# Patient Record
Sex: FEMALE | Race: WHITE | NOT HISPANIC OR LATINO | Employment: OTHER | URBAN - METROPOLITAN AREA
[De-identification: names, ages, dates, MRNs, and addresses within clinical notes are randomized per-mention and may not be internally consistent; named-entity substitution may affect disease eponyms.]

---

## 2019-10-18 ENCOUNTER — OFFICE VISIT - HEALTHEAST (OUTPATIENT)
Dept: CARDIOLOGY | Facility: CLINIC | Age: 79
End: 2019-10-18

## 2019-10-18 DIAGNOSIS — I10 BENIGN ESSENTIAL HYPERTENSION: ICD-10-CM

## 2019-10-18 RX ORDER — FERROUS SULFATE 325(65) MG
325 TABLET ORAL WEEKLY
Status: SHIPPED | COMMUNITY
Start: 2019-10-18 | End: 2021-08-17

## 2019-10-18 RX ORDER — ARIPIPRAZOLE 2 MG/1
2 TABLET ORAL DAILY
Refills: 1 | Status: SHIPPED | COMMUNITY
Start: 2019-09-15 | End: 2021-08-17

## 2019-10-18 RX ORDER — ROSUVASTATIN CALCIUM 5 MG/1
5 TABLET, COATED ORAL AT BEDTIME
Status: SHIPPED | COMMUNITY
Start: 2019-01-11

## 2019-10-18 RX ORDER — UBIDECARENONE 100 MG
100 CAPSULE ORAL DAILY
Status: SHIPPED | COMMUNITY
Start: 2019-10-18 | End: 2022-12-19

## 2019-10-18 RX ORDER — BUSPIRONE HYDROCHLORIDE 15 MG/1
15 TABLET ORAL 2 TIMES DAILY
Status: SHIPPED | COMMUNITY
Start: 2019-07-12 | End: 2022-12-19

## 2019-10-18 RX ORDER — LEVOTHYROXINE SODIUM 88 UG/1
88 TABLET ORAL DAILY
Status: SHIPPED | COMMUNITY
Start: 2019-07-14 | End: 2021-08-17

## 2019-10-18 RX ORDER — ACYCLOVIR 400 MG/1
400 TABLET ORAL DAILY PRN
Status: SHIPPED | COMMUNITY
Start: 2018-06-14 | End: 2021-09-30

## 2019-10-18 RX ORDER — HYDRALAZINE HYDROCHLORIDE 100 MG/1
50 TABLET, FILM COATED ORAL 3 TIMES DAILY
Status: SHIPPED | COMMUNITY
Start: 2019-10-18 | End: 2021-08-06 | Stop reason: DRUGHIGH

## 2019-10-18 RX ORDER — LOSARTAN POTASSIUM 100 MG/1
100 TABLET ORAL DAILY
Status: SHIPPED | COMMUNITY
Start: 2019-10-18 | End: 2021-08-17 | Stop reason: DRUGHIGH

## 2019-10-18 RX ORDER — BUPROPION HYDROCHLORIDE 100 MG/1
100 TABLET, EXTENDED RELEASE ORAL 2 TIMES DAILY
Status: SHIPPED | COMMUNITY
Start: 2019-08-24 | End: 2021-09-30

## 2019-10-18 ASSESSMENT — MIFFLIN-ST. JEOR: SCORE: 1194.36

## 2019-10-19 LAB
ATRIAL RATE - MUSE: 59 BPM
DIASTOLIC BLOOD PRESSURE - MUSE: NORMAL
INTERPRETATION ECG - MUSE: NORMAL
P AXIS - MUSE: 76 DEGREES
PR INTERVAL - MUSE: 184 MS
QRS DURATION - MUSE: 80 MS
QT - MUSE: 430 MS
QTC - MUSE: 425 MS
R AXIS - MUSE: -2 DEGREES
SYSTOLIC BLOOD PRESSURE - MUSE: NORMAL
T AXIS - MUSE: 38 DEGREES
VENTRICULAR RATE- MUSE: 59 BPM

## 2019-10-30 ENCOUNTER — COMMUNICATION - HEALTHEAST (OUTPATIENT)
Dept: CARDIOLOGY | Facility: CLINIC | Age: 79
End: 2019-10-30

## 2020-04-07 ENCOUNTER — AMBULATORY - HEALTHEAST (OUTPATIENT)
Dept: CARDIOLOGY | Facility: CLINIC | Age: 80
End: 2020-04-07

## 2020-04-08 ENCOUNTER — OFFICE VISIT - HEALTHEAST (OUTPATIENT)
Dept: CARDIOLOGY | Facility: CLINIC | Age: 80
End: 2020-04-08

## 2020-04-08 DIAGNOSIS — E78.5 DYSLIPIDEMIA, GOAL LDL BELOW 100: ICD-10-CM

## 2020-04-08 DIAGNOSIS — I10 BENIGN ESSENTIAL HYPERTENSION: ICD-10-CM

## 2020-04-08 DIAGNOSIS — I35.0 NONRHEUMATIC AORTIC VALVE STENOSIS: ICD-10-CM

## 2021-03-22 ENCOUNTER — COMMUNICATION - HEALTHEAST (OUTPATIENT)
Dept: CARDIOLOGY | Facility: CLINIC | Age: 81
End: 2021-03-22

## 2021-03-22 DIAGNOSIS — I10 BENIGN ESSENTIAL HYPERTENSION: ICD-10-CM

## 2021-03-26 ENCOUNTER — OFFICE VISIT - HEALTHEAST (OUTPATIENT)
Dept: CARDIOLOGY | Facility: CLINIC | Age: 81
End: 2021-03-26

## 2021-03-26 DIAGNOSIS — I35.0 NONRHEUMATIC AORTIC VALVE STENOSIS: ICD-10-CM

## 2021-03-26 DIAGNOSIS — I35.0 AORTIC STENOSIS: ICD-10-CM

## 2021-03-26 DIAGNOSIS — I10 BENIGN ESSENTIAL HYPERTENSION: ICD-10-CM

## 2021-03-26 RX ORDER — CLONIDINE HYDROCHLORIDE 0.1 MG/1
1 TABLET ORAL 2 TIMES DAILY
Status: SHIPPED | COMMUNITY
Start: 2021-02-15

## 2021-03-26 RX ORDER — DULOXETIN HYDROCHLORIDE 60 MG/1
120 CAPSULE, DELAYED RELEASE ORAL DAILY
Status: SHIPPED | COMMUNITY
Start: 2021-02-25 | End: 2022-12-19 | Stop reason: DRUGHIGH

## 2021-03-26 ASSESSMENT — MIFFLIN-ST. JEOR: SCORE: 1246.53

## 2021-03-29 LAB
ATRIAL RATE - MUSE: 87 BPM
DIASTOLIC BLOOD PRESSURE - MUSE: NORMAL
INTERPRETATION ECG - MUSE: NORMAL
P AXIS - MUSE: 75 DEGREES
PR INTERVAL - MUSE: 190 MS
QRS DURATION - MUSE: 76 MS
QT - MUSE: 382 MS
QTC - MUSE: 462 MS
R AXIS - MUSE: -7 DEGREES
SYSTOLIC BLOOD PRESSURE - MUSE: NORMAL
T AXIS - MUSE: 40 DEGREES
VENTRICULAR RATE- MUSE: 88 BPM

## 2021-04-01 ENCOUNTER — COMMUNICATION - HEALTHEAST (OUTPATIENT)
Dept: CARDIOLOGY | Facility: CLINIC | Age: 81
End: 2021-04-01

## 2021-04-01 DIAGNOSIS — I10 BENIGN ESSENTIAL HYPERTENSION: ICD-10-CM

## 2021-04-01 RX ORDER — HYDRALAZINE HYDROCHLORIDE 25 MG/1
50 TABLET, FILM COATED ORAL 3 TIMES DAILY
Qty: 540 TABLET | Refills: 2 | Status: SHIPPED | OUTPATIENT
Start: 2021-04-01 | End: 2021-08-06 | Stop reason: DRUGHIGH

## 2021-05-24 ENCOUNTER — RECORDS - HEALTHEAST (OUTPATIENT)
Dept: ADMINISTRATIVE | Facility: CLINIC | Age: 81
End: 2021-05-24

## 2021-05-25 ENCOUNTER — RECORDS - HEALTHEAST (OUTPATIENT)
Dept: ADMINISTRATIVE | Facility: CLINIC | Age: 81
End: 2021-05-25

## 2021-05-26 ENCOUNTER — RECORDS - HEALTHEAST (OUTPATIENT)
Dept: ADMINISTRATIVE | Facility: CLINIC | Age: 81
End: 2021-05-26

## 2021-05-28 ENCOUNTER — RECORDS - HEALTHEAST (OUTPATIENT)
Dept: ADMINISTRATIVE | Facility: CLINIC | Age: 81
End: 2021-05-28

## 2021-05-29 ENCOUNTER — RECORDS - HEALTHEAST (OUTPATIENT)
Dept: ADMINISTRATIVE | Facility: CLINIC | Age: 81
End: 2021-05-29

## 2021-05-30 ENCOUNTER — RECORDS - HEALTHEAST (OUTPATIENT)
Dept: ADMINISTRATIVE | Facility: CLINIC | Age: 81
End: 2021-05-30

## 2021-05-31 ENCOUNTER — RECORDS - HEALTHEAST (OUTPATIENT)
Dept: ADMINISTRATIVE | Facility: CLINIC | Age: 81
End: 2021-05-31

## 2021-06-02 NOTE — PATIENT INSTRUCTIONS - HE
Please increase the hydralazine to 75mg in the morning, 50mg in the afternoon and 50mg at bedtime.Please monitor your blood pressure and any symptoms of dizziness and call my nurse Kristy with blood pressure readings over the next few weeks,Her number is [phone]

## 2021-06-02 NOTE — TELEPHONE ENCOUNTER
Pt fell and broke her wrist, is scheduled for surgery 11/1/19.      Friend wants to know if pt should keep 11/8/19 appt with Dr Pena as pt missed her 10/28/19 stress echo appt.    Dr Pena - do you want stress echo prior to your visit with her?  We would have to reschedule your appt then until she is able to do the stress echo.  -sammi

## 2021-06-02 NOTE — TELEPHONE ENCOUNTER
----- Message from Alison Vaughan sent at 10/30/2019  2:20 PM CDT -----  Regarding: MDNYA  Caller: Beryl Castillo Rajesh, friend    Primary cardiologist: Dr. Pena    Detailed reason for call: Beryl Castillo states Jodee fractured her wrist 10/28/19 and she is having surgery at [facility] on 11/1/19. Please call back.     Best phone number: [phone]      Best time to contact: Any    Ok to leave a detailed message? Yes    Device? No

## 2021-06-03 VITALS
WEIGHT: 170 LBS | DIASTOLIC BLOOD PRESSURE: 66 MMHG | HEIGHT: 62 IN | SYSTOLIC BLOOD PRESSURE: 120 MMHG | RESPIRATION RATE: 16 BRPM | BODY MASS INDEX: 31.28 KG/M2 | HEART RATE: 64 BPM

## 2021-06-04 VITALS — WEIGHT: 168 LBS | BODY MASS INDEX: 30.73 KG/M2

## 2021-06-05 VITALS
HEART RATE: 60 BPM | HEIGHT: 63 IN | WEIGHT: 178 LBS | RESPIRATION RATE: 16 BRPM | BODY MASS INDEX: 31.54 KG/M2 | DIASTOLIC BLOOD PRESSURE: 76 MMHG | SYSTOLIC BLOOD PRESSURE: 126 MMHG

## 2021-06-16 PROBLEM — I35.0 AORTIC STENOSIS: Status: ACTIVE | Noted: 2021-03-26

## 2021-06-16 NOTE — PATIENT INSTRUCTIONS - HE
Please call my nurse Kristy with any heart related questions.Her number is [phone], We are going to plan a heart ultrasound to follow up on the aortic valve.We decided to visit in a year and repeat the heart ultrasound at that time.Please call with increased shortness of breath.

## 2021-06-28 NOTE — PROGRESS NOTES
"Progress Notes by Audie Pena MD at 4/8/2020  9:30 AM     Author: Audie Pena MD Service: -- Author Type: Physician    Filed: 4/8/2020  9:58 AM Encounter Date: 4/8/2020 Status: Signed    : Audie Pena MD (Physician)           The patient has been notified of following:     \"This telephone visit will be conducted via a call between you and your physician/provider. We have found that certain health care needs can be provided without the need for a physical exam.  This service lets us provide the care you need with a phone conversation.  If a prescription is necessary we can send it directly to your pharmacy.  If lab work is needed we can place an order for that and you can then stop by our lab to have the test done at a later time. If during the course of the call the physician/provider feels a telephone visit is not appropriate, you will not be charged for this service.\" Verbal consent has been obtained for this service by care team member:         HEART CARE PHONE ENCOUNTER        The patient has chosen to have the visit conducted as a telephone visit, to reduce risk of exposure given the current status of Coronavirus in our community. This telephone visit is being conducted via a call between the patient and physician/provider. Health care needs are being provided without a physical exam.     Assessment/Recommendations   Assessment:    1.  Hypertension.  She does tell me that at times her blood pressures are running mildly elevated in the 1 40-1 50 systolic range.  I did ask her to monitor her blood pressure over the next few weeks and report her blood pressure back to my nurse.  We talked about checking her blood pressure different times of the day and checking her blood pressure after she has been seated for a good 10 minutes.    2.  Very mild calcific aortic stenosis based on echocardiogram from [facility].  Periodic follow-up is planned.  Mean gradient was reported to be 10 " mmHg.    3.  Dyslipidemia.  Lipids are reviewed from November 2019 and outlined below.  She is on 5 mg of rosuvastatin.  Plan:  1.  She will update us with some blood pressure readings.  Follow-up in 6 months or sooner if specific issues were to arise.      Follow Up Plan: Follow up in 6 months  I have reviewed the note as documented.  This accurately captures the substance of my conversation with the patient.    Total time of call between patient and provider was 15 minutes minutes   Start Time: 9:34 AM  Stop Time: 9:49 AM       History of Present Illness/Subjective    Jodee Johnson is a 79 y.o. female who is being evaluated via a billable telephone visit.  I have reviewed notes from her primary care provider at [facility] including blood pressure results.  She provides me with some blood pressure results as well.  She tells me that overall she is been feeling well.  She has had no chest pain, dizziness, shortness of breath, palpitations.  We last visited October 2019 with reported significant elevation in blood pressure and she wanted my input and recommendations.  At that time we increased her hydralazine to 75 mg 3 times daily.  She reports that blood pressures have typically been at home in the 1 30-1 50 systolic range.  I have reviewed 2 blood pressures from her primary care provider that demonstrate ultimately 130/70 range.  The most recent blood pressure recording is from March 2021 her blood pressure was 136/66 by report.  She tells me she is following a low-salt diet.  We talked about resting for 10 to 15 minutes before checking her blood pressure and then providing me with some blood pressure readings over the next few weeks.  I reviewed her blood pressure doses of medications including all her medications.  Review of systems is reviewed and negative.  He tells me that she has now recovered fairly well from a wrist fracture.  We had planned an exercise stress echocardiogram when she saw me in October  but then she postponed this study because of the wrist fracture.  She also has been dealing with issues related to depression and also tells me that this is improved.    Lab results from 2019 at her primary care provider's office are reviewed.  Sodium 143, potassium 4.3, chloride 107, CO2 25, BUN 15, creatinine 0.78 ferritin 94.7, hemoglobin A1c 5.4 CBC within normal limits, cholesterol 151, triglycerides 103, HDL 60, LDL 70.    ECHO COMPLETE W CONTRAST2019  [facility]  Component Name Value Ref Range   EJECTION FRACTION 65-70%     Other Result Information   This result has an attachment that is not available.   Result Narrative        [facility]   [address], [address], [place] [address]   Main: [phone]  [email or web address]                                                 Transthoracic Echo Report   TOBY SY   Fabricewill ID: [id number] Age: 79 : 1940 Ordering Provider: DENYS RODRIGUEZ   Exam Date: 2019 10:21 Gender: F [place] [facility] [place]   Accession #: [id number] Height: 63 in BSA: 1.79 m  BP: 118 / 64   Weight: 166 lbs BMI: 29.4 kg/m  HR: 51         Site: [facility]   Location: Inpatient (Portable) Rhythm: Sinus Bradycardia   Procedure Components: 2D imaging with contrast, Color Doppler, Spectral Doppler   Indications: Chest pain chest pressure chest tightening   Technical Quality: Good Contrast: Definity   Previous Study: 2019   Final Conclusion   Normal LV size and systolic function with estimated ejection fraction of 65-70%.   No obvious regional LV wall motion abnormalities.   The right ventricle is normal in size and function.   Very mild calcific aortic stenosis (Vmax 2.2 m/s, mean gradient 10 mmHg, MARILU .1.5 cm2).   Estimated pulmonary artery pressure of 28 mmHg + RA pressure.   When compared to the previous echocardiographic report of 2019, there has been no   significant  change.     Result Narrative     Clinical History: Depression    Procedure: XR CHEST 2 VIEWS PA AND LATERAL    Comparison: Chest 5/8/2015    Findings: Cardiac and mediastinal silhouettes unremarkable. No pleural effusion, pneumothorax or focal consolidation. There is multilevel thoracic spondylosis.    Impression: Unremarkable chest examination.           I have reviewed and updated the patient's Past Medical History, Social History, Family History and Medication List.     Physical Examination not performed given phone encounter Review of Systems                                                Medical History  Surgical History Family History Social History   No past medical history on file. No past surgical history on file. No family history on file. Social History     Socioeconomic History   ? Marital status: Single     Spouse name: Not on file   ? Number of children: Not on file   ? Years of education: Not on file   ? Highest education level: Not on file   Occupational History   ? Not on file   Social Needs   ? Financial resource strain: Not on file   ? Food insecurity     Worry: Not on file     Inability: Not on file   ? Transportation needs     Medical: Not on file     Non-medical: Not on file   Tobacco Use   ? Smoking status: Former Smoker     Packs/day: 0.00   ? Smokeless tobacco: Never Used   Substance and Sexual Activity   ? Alcohol use: Not on file   ? Drug use: Not on file   ? Sexual activity: Not on file   Lifestyle   ? Physical activity     Days per week: Not on file     Minutes per session: Not on file   ? Stress: Not on file   Relationships   ? Social connections     Talks on phone: Not on file     Gets together: Not on file     Attends Bahai service: Not on file     Active member of club or organization: Not on file     Attends meetings of clubs or organizations: Not on file     Relationship status: Not on file   ? Intimate partner violence     Fear of current or ex partner: Not on file      Emotionally abused: Not on file     Physically abused: Not on file     Forced sexual activity: Not on file   Other Topics Concern   ? Not on file   Social History Narrative   ? Not on file          Medications  Allergies   Current Outpatient Medications   Medication Sig Dispense Refill   ? acyclovir (ZOVIRAX) 400 MG tablet Take 400 mg by mouth daily as needed.     ? ARIPiprazole (ABILIFY) 2 MG tablet Take 2 mg by mouth daily.  1   ? buPROPion (WELLBUTRIN SR) 100 MG 12 hr tablet Take 100 mg by mouth 2 (two) times a day.     ? busPIRone (BUSPAR) 15 MG tablet Take 7.5 mg by mouth 2 (two) times a day.     ? coenzyme Q10 100 mg capsule Take 100 mg by mouth daily.     ? conjugated estrogens (PREMARIN) vaginal cream Insert 1 Applicatorful into the vagina daily as needed.     ? escitalopram oxalate (LEXAPRO) 10 MG tablet Take 10 mg by mouth daily.     ? ferrous sulfate 325 (65 FE) MG tablet Take 325 mg by mouth once a week.     ? hydrALAZINE (APRESOLINE) 100 MG tablet Take 50 mg by mouth 3 (three) times a day.     ? hydrALAZINE (APRESOLINE) 25 MG tablet Take 1 tablet each morning together with the 50mg tablet for 75mg total or as directed 90 tablet 5   ? levothyroxine (SYNTHROID, LEVOTHROID) 88 MCG tablet Take 88 mcg by mouth daily.     ? losartan (COZAAR) 100 MG tablet Take 100 mg by mouth daily.     ? magnesium 250 mg Tab Take 500 mg by mouth daily.     ? melatonin 5 mg cap Take 10 mg by mouth at bedtime.     ? rOPINIRole (REQUIP) 0.5 MG tablet Take 1.5 mg by mouth at bedtime as needed.     ? rosuvastatin (CRESTOR) 5 MG tablet Take 5 mg by mouth every other day.     ? traMADol (ULTRAM) 50 mg tablet Take 50 mg by mouth daily as needed.       No current facility-administered medications for this visit.     Allergies   Allergen Reactions   ? Atenolol      Other reaction(s): Bradycardia  Other reaction(s): Bradycardia     ? Hydrochlorothiazide      Other reaction(s): Hypercalcemia  Other reaction(s): Hypercalcemia     ?  Gabapentin Other (See Comments)     Other reaction(s): Confusion  Other reaction(s): Insomnia, Unknown  insomnia  insomnia     ? Lisinopril Nausea Only     Other reaction(s): GI Upset  Other reaction(s): Abdominal Pain     ? Nortriptyline Unknown   ? Oxycodone Itching   ? Paroxetine      Other reaction(s): Drowsiness, Sedation  Other reaction(s): Sedation     ? Tetracycline Photosensitivity and Rash   ? Amlodipine      Other reaction(s): GI Upset, Vomiting  Other reaction(s): Abdominal Pain     ? Codeine Itching     Tolerates tylenol 3 at home per pt     ? Oxycodone-Acetaminophen Itching   ? Sertraline      Other reaction(s): Sedation  Other reaction(s): Sedation     ? Diphenhydramine Anxiety     Other reaction(s): Agitation  Other reaction(s): Unknown           Lab Results    Chemistry/lipid CBC Cardiac Enzymes/BNP/TSH/INR   No results found for: CHOL, HDL, LDLCALC, TRIG, CREATININE, BUN, K, NA, CL, CO2 No results found for: WBC, HGB, HCT, MCV, PLT No results found for: CKTOTAL, CKMB, CKMBINDEX, TROPONINI, BNP, TSH, INR   18-OCT-2019 15:06:39 HE JOES/JOHNS/TERRI/RAQUEL  Sinus bradycardia with Premature atrial complexes  Nonspecific ST and T wave abnormality  Abnormal ECG  When compared with ECG of 20-MAY-1999 15:40,  Premature atrial complexes are now Present  ST now depressed in Anterior leads  Nonspecific T wave abnormality now evident in Anterolateral leads  Confirmed by BINA PATE MD LOC: ([id number]) on 10/19/2019 5:12:28 PM  25mm/s 10mm/mV 150Hz 8.0 SP2 12SL 239 CATRACHITO: 2  Referred by: Jluis Reese / MD: BINA LOC: HERLINDA Pena

## 2021-06-28 NOTE — PROGRESS NOTES
Progress Notes by Audie Pena MD at 10/18/2019  2:10 PM     Author: Audie Pena MD Service: -- Author Type: Physician    Filed: 10/18/2019  3:25 PM Encounter Date: 10/18/2019 Status: Signed    : Audie Pena MD (Physician)             [facility] Progress Note    Assessment:  1.  Hypertension.  Long-standing hypertension.  She had admission to the hospital August 2019 for elevated blood pressure.  Her blood pressure some better but still suboptimal.  She reports that typically her blood pressures are in the 140 systolic range.  She is going to increase the hydralazine to 75 mg in the morning and continue with 50 mg in the afternoon and bedtime and monitor and report her blood pressures.  I am going to ask that she undergo exercise stress echocardiogram secondary to the elevated troponin that was identified a few months ago and likely related to the hypertension.  She had a negative nuclear stress test in 2014.    2.  Very mild calcific aortic stenosis based on echocardiogram from a few months ago.  Mean gradient of 10 mmHg.    3.  Risk factor modification.  Less ~179 LDL 89 HDL 67 a few months ago at her primary care physician's office.  TSH was noted to be mildly low July 2019 and she is encouraged to follow-up with her primary care physician in this regard.      Plan:  Increase hydralazine to 75/50/50.            Stress echocardiogram and monitor blood pressure.            Follow-up 1 month.    1. Benign essential hypertension  ECG Clinic - Today    Echo Stress Exercise    hydrALAZINE (APRESOLINE) 25 MG tablet         An After Visit Summary was printed and given to the patient.    Subjective:    Jodee Johnson is a 79 y.o. female who returned for a planned  follow up visit.  I have not seen her since 2014.  She has a history of hypertension and restless leg syndrome and had been having difficulties with dyspnea when we visited May 2014.  She had an equivocal stress  echocardiogram that was described as technically difficult at that time.  We subsequently to the stress nuclear study which was negative for ischemia.  She has not returned in the interim.  I reviewed recent chart notes from hospitalization August 2019 where she was admitted for marked elevation in blood pressure.  Her blood pressure was 235/77.  She reports that the number of years ago she had a history of hemorrhagic stroke related to suboptimal blood pressure control.  She has been taking her medications regularly.  Her hydralazine was subsequently adjusted in the hospital to 50 mg 3 times daily from 50 mg twice daily.  She reports that her blood pressures have been better and typically running in the 140/70 range but does not run much lower.    She has been walking regularly.  She does not describe significant chest discomfort or significant shortness of breath.  She does describe with insomnia.  She had a mild elevation in troponin during that hospitalization in August which apparently was felt to be related to the elevated blood pressure.  Had an echocardiogram during that hospitalization that demonstrated normal systolic function.  I have reviewed the laboratory studies, chart notes and echocardiogram results.    She does have a history of depression and has been in the hospital in September 2019.      Past Medical History:   . Date   ? Acne teen   ? Balance disorder 2006   ? Cataract 2010   ? Depression 1973   ? Dysthymia   ? Fe deficiency anemia   ? Fecal incontinence 5/18/2011   wears pad all time, soft formed stools ;citrucel not help.   ? Hair loss 2015   ? Hiatal hernia   ? HTN (hypertension)   ? Hyperparathyroidism 12-06   sestamibi neg/nl Ca but PTH 94 5/10   ? Hypothyroid   resolved never really high TSH   ? Obesity   ? Oral herpes simplex infection   ? Osteopenia of the elderly   fracturs   ? Post-surgical hypothyroidism   ? Psoriasis 2007   ? Psoriasis   ? Recurrent UTI   ? RLS (restless legs  syndrome)   ? Sciatica of right side       Past Surgical History:   . Laterality Date   ? COLONOSCOPY SCREENING 9-   ? COMPLETION LEFT THYROID LOBECTOMY 10-5-12   [facility]   ? ESOPHAGOGASTRODUODENOSCOPY 9-   ? pain agree   ? PARTIAL THYROIDECTOMY years ago [facility]   ? thyroid nodule 40s   biopsy - benign   ? tib fib fract repair   Tib, fib, and ankle fx with nola and pin placement   ? TUBAL LIGATION     Allergies:   Allergies   Allergen Reactions   ? Tetracycline Rash   ? Amlodipine GI Upset   ? Atenolol Bradycardia   ? Codeine Itching   ? Diphenhydramine Agitation   ? Gabapentin *Unknown - Pt Doesn't Remember   insomnia   ? Hydrochlorothiazide Hypercalcemia   ? Lisinopril GI Upset       ? Paroxetine Sedation       ? Percocet [Oxycodone-Acetaminophen] Itching   ? Sertraline Sedation     Family History   Problem Relation Age of Onset   ? Heart Disease Mother   MI (Tob) at 70s/CAB in 80s   ? Hypertension Mother   ? Other Mother   DJD   ? Osteoporosis Mother   hip fracture   ? Arthritis Mother   rheumatoid   ? Hypertension Father   ? Heart Disease Father   VALVE DISEASE, CHF, RHEMATIC HEART   ? Other Father   Snoring   ? Stroke Paternal Grandmother   ? Stroke Paternal Aunt   ? Cancer-breast Maternal Aunt   dx 80's   ? Cancer-breast Sister   61yo   ? Diabetes Sister   ? Thyroid Disease Sister   hypo   ? Thyroid Disease Sister   hypo   ? Diabetes Paternal Uncle   ? Diabetes Maternal Uncle   ? Other Sister   colon polyps at 58   ? Other Daughter   RLS   ? Cancer-ovarian No Family History   ? Other Unknown   dementia        Review of Systems:   General: WNL  Eyes: WNL  Ears/Nose/Throat: Hearing Loss  Lungs: WNL  Heart: Irregular Heartbeat, Leg Swelling  Stomach: WNL  Bladder: WNL  Muscle/Joints: Joint Pain  Skin: WNL  Nervous System: WNL  Mental Health: Depression     Blood: WNL      Problem List:    There is no problem list on file for this patient.      Social History     Socioeconomic History   ?  Marital status: Single     Spouse name: Not on file   ? Number of children: Not on file   ? Years of education: Not on file   ? Highest education level: Not on file   Occupational History   ? Not on file   Social Needs   ? Financial resource strain: Not on file   ? Food insecurity:     Worry: Not on file     Inability: Not on file   ? Transportation needs:     Medical: Not on file     Non-medical: Not on file   Tobacco Use   ? Smoking status: Former Smoker     Packs/day: 0.00   ? Smokeless tobacco: Never Used   Substance and Sexual Activity   ? Alcohol use: Not on file   ? Drug use: Not on file   ? Sexual activity: Not on file   Lifestyle   ? Physical activity:     Days per week: Not on file     Minutes per session: Not on file   ? Stress: Not on file   Relationships   ? Social connections:     Talks on phone: Not on file     Gets together: Not on file     Attends [employer] service: Not on file     Active member of club or organization: Not on file     Attends meetings of clubs or organizations: Not on file     Relationship status: Not on file   ? Intimate partner violence:     Fear of current or ex partner: Not on file     Emotionally abused: Not on file     Physically abused: Not on file     Forced sexual activity: Not on file   Other Topics Concern   ? Not on file   Social History Narrative   ? Not on file       No family history on file.    Current Outpatient Medications   Medication Sig Dispense Refill   ? acyclovir (ZOVIRAX) 400 MG tablet Take 400 mg by mouth daily as needed.     ? ARIPiprazole (ABILIFY) 2 MG tablet Take 2 mg by mouth daily.  1   ? buPROPion (WELLBUTRIN SR) 100 MG 12 hr tablet Take 100 mg by mouth 2 (two) times a day.     ? busPIRone (BUSPAR) 15 MG tablet Take 7.5 mg by mouth 2 (two) times a day.     ? coenzyme Q10 100 mg capsule Take 100 mg by mouth daily.     ? conjugated estrogens (PREMARIN) vaginal cream Insert 1 Applicatorful into the vagina daily as needed.     ? escitalopram oxalate  "(LEXAPRO) 10 MG tablet Take 10 mg by mouth daily.     ? ferrous sulfate 325 (65 FE) MG tablet Take 325 mg by mouth once a week.     ? furosemide (LASIX) 20 MG tablet Take 20 mg by mouth daily.     ? hydrALAZINE (APRESOLINE) 100 MG tablet Take 50 mg by mouth 3 (three) times a day.     ? levothyroxine (SYNTHROID, LEVOTHROID) 88 MCG tablet Take 88 mcg by mouth daily.     ? losartan (COZAAR) 100 MG tablet Take 100 mg by mouth daily.     ? magnesium 250 mg Tab Take 500 mg by mouth daily.     ? melatonin 5 mg cap Take 10 mg by mouth at bedtime.     ? potassium chloride (K-DUR,KLOR-CON) 10 MEQ tablet Take 10 mEq by mouth daily.     ? rOPINIRole (REQUIP) 0.5 MG tablet Take 1.5 mg by mouth at bedtime as needed.     ? rosuvastatin (CRESTOR) 5 MG tablet Take 5 mg by mouth every other day.     ? traMADol (ULTRAM) 50 mg tablet Take 50 mg by mouth daily as needed.     ? hydrALAZINE (APRESOLINE) 25 MG tablet Take 1 tablet each morning together with the 50mg tablet for 75mg total or as directed 90 tablet 5     No current facility-administered medications for this visit.        Objective:     /66 (Patient Site: Right Arm, Patient Position: Sitting, Cuff Size: Adult Regular)   Pulse 64   Resp 16   Ht 5' 2\" (1.575 m)   Wt 170 lb (77.1 kg)   BMI 31.09 kg/m    170 lb (77.1 kg)   [unfilled]  Wt Readings from Last 3 Encounters:   10/18/19 170 lb (77.1 kg)   130/68 right arm sitting.    Physical Exam:    GENERAL APPEARANCE: alert, no apparent distress  HEENT: no scleral icterus or xanthelasma  NECK: jugular venous pressure normal limits  CHEST: symmetric, the lungs are clear to auscultation  CARDIOVASCULAR: regular rhythm with systolic murmur, S4; no carotid bruits  Abdomen: No Organomegaly, masses, bruits, or tenderness. Bowels sounds are present      EXTREMITIES: no cyanosis, clubbing or edema    Cardiac Testing:  Site: [facility]   Location: Inpatient (Portable) Rhythm: Sinus Bradycardia   Procedure Components: 2D " imaging with contrast, Color Doppler, Spectral Doppler   Indications: Chest pain chest pressure chest tightening   Technical Quality: Good Contrast: Definity   Previous Study: 1/11/2019   Final Conclusion   Normal LV size and systolic function with estimated ejection fraction of 65-70%.   No obvious regional LV wall motion abnormalities.   The right ventricle is normal in size and function.   Very mild calcific aortic stenosis (Vmax 2.2 m/s, mean gradient 10 mmHg, MARILU .1.5 cm2).   Estimated pulmonary artery pressure of 28 mmHg + RA pressure.   When compared to the previous echocardiographic report of 1/11/2019, there has been no   significant change.     Estimated EF: 65-70%   FINDINGS   Left Ventricle Normal left ventricular size. Hyperdynamic left ventricular systolic function   with an estimated ejection fraction of   65-70%.  No obvious regional wall motion abnormalities.  Mild left ventricular hypertrophy.   Diastolic Function Indeterminate diastolic filling pattern.   Right Ventricle The right ventricle is normal in size and function.   Left Atrium Left atrial size at the upper limits of normal.   Right Atrium Right atrial size at the upper limits of normal.   Aortic Valve Very mild calcific aortic stenosis (Vmax 2.2 m/s, mean gradient 10 mmHg, MARILU .1.5   cm2).  Trace aortic   regurgitation.   Mitral Valve Mild mitral annular calcification.  No mitral stenosis. Trace mitral   regurgitation.   Tricuspid Valve Structurally normal tricuspid valve without significant stenosis. Mild   tricuspid regurgitation. Estimated   pulmonary artery pressure of 28 mmHg + RA pressure.   Pulmonic Valve Limited view of the pulmonic valve without significant stenosis. Trace pulmonic   regurgitation.   Pericardium Normal pericardium without effusion.   Aorta The sinuses of Valsalva, sinotubular junction, and ascending aorta appear normal.   Inferior Vena Cava IVC not well visualized.   Other None.  ECG demonstrates sinus rhythm  with occasional premature atrial complex nonspecific ST-T changes  Lab Results:    No results found for: NA, K, CL, CO2, BUN, CREATININE, GLUCOSE, CALCIUM  No results found for: CHOL, TRIG, HDL, LDLDIRECT  No results found for: BNP  No results found for: CREATININE  No results found for: LDLCALC  No results found for: WBC, HGB, HCT, MCV, PLT      This note has been dictated using voice recognition software. Any grammatical or context distortions are unintentional and inherent to the software.

## 2021-06-30 NOTE — PROGRESS NOTES
Progress Notes by Audie Pena MD at 3/26/2021  2:10 PM     Author: Audie Pena MD Service: -- Author Type: Physician    Filed: 3/26/2021  3:10 PM Encounter Date: 3/26/2021 Status: Signed    : Audie Pena MD (Physician)             [facility] Progress Note    Assessment:  1.  Hypertension.  Blood pressure appears to be under good control.  Today's blood pressure 126/76.  I reviewed her current combination of medications which she reports that she is tolerating.  I asked her to continue to monitor her blood pressure and certainly call with any questions or concerns.    2.  Mild aortic stenosis.  This was reported very mild on echocardiography August 2019.  The report indicates normal systolic function, mean gradient across aortic valve of 10 mmHg.  She is not experiencing any symptoms related to the aortic valve and the exam is unchanged.  We discussed follow-up echocardiography and sided that we would rediscuss in 1 year unless she develops any particular symptoms.    3.  Dyslipidemia on low-dose rosuvastatin.  In February from her outside clinic cholesterol was 136, LDL 56.  White count 6.8 hemoglobin 13.6 hematocrit 41.4 platelets 374, mild elevation in calcium which is being monitored by her primary care provider, sodium 142, potassium 3.7, chloride 107.    4.  Irregular rhythm.  EKG today demonstrates sinus rhythm with premature atrial complexes and nonspecific ST-T changes.  Nonspecific ST-T changes are similar to October 2019.  We discussed potentially being at higher risk for element of atrial fibrillation and I asked her to monitor for irregular heart rhythm or palpitations and to update us.      Plan: As outlined above with plan follow-up in 1 year.    1. Aortic stenosis  ECG Clinic - Today    CANCELED: Echo Complete   2. Benign essential hypertension     3. Nonrheumatic aortic valve stenosis           An After Visit Summary was printed and given to the  patient.    Subjective:    Jodee Johnson is a 80 y.o. female who returned for a planned  follow up visit.  I have reviewed notes from her primary care physician including laboratory studies.  We visited by telephone April 2020.  She originally came to see me in 2019 because of some blood pressure issues and her blood pressure per her report is been under good control.  In addition she did have an echocardiogram through the outside clinic in 2019 that did reveal mild aortic stenosis.  He tells me that blood pressures typically have been between 120 and 140/60-80 diastolic.  She denies chest pain, shortness of breath, dizziness or lightheadedness.  She does deal with issues related to depression and is followed closely per review of chart record.    Review of Systems:   General: WNL  Eyes: WNL  Ears/Nose/Throat: Hearing Loss  Lungs: WNL  Heart: WNL  Stomach: WNL  Bladder: WNL  Muscle/Joints: WNL  Skin: WNL  Nervous System: WNL  Mental Health: Depression     Blood: WNL      Problem List:    Patient Active Problem List   Diagnosis   ? Benign essential hypertension   ? Aortic stenosis       Social History     Socioeconomic History   ? Marital status: Single     Spouse name: Not on file   ? Number of children: Not on file   ? Years of education: Not on file   ? Highest education level: Not on file   Occupational History   ? Not on file   Social Needs   ? Financial resource strain: Not on file   ? Food insecurity     Worry: Not on file     Inability: Not on file   ? Transportation needs     Medical: Not on file     Non-medical: Not on file   Tobacco Use   ? Smoking status: Former Smoker     Packs/day: 0.00   ? Smokeless tobacco: Never Used   Substance and Sexual Activity   ? Alcohol use: Not on file   ? Drug use: Not on file   ? Sexual activity: Not on file   Lifestyle   ? Physical activity     Days per week: Not on file     Minutes per session: Not on file   ? Stress: Not on file   Relationships   ? Social connections      Talks on phone: Not on file     Gets together: Not on file     Attends [employer] service: Not on file     Active member of club or organization: Not on file     Attends meetings of clubs or organizations: Not on file     Relationship status: Not on file   ? Intimate partner violence     Fear of current or ex partner: Not on file     Emotionally abused: Not on file     Physically abused: Not on file     Forced sexual activity: Not on file   Other Topics Concern   ? Not on file   Social History Narrative   ? Not on file       No family history on file.    Current Outpatient Medications   Medication Sig Dispense Refill   ? acyclovir (ZOVIRAX) 400 MG tablet Take 400 mg by mouth daily as needed.     ? ARIPiprazole (ABILIFY) 2 MG tablet Take 2 mg by mouth daily.  1   ? buPROPion (WELLBUTRIN SR) 100 MG 12 hr tablet Take 100 mg by mouth 2 (two) times a day.     ? busPIRone (BUSPAR) 15 MG tablet Take 7.5 mg by mouth 2 (two) times a day.     ? cholecalciferol, vitamin D3, 50 mcg (2,000 unit) capsule Take 1 capsule by mouth daily.     ? cloNIDine HCL (CATAPRES) 0.1 MG tablet TAKE 1 TABLET BY MOUTH TWICE DAILY. GENERIC EQUIVALENT FOR CATAPRES     ? coenzyme Q10 100 mg capsule Take 100 mg by mouth daily.     ? cyanocobalamin 1000 MCG tablet Take 1,000 mcg by mouth daily.     ? DULoxetine (CYMBALTA) 60 MG capsule Take 120 mg by mouth daily.     ? ferrous sulfate 325 (65 FE) MG tablet Take 325 mg by mouth once a week.     ? hydrALAZINE (APRESOLINE) 100 MG tablet Take 50 mg by mouth 3 (three) times a day.     ? hydrALAZINE (APRESOLINE) 25 MG tablet Take 2 tablets (50 mg total) by mouth 3 (three) times a day. 270 tablet 0   ? levothyroxine (SYNTHROID, LEVOTHROID) 88 MCG tablet Take 88 mcg by mouth daily.     ? losartan (COZAAR) 100 MG tablet Take 100 mg by mouth daily.     ? rosuvastatin (CRESTOR) 5 MG tablet Take 5 mg by mouth every other day.       No current facility-administered medications for this visit.        Objective:  "    /76 (Patient Site: Left Arm, Patient Position: Sitting, Cuff Size: Adult Regular)   Pulse 60   Resp 16   Ht 5' 3\" (1.6 m)   Wt 178 lb (80.7 kg)   BMI 31.53 kg/m    178 lb (80.7 kg)   [unfilled]  Wt Readings from Last 3 Encounters:   21 178 lb (80.7 kg)   20 168 lb (76.2 kg)   10/18/19 170 lb (77.1 kg)       Physical Exam:    GENERAL APPEARANCE: alert, no apparent distress  HEENT: no scleral icterus or xanthelasma  NECK: jugular venous pressure within normal limits  CHEST: symmetric, the lungs are clear to auscultation  CARDIOVASCULAR: regular rhythm with soft systolic murmur early peaking, click, or gallop; no carotid bruits  Abdomen: No Organomegaly, masses, bruits, or tenderness. Bowels sounds are present      EXTREMITIES: no cyanosis, clubbing or edema    Cardiac Testing:  Result Narrative   EXAM: XR CHEST 2 VIEWS PA AND LATERAL  LOCATION: [facility]  DATE/TIME: 2019 3:40 PM    INDICATION: Cough.  COMPARISON: 2019.    IMPRESSION: Heart size and pulmonary vascularity within normal limits. Left  basilar atelectasis. Hyperinflation both lungs with probable emphysematous  exchange. Hypertrophic changes thoracic spine. Anterior wedging of a lower  thoracic vertebral body stable. Aortic calcification.   Other Result Information   Interface, [employer] - 2019  5:33 PM CST  EXAM: XR CHEST 2 VIEWS PA AND LATERAL  LOCATION: [facility]  DATE/TIME: 2019 3:40 PM    INDICATION: Cough.  COMPARISON: 2019.    IMPRESSION: Heart size and pulmonary vascularity within normal limits. Left  basilar atelectasis. Hyperinflation both lungs with probable emphysematous  exchange. Hypertrophic changes thoracic spine. Anterior wedging of a lower  thoracic vertebral body stable. Aortic calcification     Excellian ID: [id number] Age: 79 : 1940 Ordering Provider: DENYS RODRIGUEZ   Exam Date: 2019 10:21 Gender: F Sonographer: [facility]   Accession #: [id number] Height: " 63 in BSA: 1.79 m  BP: 118 / 64   Weight: 166 lbs BMI: 29.4 kg/m  HR: 51         Site: [facility]   Location: Inpatient (Portable) Rhythm: Sinus Bradycardia   Procedure Components: 2D imaging with contrast, Color Doppler, Spectral Doppler   Indications: Chest pain chest pressure chest tightening   Technical Quality: Good Contrast: Definity   Previous Study: 1/11/2019   Final Conclusion   Normal LV size and systolic function with estimated ejection fraction of 65-70%.   No obvious regional LV wall motion abnormalities.   The right ventricle is normal in size and function.   Very mild calcific aortic stenosis (Vmax 2.2 m/s, mean gradient 10 mmHg, MARILU .1.5 cm2).   Estimated pulmonary artery pressure of 28 mmHg + RA pressure.   When compared to the previous echocardiographic report of 1/11/2019, there has been no   significant change.     Estimated EF: 65-70    Lab Results:  ECG today demonstrates sinus rhythm with premature atrial complexes, nonspecific ST-T changes.  Compared to October 2019 findings are similar although more frequent premature atrial complexes are noted.    This note has been dictated using voice recognition software. Any grammatical or context distortions are unintentional and inherent to the software.

## 2021-07-13 ENCOUNTER — RECORDS - HEALTHEAST (OUTPATIENT)
Dept: ADMINISTRATIVE | Facility: CLINIC | Age: 81
End: 2021-07-13

## 2021-07-21 ENCOUNTER — RECORDS - HEALTHEAST (OUTPATIENT)
Dept: ADMINISTRATIVE | Facility: CLINIC | Age: 81
End: 2021-07-21

## 2021-08-04 ENCOUNTER — TELEPHONE (OUTPATIENT)
Dept: CARDIOLOGY | Facility: CLINIC | Age: 81
End: 2021-08-04

## 2021-08-04 DIAGNOSIS — I10 BENIGN ESSENTIAL HYPERTENSION: Primary | ICD-10-CM

## 2021-08-04 DIAGNOSIS — I35.0 AORTIC STENOSIS: ICD-10-CM

## 2021-08-05 NOTE — TELEPHONE ENCOUNTER
How long has she been taking the 100mg tid. Is she tolerating and how has bp been ? She is overdue for follow up. If tolerating and bp ok would continue as is. Thanks.

## 2021-08-05 NOTE — TELEPHONE ENCOUNTER
Need clarification on hydralazine dose.    Spouse states pt is taking a 50 mg  PLUS two 25 mg tablets for 100 mg three times daily.    Prescriptions have been sent for both 50 mg tablets and 25 mg tablets - both instructions stated to take 50 mg three times daily.    4/8/20 OV:  We last visited October 2019 with reported significant elevation in blood pressure and she wanted my input and recommendations. At that time we increased her hydralazine to 75 mg 3 times daily. She reports that blood pressures have typically been at home in the 1 30-1 50 systolic range. I have reviewed 2 blood pressures from her primary care provider that demonstrate ultimately 130/70 range. The most recent blood pressure recording is from March 2021 her blood pressure was 136/66 by report.     10/18/19 OV:  increase the hydralazine to 75mg in the morning, 50mg in the afternoon and 50mg at bedtime.    Recent blood pressures are 147/80 and 160/80.    Dr Pena - what dose should she be on?  Spouse is trying to get meds set up in pill packs so this issue is eliminated.  -sammi

## 2021-08-05 NOTE — TELEPHONE ENCOUNTER
----- Message from Malaika Figueroa sent at 8/4/2021  2:44 PM CDT -----  Regarding: [facility]  General phone call:    Caller: [facility]   Primary cardiologist: [facility]   Detailed reason for call: Hydralazine  - she needs to verify the dosage.    Best phone number: [phone]  Best time to contact: any  Ok to leave a detailed message? yes    Additional Info:

## 2021-08-06 RX ORDER — HYDRALAZINE HYDROCHLORIDE 100 MG/1
100 TABLET, FILM COATED ORAL 3 TIMES DAILY
Qty: 270 TABLET | Refills: 0 | Status: SHIPPED | OUTPATIENT
Start: 2021-08-06 | End: 2021-08-17

## 2021-08-06 NOTE — TELEPHONE ENCOUNTER
Recommendations discussed with Vernon.  Vernon verbalized understanding and pt seems to be tolerating hydralazine 100 mg tid fine.  Vernon open to making an appt, and hung up while on hold.  Message sent to scheduling.      Discussed with Mary - they last filled 50 mg tablets tid on 6/29/21, and 25 mg tabs (take 2 tabs 50 mg tid) on 4/20/21.  -sammi

## 2021-08-17 ENCOUNTER — OFFICE VISIT (OUTPATIENT)
Dept: CARDIOLOGY | Facility: CLINIC | Age: 81
End: 2021-08-17
Payer: COMMERCIAL

## 2021-08-17 VITALS
DIASTOLIC BLOOD PRESSURE: 50 MMHG | SYSTOLIC BLOOD PRESSURE: 92 MMHG | HEART RATE: 96 BPM | BODY MASS INDEX: 27.29 KG/M2 | WEIGHT: 154 LBS | RESPIRATION RATE: 18 BRPM | HEIGHT: 63 IN

## 2021-08-17 DIAGNOSIS — I35.0 AORTIC VALVE STENOSIS, ETIOLOGY OF CARDIAC VALVE DISEASE UNSPECIFIED: ICD-10-CM

## 2021-08-17 DIAGNOSIS — I95.9 HYPOTENSION, UNSPECIFIED HYPOTENSION TYPE: Primary | ICD-10-CM

## 2021-08-17 DIAGNOSIS — E03.9 HYPOTHYROIDISM, UNSPECIFIED TYPE: ICD-10-CM

## 2021-08-17 DIAGNOSIS — R63.4 WEIGHT LOSS: ICD-10-CM

## 2021-08-17 PROCEDURE — 99215 OFFICE O/P EST HI 40 MIN: CPT | Performed by: NURSE PRACTITIONER

## 2021-08-17 RX ORDER — LOSARTAN POTASSIUM 50 MG/1
25 TABLET ORAL EVERY MORNING
COMMUNITY
End: 2022-01-21

## 2021-08-17 RX ORDER — LEVOTHYROXINE SODIUM 137 UG/1
68.5 TABLET ORAL DAILY
COMMUNITY

## 2021-08-17 RX ORDER — HYDRALAZINE HYDROCHLORIDE 100 MG/1
50 TABLET, FILM COATED ORAL 2 TIMES DAILY
Qty: 270 TABLET | Refills: 0
Start: 2021-08-17 | End: 2021-09-14

## 2021-08-17 RX ORDER — HYDRALAZINE HYDROCHLORIDE 100 MG/1
50 TABLET, FILM COATED ORAL 2 TIMES DAILY
Qty: 270 TABLET | Refills: 0
Start: 2021-08-17 | End: 2021-08-17

## 2021-08-17 RX ORDER — ARIPIPRAZOLE 5 MG/1
5 TABLET ORAL DAILY
COMMUNITY
End: 2021-09-30

## 2021-08-17 ASSESSMENT — MIFFLIN-ST. JEOR: SCORE: 1124.73

## 2021-08-17 NOTE — LETTER
8/17/2021    YO PALUMBO  [facility] [address] [facility] [address]    RE: Jodee Johnson       Dear Colleague,    I had the pleasure of seeing Jodee Johnson in the [facility].            Assessment/Recommendations   Assessment:    1.  Hypertension: Blood pressure today is check blood pressure was 80/56 on both arms.  She reports mild lightheadedness and dizziness.    Her blood pressures been running systolic in 90s to 80s at home.    Currently on hydralazine 100 mg 3 times a day.  Patient skipped her noon dose yesterday.  Clonidine 0.1 mg twice a day  Losartan 50 mg daily    2.  Mild aortic stenosis: Echocardiogram in August 2019 showed normal systolic function with mean gradient aortic valve of 10 mmHg.  Denies shortness of breath, lightheadedness or dizziness.    3.  Dyslipidemia: On rosuvastatin 5 mg daily.  Most recent lipid profile done in February 2021 showed total cholesterol 136, triglycerides 68, HDL 56 and LDL 66.    4.  History of premature atrial complexes: Most recent EKG done in March 2021 showed sinus rhythm heart rate in 80s with frequent PACs noted.    5.  Unintentional weight loss weight loss: Patient weight was about 178 pounds in March 2021 during clinic visit with Dr. Pena.  Today her clinic weight was 154 pounds.  Per , patient has poor appetite and decreased calorie intake.     Plan/Recommendation:  -Instructed to hold p.m. dose of hydralazine and clonidine if blood pressures less than 100/60  -Starting tomorrow, decrease hydralazine from 100 mg 3 times a day to 50 mg twice a day.  -Instructed to hold blood pressure medications if blood pressures less than 100/60 as directed with each blood pressure medications  -Encourage increase fluid intake to 64 ounces per day  -Encouraged to seek medical attention if persistent worsening lightheadedness, dizziness or loss of consciousness     Patient's   thinks that patient has not been taking her levothyroxine over the last 1 week.  Reviewed endocrinology note from 8/4/2021.  Patient is supposed to be on levothyroxine 137 mcg half a tablet daily.  Patient was instructed to resume levothyroxine as recommended by endocrinology.  Patient is going to follow-up with Dr. Hall office tomorrow.  Patient and her  were instructed to discuss this with primary care provider.    Recommended patient to follow-up with PCP and discuss about weight loss    Follow up with Dr. Pena in 3/2022 as recommended.  Follow up with me in 10 to 14 days.     History of Present Illness/Subjective    Ms. Jodee Johnson is a 81 year old female with a past medical history of hypertension, dyslipidemia, and mild aortic stenosis who is seen at [facility] for follow up per recommendation from Dr. Pena.    Patient's family recently called in asking to verify the dosage and hydralazine.  Dr. Pena's note from March 2021, patient is on hydralazine 75 mg 3 times a day.  However, patient has been taking 100 mg 3 times a day and has been tolerating.  Dr. Pena recommended repeat echocardiogram with next yearly follow-up in March 2022 to reevaluate her aortic stenosis.    Today, patient reports over the last couple days he she has been experiencing more lightheadedness and dizziness.  She denies fatigue, shortness of breath, dyspnea on exertion, orthopnea, PND, palpitations, chest pain, abdominal fullness/bloating and lower extremity edema.  She does not report any fever, chills, nausea, vomiting, diarrhea or constipation.  Her  noted her blood pressure running systolic in 90s to 80s.  Apparently, they received a medication list and medications through mail order.  They have been following the directions on the medication list that was provided with the mail order.  We spent a great amount of time comparing her current medication list from home and  "medication list that was sent with the mail order.  The only discrepancy noted was levothyroxine medication was missing on the mail order med list.   thinks that patient has not been taking levothyroxine.  They are going to discuss this with primary's provider tomorrow.  Per , patient has poor appetite and eating less.  She also reports inadequate fluid intake.    ECHO August 2019-Reviewed: External report  Final Conclusion    Normal LV size and systolic function with estimated ejection fraction of 65-70%.    No obvious regional LV wall motion abnormalities.    The right ventricle is normal in size and function.    Very mild calcific aortic stenosis (Vmax 2.2 m/s, mean gradient 10 mmHg, MARILU .1.5 cm2).    Estimated pulmonary artery pressure of 28 mmHg + RA pressure.    When compared to the previous echocardiographic report of 1/11/2019, there has been no   significant change.       Physical Examination Review of Systems   BP 92/50 (BP Location: Left arm, Patient Position: Sitting, Cuff Size: Adult Regular)   Pulse 111   Resp 18   Ht 1.588 m (5' 2.5\")   Wt 69.9 kg (154 lb)   BMI 27.72 kg/m    Body mass index is 27.72 kg/m .  Wt Readings from Last 3 Encounters:   08/17/21 69.9 kg (154 lb)   03/26/21 80.7 kg (178 lb)   04/08/20 76.2 kg (168 lb)     General Appearance:   no distress, normal body habitus   ENT/Mouth: membranes moist, no oral lesions or bleeding gums.      EYES:  no scleral icterus, normal conjunctivae   Neck: no carotid bruits or thyromegaly   Chest/Lungs:   lungs are clear to auscultation, no rales or wheezing, equal chest wall expansion    Cardiovascular:    Heart rate regular with occasional ectopic beats. Normal first and second heart sounds with no murmurs, rubs, or gallops; the carotid, radial and posterior tibial pulses are intact, Jugular venous pressure flat, no edema bilaterally    Abdomen:  no organomegaly, masses, bruits, or tenderness; bowel sounds are present "   Extremities   no cyanosis or clubbing.  Radial pulses and Pedal pulses intact and symmetrical.  CMS intact.   Skin: no xanthelasma, warm.    Neurologic: normal  bilateral, no tremors     Psychiatric: alert and oriented x3, calm               Negative unless noted in HPI                                         Medical History  Surgical History Family History Social History   No past medical history on file. No past surgical history on file. No family history on file. Social History     Socioeconomic History     Marital status: Single     Spouse name: Not on file     Number of children: Not on file     Years of education: Not on file     Highest education level: Not on file   Occupational History     Not on file   Tobacco Use     Smoking status: Former Smoker     Packs/day: 0.00     Smokeless tobacco: Never Used   Substance and Sexual Activity     Alcohol use: Not on file     Drug use: Not on file     Sexual activity: Not on file   Other Topics Concern     Not on file   Social History Narrative     Not on file     Social Determinants of Health     Financial Resource Strain:      Difficulty of Paying Living Expenses:    Food Insecurity:      Worried About Running Out of Food in the Last Year:      Ran Out of Food in the Last Year:    Transportation Needs:      Lack of Transportation (Medical):      Lack of Transportation (Non-Medical):    Physical Activity:      Days of Exercise per Week:      Minutes of Exercise per Session:    Stress:      Feeling of Stress :    Social Connections:      Frequency of Communication with Friends and Family:      Frequency of Social Gatherings with Friends and Family:      Attends Mosque Services:      Active Member of Clubs or Organizations:      Attends Club or Organization Meetings:      Marital Status:    Intimate Partner Violence:      Fear of Current or Ex-Partner:      Emotionally Abused:      Physically Abused:      Sexually Abused:           Medications  Allergies    Current Outpatient Medications   Medication Sig Dispense Refill     acyclovir (ZOVIRAX) 400 MG tablet [ACYCLOVIR (ZOVIRAX) 400 MG TABLET] Take 400 mg by mouth daily as needed.       ARIPiprazole (ABILIFY) 5 MG tablet Take 5 mg by mouth daily       buPROPion (WELLBUTRIN SR) 100 MG 12 hr tablet [BUPROPION (WELLBUTRIN SR) 100 MG 12 HR TABLET] Take 100 mg by mouth 2 (two) times a day.       busPIRone (BUSPAR) 15 MG tablet Take 15 mg by mouth 2 times daily        cholecalciferol, vitamin D3, 50 mcg (2,000 unit) capsule [CHOLECALCIFEROL, VITAMIN D3, 50 MCG (2,000 UNIT) CAPSULE] Take 1 capsule by mouth daily.       cloNIDine HCL (CATAPRES) 0.1 MG tablet Take 1 tablet by mouth 2 times daily Hold if BP <100/60       coenzyme Q10 100 mg capsule [COENZYME Q10 100 MG CAPSULE] Take 100 mg by mouth daily.       cyanocobalamin 1000 MCG tablet [CYANOCOBALAMIN 1000 MCG TABLET] Take 1,000 mcg by mouth daily.       DULoxetine (CYMBALTA) 60 MG capsule [DULOXETINE (CYMBALTA) 60 MG CAPSULE] Take 120 mg by mouth daily.       hydrALAZINE (APRESOLINE) 100 MG tablet Take 0.5 tablets (50 mg) by mouth 2 times daily 270 tablet 0     levothyroxine (SYNTHROID/LEVOTHROID) 137 MCG tablet Take 68.5 mcg by mouth daily       losartan (COZAAR) 50 MG tablet Take 50 mg by mouth every morning Hold if BP is <100/60       rosuvastatin (CRESTOR) 5 MG tablet Take 5 mg by mouth At Bedtime       Allergies   Allergen Reactions     Atenolol Unknown     Other reaction(s): Bradycardia, Other reaction(s): Bradycardia     Hydrochlorothiazide Unknown     Other reaction(s): Hypercalcemia, Other reaction(s): Hypercalcemia     Gabapentin Other (See Comments)     Other reaction(s): Confusion, Other reaction(s): Insomnia, Unknown, insomnia, insomnia     Lisinopril Nausea     Other reaction(s): GI Upset, Other reaction(s): Abdominal Pain     Nortriptyline Unknown     Oxycodone Itching     Paroxetine Unknown     Other reaction(s): Drowsiness, Sedation, Other reaction(s):  Sedation     Tetracycline Photosensitivity and Rash     Amlodipine Unknown     Other reaction(s): GI Upset, Vomiting, Other reaction(s): Abdominal Pain     Codeine Itching     Tolerates tylenol 3 at home per pt     Oxycodone-Acetaminophen Itching     Sertraline Unknown     Other reaction(s): Sedation, Other reaction(s): Sedation     Diphenhydramine Anxiety     Other reaction(s): Agitation, Other reaction(s): Unknown         Lab Results    Chemistry/lipid CBC Cardiac Enzymes/BNP/TSH/INR   No results found for: CHOL, HDL, TRIG, CREATININE, BUN, NA, CO2 No results found for: WBC, HGB, HCT, MCV, PLT No results found for: CKTOTAL, CKMB, TROPONINI, BNP, TSH, INR     40 minutes spent on the date of encounter doing chart review, review of outside records, review of test results, patient visit, documentation, discussion with other provider and discussion with family.        This note has been dictated using voice recognition software. Any grammatical, typographical, or context distortions are unintentional and inherent to the software          Thank you for allowing me to participate in the care of your patient.      Sincerely,     [facility]  cc:   No referring provider defined for this encounter.

## 2021-08-17 NOTE — PATIENT INSTRUCTIONS
Jodee Johnson,    It was a pleasure to see you today at the [facility].     My recommendations after this visit include:    - Hold your evening dose of Hydralazine and Clonidine if your BP is <100/60    - Starting tomorrow decrease Hydralazine from 100 mg three times a day to 50 mg twice a day (take half a tablet of 100 mg twice a day).    - You may hold your BP medications if your BP is <100/60 as directed with each BP medications.    - Please go to emergency department if you develop lightheaded, dizziness or passing out    - Follow up with Kat in 10-14 days    - Follow up with Dr. Pena in March 2022 as recommended or sooner if needed    - Please call CHARO Billingsley on [phone]  if you have any questions or concerns    Kat Hernandez, CNP

## 2021-08-17 NOTE — PROGRESS NOTES
Assessment/Recommendations   Assessment:    1.  Hypertension: Blood pressure today is check blood pressure was 80/56 on both arms.  She reports mild lightheadedness and dizziness.    Her blood pressures been running systolic in 90s to 80s at home.    Currently on hydralazine 100 mg 3 times a day.  Patient skipped her noon dose yesterday.  Clonidine 0.1 mg twice a day  Losartan 50 mg daily    2.  Mild aortic stenosis: Echocardiogram in August 2019 showed normal systolic function with mean gradient aortic valve of 10 mmHg.  Denies shortness of breath, lightheadedness or dizziness.    3.  Dyslipidemia: On rosuvastatin 5 mg daily.  Most recent lipid profile done in February 2021 showed total cholesterol 136, triglycerides 68, HDL 56 and LDL 66.    4.  History of premature atrial complexes: Most recent EKG done in March 2021 showed sinus rhythm heart rate in 80s with frequent PACs noted.    5.  Unintentional weight loss weight loss: Patient weight was about 178 pounds in March 2021 during clinic visit with Dr. Pean.  Today her clinic weight was 154 pounds.  Per , patient has poor appetite and decreased calorie intake.     Plan/Recommendation:  -Instructed to hold p.m. dose of hydralazine and clonidine if blood pressures less than 100/60  -Starting tomorrow, decrease hydralazine from 100 mg 3 times a day to 50 mg twice a day.  -Instructed to hold blood pressure medications if blood pressures less than 100/60 as directed with each blood pressure medications  -Encourage increase fluid intake to 64 ounces per day  -Encouraged to seek medical attention if persistent worsening lightheadedness, dizziness or loss of consciousness     Patient's  thinks that patient has not been taking her levothyroxine over the last 1 week.  Reviewed endocrinology note from 8/4/2021.  Patient is supposed to be on levothyroxine 137 mcg half a tablet daily.  Patient was instructed to resume levothyroxine as recommended by  endocrinology.  Patient is going to follow-up with Dr. Hall office tomorrow.  Patient and her  were instructed to discuss this with primary care provider.    Recommended patient to follow-up with PCP and discuss about weight loss    Follow up with Dr. Pena in 3/2022 as recommended.  Follow up with me in 10 to 14 days.     History of Present Illness/Subjective    Ms. Jodee Johnson is a 81 year old female with a past medical history of hypertension, dyslipidemia, and mild aortic stenosis who is seen at [facility] for follow up per recommendation from Dr. Pena.    Patient's family recently called in asking to verify the dosage and hydralazine.  Dr. Pena's note from March 2021, patient is on hydralazine 75 mg 3 times a day.  However, patient has been taking 100 mg 3 times a day and has been tolerating.  Dr. Pena recommended repeat echocardiogram with next yearly follow-up in March 2022 to reevaluate her aortic stenosis.    Today, patient reports over the last couple days he she has been experiencing more lightheadedness and dizziness.  She denies fatigue, shortness of breath, dyspnea on exertion, orthopnea, PND, palpitations, chest pain, abdominal fullness/bloating and lower extremity edema.  She does not report any fever, chills, nausea, vomiting, diarrhea or constipation.  Her  noted her blood pressure running systolic in 90s to 80s.  Apparently, they received a medication list and medications through mail order.  They have been following the directions on the medication list that was provided with the mail order.  We spent a great amount of time comparing her current medication list from home and medication list that was sent with the mail order.  The only discrepancy noted was levothyroxine medication was missing on the mail order med list.   thinks that patient has not been taking levothyroxine.  They are going to discuss this with primary's provider  "tomorrow.  Per , patient has poor appetite and eating less.  She also reports inadequate fluid intake.    ECHO August 2019-Reviewed: External report  Final Conclusion    Normal LV size and systolic function with estimated ejection fraction of 65-70%.    No obvious regional LV wall motion abnormalities.    The right ventricle is normal in size and function.    Very mild calcific aortic stenosis (Vmax 2.2 m/s, mean gradient 10 mmHg, MARILU .1.5 cm2).    Estimated pulmonary artery pressure of 28 mmHg + RA pressure.    When compared to the previous echocardiographic report of 1/11/2019, there has been no   significant change.       Physical Examination Review of Systems   BP 92/50 (BP Location: Left arm, Patient Position: Sitting, Cuff Size: Adult Regular)   Pulse 111   Resp 18   Ht 1.588 m (5' 2.5\")   Wt 69.9 kg (154 lb)   BMI 27.72 kg/m    Body mass index is 27.72 kg/m .  Wt Readings from Last 3 Encounters:   08/17/21 69.9 kg (154 lb)   03/26/21 80.7 kg (178 lb)   04/08/20 76.2 kg (168 lb)     General Appearance:   no distress, normal body habitus   ENT/Mouth: membranes moist, no oral lesions or bleeding gums.      EYES:  no scleral icterus, normal conjunctivae   Neck: no carotid bruits or thyromegaly   Chest/Lungs:   lungs are clear to auscultation, no rales or wheezing, equal chest wall expansion    Cardiovascular:    Heart rate regular with occasional ectopic beats. Normal first and second heart sounds with no murmurs, rubs, or gallops; the carotid, radial and posterior tibial pulses are intact, Jugular venous pressure flat, no edema bilaterally    Abdomen:  no organomegaly, masses, bruits, or tenderness; bowel sounds are present   Extremities   no cyanosis or clubbing.  Radial pulses and Pedal pulses intact and symmetrical.  CMS intact.   Skin: no xanthelasma, warm.    Neurologic: normal  bilateral, no tremors     Psychiatric: alert and oriented x3, calm               Negative unless noted in HPI      "                                    Medical History  Surgical History Family History Social History   No past medical history on file. No past surgical history on file. No family history on file. Social History     Socioeconomic History     Marital status: Single     Spouse name: Not on file     Number of children: Not on file     Years of education: Not on file     Highest education level: Not on file   Occupational History     Not on file   Tobacco Use     Smoking status: Former Smoker     Packs/day: 0.00     Smokeless tobacco: Never Used   Substance and Sexual Activity     Alcohol use: Not on file     Drug use: Not on file     Sexual activity: Not on file   Other Topics Concern     Not on file   Social History Narrative     Not on file     Social Determinants of Health     Financial Resource Strain:      Difficulty of Paying Living Expenses:    Food Insecurity:      Worried About Running Out of Food in the Last Year:      Ran Out of Food in the Last Year:    Transportation Needs:      Lack of Transportation (Medical):      Lack of Transportation (Non-Medical):    Physical Activity:      Days of Exercise per Week:      Minutes of Exercise per Session:    Stress:      Feeling of Stress :    Social Connections:      Frequency of Communication with Friends and Family:      Frequency of Social Gatherings with Friends and Family:      Attends Ep[employer] Services:      Active Member of Clubs or Organizations:      Attends Club or Organization Meetings:      Marital Status:    Intimate Partner Violence:      Fear of Current or Ex-Partner:      Emotionally Abused:      Physically Abused:      Sexually Abused:           Medications  Allergies   Current Outpatient Medications   Medication Sig Dispense Refill     acyclovir (ZOVIRAX) 400 MG tablet [ACYCLOVIR (ZOVIRAX) 400 MG TABLET] Take 400 mg by mouth daily as needed.       ARIPiprazole (ABILIFY) 5 MG tablet Take 5 mg by mouth daily       buPROPion (WELLBUTRIN SR) 100 MG 12  hr tablet [BUPROPION (WELLBUTRIN SR) 100 MG 12 HR TABLET] Take 100 mg by mouth 2 (two) times a day.       busPIRone (BUSPAR) 15 MG tablet Take 15 mg by mouth 2 times daily        cholecalciferol, vitamin D3, 50 mcg (2,000 unit) capsule [CHOLECALCIFEROL, VITAMIN D3, 50 MCG (2,000 UNIT) CAPSULE] Take 1 capsule by mouth daily.       cloNIDine HCL (CATAPRES) 0.1 MG tablet Take 1 tablet by mouth 2 times daily Hold if BP <100/60       coenzyme Q10 100 mg capsule [COENZYME Q10 100 MG CAPSULE] Take 100 mg by mouth daily.       cyanocobalamin 1000 MCG tablet [CYANOCOBALAMIN 1000 MCG TABLET] Take 1,000 mcg by mouth daily.       DULoxetine (CYMBALTA) 60 MG capsule [DULOXETINE (CYMBALTA) 60 MG CAPSULE] Take 120 mg by mouth daily.       hydrALAZINE (APRESOLINE) 100 MG tablet Take 0.5 tablets (50 mg) by mouth 2 times daily 270 tablet 0     levothyroxine (SYNTHROID/LEVOTHROID) 137 MCG tablet Take 68.5 mcg by mouth daily       losartan (COZAAR) 50 MG tablet Take 50 mg by mouth every morning Hold if BP is <100/60       rosuvastatin (CRESTOR) 5 MG tablet Take 5 mg by mouth At Bedtime       Allergies   Allergen Reactions     Atenolol Unknown     Other reaction(s): Bradycardia, Other reaction(s): Bradycardia     Hydrochlorothiazide Unknown     Other reaction(s): Hypercalcemia, Other reaction(s): Hypercalcemia     Gabapentin Other (See Comments)     Other reaction(s): Confusion, Other reaction(s): Insomnia, Unknown, insomnia, insomnia     Lisinopril Nausea     Other reaction(s): GI Upset, Other reaction(s): Abdominal Pain     Nortriptyline Unknown     Oxycodone Itching     Paroxetine Unknown     Other reaction(s): Drowsiness, Sedation, Other reaction(s): Sedation     Tetracycline Photosensitivity and Rash     Amlodipine Unknown     Other reaction(s): GI Upset, Vomiting, Other reaction(s): Abdominal Pain     Codeine Itching     Tolerates tylenol 3 at home per pt     Oxycodone-Acetaminophen Itching     Sertraline Unknown     Other  reaction(s): Sedation, Other reaction(s): Sedation     Diphenhydramine Anxiety     Other reaction(s): Agitation, Other reaction(s): Unknown         Lab Results    Chemistry/lipid CBC Cardiac Enzymes/BNP/TSH/INR   No results found for: CHOL, HDL, TRIG, CREATININE, BUN, NA, CO2 No results found for: WBC, HGB, HCT, MCV, PLT No results found for: CKTOTAL, CKMB, TROPONINI, BNP, TSH, INR     40 minutes spent on the date of encounter doing chart review, review of outside records, review of test results, patient visit, documentation, discussion with other provider and discussion with family.        This note has been dictated using voice recognition software. Any grammatical, typographical, or context distortions are unintentional and inherent to the software

## 2021-08-19 ENCOUNTER — TELEPHONE (OUTPATIENT)
Dept: CARDIOLOGY | Facility: CLINIC | Age: 81
End: 2021-08-19

## 2021-08-19 NOTE — TELEPHONE ENCOUNTER
Call received from Radha, care coordinator from [facility] primary care physician. Writer called back and there was discussion at appt yesterday regarding COQ10 dose, a supplement. Pt believes that PMD prescribed this OTC medication. Writer cannot find documentation of discussion of this med during last few visit with [facility] or CY. Apparently the  gives the patient this medication every 10 days. Radha was calling to seek clarity on this. Writer explained that as far as writer knows, we are not managing this supplement. Our list says 100 mg daily and if family has questions, it can certainly be addressed during next visit with CY. Recommended that they bring updated med list to appt. Radha will call back pt and  regarding this. -[facility]

## 2021-09-14 ENCOUNTER — OFFICE VISIT (OUTPATIENT)
Dept: CARDIOLOGY | Facility: CLINIC | Age: 81
End: 2021-09-14
Attending: NURSE PRACTITIONER
Payer: COMMERCIAL

## 2021-09-14 VITALS
DIASTOLIC BLOOD PRESSURE: 60 MMHG | WEIGHT: 159 LBS | RESPIRATION RATE: 12 BRPM | HEIGHT: 63 IN | SYSTOLIC BLOOD PRESSURE: 92 MMHG | BODY MASS INDEX: 28.17 KG/M2 | HEART RATE: 61 BPM

## 2021-09-14 DIAGNOSIS — E03.9 HYPOTHYROIDISM, UNSPECIFIED TYPE: ICD-10-CM

## 2021-09-14 DIAGNOSIS — I35.0 AORTIC VALVE STENOSIS, ETIOLOGY OF CARDIAC VALVE DISEASE UNSPECIFIED: ICD-10-CM

## 2021-09-14 DIAGNOSIS — E78.5 DYSLIPIDEMIA: ICD-10-CM

## 2021-09-14 DIAGNOSIS — I95.9 HYPOTENSION, UNSPECIFIED HYPOTENSION TYPE: Primary | ICD-10-CM

## 2021-09-14 DIAGNOSIS — R63.4 WEIGHT LOSS: ICD-10-CM

## 2021-09-14 PROCEDURE — 99214 OFFICE O/P EST MOD 30 MIN: CPT | Performed by: NURSE PRACTITIONER

## 2021-09-14 RX ORDER — HYDRALAZINE HYDROCHLORIDE 25 MG/1
12.5 TABLET, FILM COATED ORAL 2 TIMES DAILY
Start: 2021-09-14 | End: 2021-12-22 | Stop reason: DRUGHIGH

## 2021-09-14 RX ORDER — HYDRALAZINE HYDROCHLORIDE 25 MG/1
12.5 TABLET, FILM COATED ORAL 2 TIMES DAILY
Start: 2021-09-14 | End: 2021-09-14 | Stop reason: DRUGHIGH

## 2021-09-14 ASSESSMENT — MIFFLIN-ST. JEOR: SCORE: 1155.35

## 2021-09-14 NOTE — PATIENT INSTRUCTIONS
Jodee Johnson,    It was a pleasure to see you today at the [facility].     My recommendations after this visit include:    - Take Hydralazine 25 mg half a tablet twice a day.     - Take losartan 50 mg half a tablet daily in AM    - Hold your BP medications if your BP is <100/60 with lightheaded or dizziness    - Please bring in your both BP cuffs and medication bottles     - Follow up with Kat in 2 weeks     - Follow up with Dr. Pena in 4 weeks    - Please call CHARO Billingsley on [phone]  if you have any questions or concerns    Kat Hernandez, CNP

## 2021-09-14 NOTE — LETTER
9/14/2021    YO PALUMBO  [facility] [address] [address] [facility] [address]    RE: Jodee Johnson       Dear Colleague,    I had the pleasure of seeing Jodee Johnson in the [facility].            Assessment/Recommendations   Assessment:    1.  Hypertension: Blood pressure today is pressure today is 92/60 and recheck was 80/50.  She is asymptomatic  On parameters to hold BP meds if SBP <100/60  Hypertension regimen includes  Hydralazine 25 mg daily at bedtime-dose was reduced to 100 mg twice a day to  50 mg twice a day during last clinic visit on 8/17/2021  Clonidine 0.1 mg twice a day  Losartan 50 mg daily    Per , her blood pressures been running systolic in 120's on both blood pressure cuff machine.    2.  Mild aortic stenosis: Echocardiogram in August 2019 showed normal systolic function with mean gradient aortic valve of 10 mmHg.  She is asymptomatic.    3.  Dyslipidemia: On rosuvastatin 5 mg daily.  Most recent lipid profile done in February 2021 showed total cholesterol 136, triglycerides 68, HDL 56 and LDL 66.    4.  History of premature atrial complexes: Most recent EKG done in March 2021 showed sinus rhythm heart rate in 80s with frequent PACs noted.  Stable and asymptomatic.    5.  Unintentional weight loss weight loss: She had 20+ weight loss in the last 6 months.  Since last clinic visit, her weight has stabilized  She reports fair appetite.  PCP gave her Zofran for few days for nausea.  Her nausea has resolved.    6.  Hypothyroidism: Most recent TSH level stable.  On levothyroxine 137 MCG daily.    Plan/Recommendation:  -Instructed to take hydralazine 25 mg half a tablet twice a day.  Once she runs out of supply, will decrease the dose further to 10 mg twice a day with new prescription.  -Decrease losartan from 50 mg to 25 mg daily  -Encourage fluid intake of 64 ounces per day  -Encourage regular walking as tolerated.   If she develops shortness of breath, fatigue, will consider repeat echocardiogram   -Instructed to bring blood pressure cuffs   -Instructed to bring medications bottle   follow up with Dr. Pena in 4-6  weeks. Follow up with me in 2 weeks     History of Present Illness/Subjective    Ms. Jodee Johnson is a 81 year old female with a past medical history of hypertension, dyslipidemia, and mild aortic stenosis who is seen at [facility] for continued follow-up..    Patient's family recently called in asking to verify the dosage and hydralazine.  Dr. Pena's note from March 2021, patient is on hydralazine 75 mg 3 times a day.  However, patient has been taking 100 mg 3 times a day and has been tolerating.  Dr. Pena recommended repeat echocardiogram with next yearly follow-up in March 2022 to reevaluate her aortic stenosis.    During last clinic visit, patient's blood pressure was low  systolic in 90s to 80s.  Her hydralazine dose was reduced.  She was given parameters to hold blood pressure medicine if blood pressure continues to remain less than 100/60.  She had a follow-up with PCP.  Note reviewed.  Her labs were stable except she had up with low potassium and positive UA.  Per , she was treated with an antibiotic for UTI.  Her repeat blood work showed normalization of potassium level.  Her recent thyroid level was stable.    Today, Jodee is here accompanied by her .  Her blood pressure still running low but she is asymptomatic.  She denies chest pain, shortness of breath at rest, dyspnea on exertion, PND, orthopnea, abdominal bloating or lower extremity edema.   has decreased her hydralazine dose further to 25 mg once a day at bedtime.  They have been monitoring her blood pressure at home.  Blood pressures been running systolic in one twenties.  Her blood pressure machine has not been checked and see if it is working accurately.    Per , patient has  "follow-up appointment with Dr. Johnson tomorrow.    ECHO August 2019-Reviewed: External report  Final Conclusion    Normal LV size and systolic function with estimated ejection fraction of 65-70%.    No obvious regional LV wall motion abnormalities.    The right ventricle is normal in size and function.    Very mild calcific aortic stenosis (Vmax 2.2 m/s, mean gradient 10 mmHg, MARILU .1.5 cm2).    Estimated pulmonary artery pressure of 28 mmHg + RA pressure.    When compared to the previous echocardiographic report of 1/11/2019, there has been no   significant change.       Physical Examination Review of Systems   BP 92/60 (BP Location: Left arm, Patient Position: Sitting, Cuff Size: Adult Small)   Pulse 61   Resp 12   Ht 1.6 m (5' 3\")   Wt 72.1 kg (159 lb)   BMI 28.17 kg/m    Body mass index is 28.17 kg/m .  Wt Readings from Last 3 Encounters:   09/14/21 72.1 kg (159 lb)   08/17/21 69.9 kg (154 lb)   03/26/21 80.7 kg (178 lb)     General Appearance:   no distress, normal body habitus   ENT/Mouth: membranes moist, no oral lesions or bleeding gums.      EYES:  no scleral icterus, normal conjunctivae   Neck: no carotid bruits or thyromegaly   Chest/Lungs:   lungs are clear to auscultation, no rales or wheezing, equal chest wall expansion    Cardiovascular:    Heart rate regular. Normal first and second heart sounds with no murmurs, rubs, or gallops; the carotid, radial and posterior tibial pulses are intact, Jugular venous pressure flat, no edema bilaterally    Abdomen:  no organomegaly, masses, bruits, or tenderness; bowel sounds are present   Extremities   no cyanosis or clubbing.  Radial pulses and Pedal pulses intact and symmetrical.  CMS intact.   Skin: no xanthelasma, warm.    Neurologic: normal  bilateral, no tremors     Psychiatric: alert and oriented x3, calm            Negative unless noted in HPI                                         Medical History  Surgical History Family History Social History "   No past medical history on file. No past surgical history on file. No family history on file. Social History     Socioeconomic History     Marital status: Single     Spouse name: Not on file     Number of children: Not on file     Years of education: Not on file     Highest education level: Not on file   Occupational History     Not on file   Tobacco Use     Smoking status: Former Smoker     Packs/day: 0.00     Smokeless tobacco: Never Used   Substance and Sexual Activity     Alcohol use: Not on file     Drug use: Not on file     Sexual activity: Not on file   Other Topics Concern     Not on file   Social History Narrative     Not on file     Social Determinants of Health     Financial Resource Strain:      Difficulty of Paying Living Expenses:    Food Insecurity:      Worried About Running Out of Food in the Last Year:      Ran Out of Food in the Last Year:    Transportation Needs:      Lack of Transportation (Medical):      Lack of Transportation (Non-Medical):    Physical Activity:      Days of Exercise per Week:      Minutes of Exercise per Session:    Stress:      Feeling of Stress :    Social Connections:      Frequency of Communication with Friends and Family:      Frequency of Social Gatherings with Friends and Family:      Attends Judaism Services:      Active Member of Clubs or Organizations:      Attends Club or Organization Meetings:      Marital Status:    Intimate Partner Violence:      Fear of Current or Ex-Partner:      Emotionally Abused:      Physically Abused:      Sexually Abused:           Medications  Allergies   Current Outpatient Medications   Medication Sig Dispense Refill     buPROPion (WELLBUTRIN SR) 100 MG 12 hr tablet [BUPROPION (WELLBUTRIN SR) 100 MG 12 HR TABLET] Take 100 mg by mouth 2 (two) times a day.       busPIRone (BUSPAR) 15 MG tablet Take 15 mg by mouth 2 times daily        cholecalciferol, vitamin D3, 50 mcg (2,000 unit) capsule [CHOLECALCIFEROL, VITAMIN D3, 50 MCG (2,000  UNIT) CAPSULE] Take 1 capsule by mouth daily.       cloNIDine HCL (CATAPRES) 0.1 MG tablet Take 1 tablet by mouth 2 times daily Hold if BP <100/60       coenzyme Q10 100 mg capsule [COENZYME Q10 100 MG CAPSULE] Take 100 mg by mouth daily.       cyanocobalamin 1000 MCG tablet [CYANOCOBALAMIN 1000 MCG TABLET] Take 1,000 mcg by mouth daily.       DULoxetine (CYMBALTA) 60 MG capsule [DULOXETINE (CYMBALTA) 60 MG CAPSULE] Take 120 mg by mouth daily.       hydrALAZINE (APRESOLINE) 25 MG tablet Take 0.5 tablets (12.5 mg) by mouth 2 times daily       levothyroxine (SYNTHROID/LEVOTHROID) 137 MCG tablet Take 68.5 mcg by mouth daily       losartan (COZAAR) 50 MG tablet Take 25 mg by mouth every morning Hold if BP is <100/60       rosuvastatin (CRESTOR) 5 MG tablet Take 5 mg by mouth At Bedtime        acyclovir (ZOVIRAX) 400 MG tablet [ACYCLOVIR (ZOVIRAX) 400 MG TABLET] Take 400 mg by mouth daily as needed. (Patient not taking: Reported on 9/14/2021)       ARIPiprazole (ABILIFY) 5 MG tablet Take 5 mg by mouth daily (Patient not taking: Reported on 9/14/2021)      Allergies   Allergen Reactions     Atenolol Unknown     Other reaction(s): Bradycardia, Other reaction(s): Bradycardia     Hydrochlorothiazide Unknown     Other reaction(s): Hypercalcemia, Other reaction(s): Hypercalcemia     Gabapentin Other (See Comments)     Other reaction(s): Confusion, Other reaction(s): Insomnia, Unknown, insomnia, insomnia     Lisinopril Nausea     Other reaction(s): GI Upset, Other reaction(s): Abdominal Pain     Nortriptyline Unknown     Oxycodone Itching     Paroxetine Unknown     Other reaction(s): Drowsiness, Sedation, Other reaction(s): Sedation     Tetracycline Photosensitivity and Rash     Amlodipine Unknown     Other reaction(s): GI Upset, Vomiting, Other reaction(s): Abdominal Pain     Codeine Itching     Tolerates tylenol 3 at home per pt     Oxycodone-Acetaminophen Itching     Sertraline Unknown     Other reaction(s): Sedation, Other  reaction(s): Sedation     Diphenhydramine Anxiety     Other reaction(s): Agitation, Other reaction(s): Unknown         Lab Results    Chemistry/lipid CBC Cardiac Enzymes/BNP/TSH/INR   No results found for: CHOL, HDL, TRIG, CREATININE, BUN, NA, CO2 No results found for: WBC, HGB, HCT, MCV, PLT No results found for: CKTOTAL, CKMB, TROPONINI, BNP, TSH, INR     35 minutes spent on the date of encounter doing chart review, review of outside records, review of test results, interpretation with above tests, patient visit, documentation, discussion with other provider and discussion with family.        This note has been dictated using voice recognition software. Any grammatical, typographical, or context distortions are unintentional and inherent to the software          Thank you for allowing me to participate in the care of your patient.      Sincerely,     BETO Yusuf CNP     [facility]  cc:   BETO Yusuf CNP  [facility]  [address]  [place] [phone]

## 2021-09-14 NOTE — PROGRESS NOTES
Assessment/Recommendations   Assessment:    1.  Hypertension: Blood pressure today is pressure today is 92/60 and recheck was 80/50.  She is asymptomatic  On parameters to hold BP meds if SBP <100/60  Hypertension regimen includes  Hydralazine 25 mg daily at bedtime-dose was reduced to 100 mg twice a day to  50 mg twice a day during last clinic visit on 8/17/2021  Clonidine 0.1 mg twice a day  Losartan 50 mg daily    Per , her blood pressures been running systolic in 120's on both blood pressure cuff machine.    2.  Mild aortic stenosis: Echocardiogram in August 2019 showed normal systolic function with mean gradient aortic valve of 10 mmHg.  She is asymptomatic.    3.  Dyslipidemia: On rosuvastatin 5 mg daily.  Most recent lipid profile done in February 2021 showed total cholesterol 136, triglycerides 68, HDL 56 and LDL 66.    4.  History of premature atrial complexes: Most recent EKG done in March 2021 showed sinus rhythm heart rate in 80s with frequent PACs noted.  Stable and asymptomatic.    5.  Unintentional weight loss weight loss: She had 20+ weight loss in the last 6 months.  Since last clinic visit, her weight has stabilized  She reports fair appetite.  PCP gave her Zofran for few days for nausea.  Her nausea has resolved.    6.  Hypothyroidism: Most recent TSH level stable.  On levothyroxine 137 MCG daily.    Plan/Recommendation:  -Instructed to take hydralazine 25 mg half a tablet twice a day.  Once she runs out of supply, will decrease the dose further to 10 mg twice a day with new prescription.  -Decrease losartan from 50 mg to 25 mg daily  -Encourage fluid intake of 64 ounces per day  -Encourage regular walking as tolerated.  If she develops shortness of breath, fatigue, will consider repeat echocardiogram   -Instructed to bring blood pressure cuffs   -Instructed to bring medications bottle   follow up with Dr. Pena in 4-6  weeks. Follow up with me in 2 weeks     History of Present  Illness/Subjective    Ms. Jodee Johnson is a 81 year old female with a past medical history of hypertension, dyslipidemia, and mild aortic stenosis who is seen at [facility] for continued follow-up..    Patient's family recently called in asking to verify the dosage and hydralazine.  Dr. Pena's note from March 2021, patient is on hydralazine 75 mg 3 times a day.  However, patient has been taking 100 mg 3 times a day and has been tolerating.  Dr. Pena recommended repeat echocardiogram with next yearly follow-up in March 2022 to reevaluate her aortic stenosis.    During last clinic visit, patient's blood pressure was low  systolic in 90s to 80s.  Her hydralazine dose was reduced.  She was given parameters to hold blood pressure medicine if blood pressure continues to remain less than 100/60.  She had a follow-up with PCP.  Note reviewed.  Her labs were stable except she had up with low potassium and positive UA.  Per , she was treated with an antibiotic for UTI.  Her repeat blood work showed normalization of potassium level.  Her recent thyroid level was stable.    Today, Jodee is here accompanied by her .  Her blood pressure still running low but she is asymptomatic.  She denies chest pain, shortness of breath at rest, dyspnea on exertion, PND, orthopnea, abdominal bloating or lower extremity edema.   has decreased her hydralazine dose further to 25 mg once a day at bedtime.  They have been monitoring her blood pressure at home.  Blood pressures been running systolic in one twenties.  Her blood pressure machine has not been checked and see if it is working accurately.    Per , patient has follow-up appointment with Dr. Johnson tomorrow.    ECHO August 2019-Reviewed: External report  Final Conclusion    Normal LV size and systolic function with estimated ejection fraction of 65-70%.    No obvious regional LV wall motion abnormalities.    The right  "ventricle is normal in size and function.    Very mild calcific aortic stenosis (Vmax 2.2 m/s, mean gradient 10 mmHg, MARILU .1.5 cm2).    Estimated pulmonary artery pressure of 28 mmHg + RA pressure.    When compared to the previous echocardiographic report of 1/11/2019, there has been no   significant change.       Physical Examination Review of Systems   BP 92/60 (BP Location: Left arm, Patient Position: Sitting, Cuff Size: Adult Small)   Pulse 61   Resp 12   Ht 1.6 m (5' 3\")   Wt 72.1 kg (159 lb)   BMI 28.17 kg/m    Body mass index is 28.17 kg/m .  Wt Readings from Last 3 Encounters:   09/14/21 72.1 kg (159 lb)   08/17/21 69.9 kg (154 lb)   03/26/21 80.7 kg (178 lb)     General Appearance:   no distress, normal body habitus   ENT/Mouth: membranes moist, no oral lesions or bleeding gums.      EYES:  no scleral icterus, normal conjunctivae   Neck: no carotid bruits or thyromegaly   Chest/Lungs:   lungs are clear to auscultation, no rales or wheezing, equal chest wall expansion    Cardiovascular:    Heart rate regular. Normal first and second heart sounds with no murmurs, rubs, or gallops; the carotid, radial and posterior tibial pulses are intact, Jugular venous pressure flat, no edema bilaterally    Abdomen:  no organomegaly, masses, bruits, or tenderness; bowel sounds are present   Extremities   no cyanosis or clubbing.  Radial pulses and Pedal pulses intact and symmetrical.  CMS intact.   Skin: no xanthelasma, warm.    Neurologic: normal  bilateral, no tremors     Psychiatric: alert and oriented x3, calm            Negative unless noted in HPI                                         Medical History  Surgical History Family History Social History   No past medical history on file. No past surgical history on file. No family history on file. Social History     Socioeconomic History     Marital status: Single     Spouse name: Not on file     Number of children: Not on file     Years of education: Not on file "     Highest education level: Not on file   Occupational History     Not on file   Tobacco Use     Smoking status: Former Smoker     Packs/day: 0.00     Smokeless tobacco: Never Used   Substance and Sexual Activity     Alcohol use: Not on file     Drug use: Not on file     Sexual activity: Not on file   Other Topics Concern     Not on file   Social History Narrative     Not on file     Social Determinants of Health     Financial Resource Strain:      Difficulty of Paying Living Expenses:    Food Insecurity:      Worried About Running Out of Food in the Last Year:      Ran Out of Food in the Last Year:    Transportation Needs:      Lack of Transportation (Medical):      Lack of Transportation (Non-Medical):    Physical Activity:      Days of Exercise per Week:      Minutes of Exercise per Session:    Stress:      Feeling of Stress :    Social Connections:      Frequency of Communication with Friends and Family:      Frequency of Social Gatherings with Friends and Family:      Attends Yazdanism Services:      Active Member of Clubs or Organizations:      Attends Club or Organization Meetings:      Marital Status:    Intimate Partner Violence:      Fear of Current or Ex-Partner:      Emotionally Abused:      Physically Abused:      Sexually Abused:           Medications  Allergies   Current Outpatient Medications   Medication Sig Dispense Refill     buPROPion (WELLBUTRIN SR) 100 MG 12 hr tablet [BUPROPION (WELLBUTRIN SR) 100 MG 12 HR TABLET] Take 100 mg by mouth 2 (two) times a day.       busPIRone (BUSPAR) 15 MG tablet Take 15 mg by mouth 2 times daily        cholecalciferol, vitamin D3, 50 mcg (2,000 unit) capsule [CHOLECALCIFEROL, VITAMIN D3, 50 MCG (2,000 UNIT) CAPSULE] Take 1 capsule by mouth daily.       cloNIDine HCL (CATAPRES) 0.1 MG tablet Take 1 tablet by mouth 2 times daily Hold if BP <100/60       coenzyme Q10 100 mg capsule [COENZYME Q10 100 MG CAPSULE] Take 100 mg by mouth daily.       cyanocobalamin 1000  MCG tablet [CYANOCOBALAMIN 1000 MCG TABLET] Take 1,000 mcg by mouth daily.       DULoxetine (CYMBALTA) 60 MG capsule [DULOXETINE (CYMBALTA) 60 MG CAPSULE] Take 120 mg by mouth daily.       hydrALAZINE (APRESOLINE) 25 MG tablet Take 0.5 tablets (12.5 mg) by mouth 2 times daily       levothyroxine (SYNTHROID/LEVOTHROID) 137 MCG tablet Take 68.5 mcg by mouth daily       losartan (COZAAR) 50 MG tablet Take 25 mg by mouth every morning Hold if BP is <100/60       rosuvastatin (CRESTOR) 5 MG tablet Take 5 mg by mouth At Bedtime        acyclovir (ZOVIRAX) 400 MG tablet [ACYCLOVIR (ZOVIRAX) 400 MG TABLET] Take 400 mg by mouth daily as needed. (Patient not taking: Reported on 9/14/2021)       ARIPiprazole (ABILIFY) 5 MG tablet Take 5 mg by mouth daily (Patient not taking: Reported on 9/14/2021)      Allergies   Allergen Reactions     Atenolol Unknown     Other reaction(s): Bradycardia, Other reaction(s): Bradycardia     Hydrochlorothiazide Unknown     Other reaction(s): Hypercalcemia, Other reaction(s): Hypercalcemia     Gabapentin Other (See Comments)     Other reaction(s): Confusion, Other reaction(s): Insomnia, Unknown, insomnia, insomnia     Lisinopril Nausea     Other reaction(s): GI Upset, Other reaction(s): Abdominal Pain     Nortriptyline Unknown     Oxycodone Itching     Paroxetine Unknown     Other reaction(s): Drowsiness, Sedation, Other reaction(s): Sedation     Tetracycline Photosensitivity and Rash     Amlodipine Unknown     Other reaction(s): GI Upset, Vomiting, Other reaction(s): Abdominal Pain     Codeine Itching     Tolerates tylenol 3 at home per pt     Oxycodone-Acetaminophen Itching     Sertraline Unknown     Other reaction(s): Sedation, Other reaction(s): Sedation     Diphenhydramine Anxiety     Other reaction(s): Agitation, Other reaction(s): Unknown         Lab Results    Chemistry/lipid CBC Cardiac Enzymes/BNP/TSH/INR   No results found for: CHOL, HDL, TRIG, CREATININE, BUN, NA, CO2 No results found  for: WBC, HGB, HCT, MCV, PLT No results found for: CKTOTAL, CKMB, TROPONINI, BNP, TSH, INR     35 minutes spent on the date of encounter doing chart review, review of outside records, review of test results, interpretation with above tests, patient visit, documentation, discussion with other provider and discussion with family.        This note has been dictated using voice recognition software. Any grammatical, typographical, or context distortions are unintentional and inherent to the software

## 2021-09-29 NOTE — PROGRESS NOTES
Assessment/Recommendations   Assessment:    1. Hypertension: Blood pressure today is 106/60. Improved since cutting back on hydralazine and losartan.. She is asymptomatic..  Home blood pressures has been running systolic mostly in 130s. Patient did not bring her blood pressure cuff today.    On parameters to hold BP meds if SBP <100/60  Hypertension regimen includes  Hydralazine 25 mg half a tablet twice a day   clonidine 0.1 mg twice a day  Losartan 25 mg daily    2. Mild aortic stenosis: Echocardiogram in August 2019 showed normal systolic function with mean gradient aortic valve of 10 mmHg. She is asymptomatic although she is mostly sedentary.    3. Dyslipidemia: On rosuvastatin 5 mg daily.  Most recent lipid profile done in February 2021 showed total cholesterol 136, triglycerides 68, HDL 56 and LDL 66.    4. History of premature atrial complexes: Most recent EKG done in March 2021 showed sinus rhythm heart rate in 80s with frequent PACs noted. Her heart rate is regular and stable in 60s today.    5. Unintentional weight loss weight loss: She had 20+ weight loss in the last 6 months. Her weight slightly improved and stabilized to 160 pounds. She reports fair appetite.    6. Hypothyroidism: Most recent TSH level stable. On levothyroxine.    Plan/Recommendation:  -Continue current hypertension regimen.  -Patient's  helps her set up the medication. He was encouraged to call back if patient is taking medication different from her current medication list  -Highly encouraged to bring in blood pressure readings and blood pressure cuff during next follow-up visit with Dr. Pena.  -Maintain adequate fluid intake of 64 ounces of fluid per day.  -Encourage regular exercise as tolerated     Follow up with Dr. Pena as scheduled on 11/4. Follow up with me in 3 months or sooner if needed     History of Present Illness/Subjective    Ms. Jodee Johnson is a 81 year old female with a past medical history of  hypertension, dyslipidemia, and mild aortic stenosis who is seen at [facility] for continued follow-up..    Patient's family recently called in asking to verify the dosage and hydralazine.  Dr. Pena's note from March 2021, patient is on hydralazine 75 mg 3 times a day.  However, patient has been taking 100 mg 3 times a day and has been tolerating.  Dr. Pena recommended repeat echocardiogram with next yearly follow-up in March 2022 to reevaluate her aortic stenosis.    During last clinic visit, patient's blood pressure has overall improved but still on the low side. Her hydralazine and losartan dose were reduced further.    Today, Jodee is here accompanied by her , Vernon. She denies any chest pain, shortness of breath at rest, dyspnea exertion, PND, orthopnea, abdominal bloating, lightheadedness, dizziness, heart palpitation or lower extremity edema. She reports her appetite is fair. They did not bring her current medication list and medication bottles for the visit today. Did not bring her blood pressure cuff today. But patient and  both confirmed that they have been following the correct medication list. Patient had a follow-up with psychiatry, Dr. Guevara on 9/15/2021. Note reviewed.    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 Per , she went out with her retired from nurses to the [facility] and she had a great time. She denies having any cardiac symptoms.    ECHO August 2019-Reviewed: External report  Final Conclusion    Normal LV size and systolic function with estimated ejection fraction of 65-70%.    No obvious regional LV wall motion abnormalities.    The right ventricle is normal in size and function.    Very mild calcific aortic stenosis (Vmax 2.2 m/s, mean gradient 10 mmHg, MARILU .1.5 cm2).    Estimated pulmonary artery pressure of 28 mmHg + RA pressure.    When compared to the previous echocardiographic report of 1/11/2019,  "there has been no   significant change.       Physical Examination Review of Systems   /60 (BP Location: Left arm, Patient Position: Sitting, Cuff Size: Adult Regular)   Pulse 64   Resp 16   Ht 1.6 m (5' 3\")   Wt 72.6 kg (160 lb)   BMI 28.34 kg/m    Body mass index is 28.34 kg/m .  Wt Readings from Last 3 Encounters:   09/30/21 72.6 kg (160 lb)   09/14/21 72.1 kg (159 lb)   08/17/21 69.9 kg (154 lb)     General Appearance:   no distress, normal body habitus   ENT/Mouth: membranes moist, no oral lesions or bleeding gums.      EYES:  no scleral icterus, normal conjunctivae   Neck: no carotid bruits or thyromegaly   Chest/Lungs:   lungs are clear to auscultation, no rales or wheezing, equal chest wall expansion    Cardiovascular:    Heart rate regular. Normal first and second heart sounds with no murmurs, rubs, or gallops; the carotid, radial and posterior tibial pulses are intact, Jugular venous pressure flat, no edema bilaterally    Abdomen:  no organomegaly, masses, bruits, or tenderness; bowel sounds are present   Extremities   no cyanosis or clubbing.  Radial pulses and Pedal pulses intact and symmetrical.  CMS intact.   Skin: no xanthelasma, warm.    Neurologic: normal  bilateral, no tremors     Psychiatric: alert and oriented x3, calm            Negative unless noted in HPI                                         Medical History  Surgical History Family History Social History   No past medical history on file. No past surgical history on file. No family history on file. Social History     Socioeconomic History     Marital status: Single     Spouse name: Not on file     Number of children: Not on file     Years of education: Not on file     Highest education level: Not on file   Occupational History     Not on file   Tobacco Use     Smoking status: Former Smoker     Packs/day: 0.00     Smokeless tobacco: Never Used   Substance and Sexual Activity     Alcohol use: Not on file     Drug use: Not on " file     Sexual activity: Not on file   Other Topics Concern     Not on file   Social History Narrative     Not on file     Social Determinants of Health     Financial Resource Strain:      Difficulty of Paying Living Expenses:    Food Insecurity:      Worried About Running Out of Food in the Last Year:      Ran Out of Food in the Last Year:    Transportation Needs:      Lack of Transportation (Medical):      Lack of Transportation (Non-Medical):    Physical Activity:      Days of Exercise per Week:      Minutes of Exercise per Session:    Stress:      Feeling of Stress :    Social Connections:      Frequency of Communication with Friends and Family:      Frequency of Social Gatherings with Friends and Family:      Attends Nondenominational Services:      Active Member of Clubs or Organizations:      Attends Club or Organization Meetings:      Marital Status:    Intimate Partner Violence:      Fear of Current or Ex-Partner:      Emotionally Abused:      Physically Abused:      Sexually Abused:           Medications  Allergies   Current Outpatient Medications   Medication Sig Dispense Refill     busPIRone (BUSPAR) 15 MG tablet Take 15 mg by mouth 2 times daily        cholecalciferol, vitamin D3, 50 mcg (2,000 unit) capsule [CHOLECALCIFEROL, VITAMIN D3, 50 MCG (2,000 UNIT) CAPSULE] Take 1 capsule by mouth daily.       cloNIDine HCL (CATAPRES) 0.1 MG tablet Take 1 tablet by mouth 2 times daily Hold if BP <100/60       coenzyme Q10 100 mg capsule [COENZYME Q10 100 MG CAPSULE] Take 100 mg by mouth daily.       cyanocobalamin 1000 MCG tablet [CYANOCOBALAMIN 1000 MCG TABLET] Take 1,000 mcg by mouth daily.       DULoxetine (CYMBALTA) 60 MG capsule [DULOXETINE (CYMBALTA) 60 MG CAPSULE] Take 120 mg by mouth daily.       hydrALAZINE (APRESOLINE) 25 MG tablet Take 0.5 tablets (12.5 mg) by mouth 2 times daily       levothyroxine (SYNTHROID/LEVOTHROID) 137 MCG tablet Take 68.5 mcg by mouth daily       losartan (COZAAR) 50 MG tablet Take  25 mg by mouth every morning Hold if BP is <100/60       rosuvastatin (CRESTOR) 5 MG tablet Take 5 mg by mouth At Bedtime       Allergies   Allergen Reactions     Atenolol Unknown     Other reaction(s): Bradycardia, Other reaction(s): Bradycardia     Hydrochlorothiazide Unknown     Other reaction(s): Hypercalcemia, Other reaction(s): Hypercalcemia     Gabapentin Other (See Comments)     Other reaction(s): Confusion, Other reaction(s): Insomnia, Unknown, insomnia, insomnia     Lisinopril Nausea     Other reaction(s): GI Upset, Other reaction(s): Abdominal Pain     Nortriptyline Unknown     Oxycodone Itching     Paroxetine Unknown     Other reaction(s): Drowsiness, Sedation, Other reaction(s): Sedation     Tetracycline Photosensitivity and Rash     Amlodipine Unknown     Other reaction(s): GI Upset, Vomiting, Other reaction(s): Abdominal Pain     Codeine Itching     Tolerates tylenol 3 at home per pt     Oxycodone-Acetaminophen Itching     Sertraline Unknown     Other reaction(s): Sedation, Other reaction(s): Sedation     Diphenhydramine Anxiety     Other reaction(s): Agitation, Other reaction(s): Unknown         Lab Results    Chemistry/lipid CBC Cardiac Enzymes/BNP/TSH/INR   No results found for: CHOL, HDL, TRIG, CREATININE, BUN, NA, CO2 No results found for: WBC, HGB, HCT, MCV, PLT No results found for: CKTOTAL, CKMB, TROPONINI, BNP, TSH, INR     30 minutes spent on the date of encounter doing chart review, review of outside records, review of test results, patient visit, documentation, discussion with other provider and discussion with family.       This note has been dictated using voice recognition software. Any grammatical, typographical, or context distortions are unintentional and inherent to the software

## 2021-09-29 NOTE — PATIENT INSTRUCTIONS
Jodee Johnson,    It was a pleasure to see you today at the [facility].     My recommendations after this visit include:    - No medications changes made today    - Make sure to drink at least 60-64 ounces of fluid per day    - Follow up with Dr. Pena on 11/4    - Follow up with Kat in 3 months    - Please call CHARO Billingsley on [phone]  if you have any questions or concerns    Kat Hernandez CNP

## 2021-09-30 ENCOUNTER — OFFICE VISIT (OUTPATIENT)
Dept: CARDIOLOGY | Facility: CLINIC | Age: 81
End: 2021-09-30
Attending: NURSE PRACTITIONER
Payer: COMMERCIAL

## 2021-09-30 VITALS
SYSTOLIC BLOOD PRESSURE: 106 MMHG | HEIGHT: 63 IN | DIASTOLIC BLOOD PRESSURE: 60 MMHG | RESPIRATION RATE: 16 BRPM | WEIGHT: 160 LBS | BODY MASS INDEX: 28.35 KG/M2 | HEART RATE: 64 BPM

## 2021-09-30 DIAGNOSIS — I95.9 HYPOTENSION, UNSPECIFIED HYPOTENSION TYPE: Primary | ICD-10-CM

## 2021-09-30 DIAGNOSIS — I35.0 AORTIC VALVE STENOSIS, ETIOLOGY OF CARDIAC VALVE DISEASE UNSPECIFIED: ICD-10-CM

## 2021-09-30 DIAGNOSIS — E78.5 DYSLIPIDEMIA: ICD-10-CM

## 2021-09-30 PROCEDURE — 99214 OFFICE O/P EST MOD 30 MIN: CPT | Performed by: NURSE PRACTITIONER

## 2021-09-30 ASSESSMENT — MIFFLIN-ST. JEOR: SCORE: 1159.89

## 2021-09-30 NOTE — LETTER
9/30/2021    YO PALUMBO  [facility] [address] [address] [facility] [id number]    RE: Jodee Johnson       Dear Colleague,    I had the pleasure of seeing Jodee Johnson in the [facility].            Assessment/Recommendations   Assessment:    1. Hypertension: Blood pressure today is 106/60. Improved since cutting back on hydralazine and losartan.. She is asymptomatic..  Home blood pressures has been running systolic mostly in 130s. Patient did not bring her blood pressure cuff today.    On parameters to hold BP meds if SBP <100/60  Hypertension regimen includes  Hydralazine 25 mg half a tablet twice a day   clonidine 0.1 mg twice a day  Losartan 25 mg daily    2. Mild aortic stenosis: Echocardiogram in August 2019 showed normal systolic function with mean gradient aortic valve of 10 mmHg. She is asymptomatic although she is mostly sedentary.    3. Dyslipidemia: On rosuvastatin 5 mg daily.  Most recent lipid profile done in February 2021 showed total cholesterol 136, triglycerides 68, HDL 56 and LDL 66.    4. History of premature atrial complexes: Most recent EKG done in March 2021 showed sinus rhythm heart rate in 80s with frequent PACs noted. Her heart rate is regular and stable in 60s today.    5. Unintentional weight loss weight loss: She had 20+ weight loss in the last 6 months. Her weight slightly improved and stabilized to 160 pounds. She reports fair appetite.    6. Hypothyroidism: Most recent TSH level stable. On levothyroxine.    Plan/Recommendation:  -Continue current hypertension regimen.  -Patient's  helps her set up the medication. He was encouraged to call back if patient is taking medication different from her current medication list  -Highly encouraged to bring in blood pressure readings and blood pressure cuff during next follow-up visit with Dr. Pena.  -Maintain adequate fluid intake of 64 ounces of fluid per  day.  -Encourage regular exercise as tolerated     Follow up with Dr. Pena as scheduled on 11/4. Follow up with me in 3 months or sooner if needed     History of Present Illness/Subjective    Ms. Jodee Johnson is a 81 year old female with a past medical history of hypertension, dyslipidemia, and mild aortic stenosis who is seen at [facility] for continued follow-up..    Patient's family recently called in asking to verify the dosage and hydralazine.  Dr. Pena's note from March 2021, patient is on hydralazine 75 mg 3 times a day.  However, patient has been taking 100 mg 3 times a day and has been tolerating.  Dr. Pena recommended repeat echocardiogram with next yearly follow-up in March 2022 to reevaluate her aortic stenosis.    During last clinic visit, patient's blood pressure has overall improved but still on the low side. Her hydralazine and losartan dose were reduced further.    Today, Jodee is here accompanied by her , Vernon. She denies any chest pain, shortness of breath at rest, dyspnea exertion, PND, orthopnea, abdominal bloating, lightheadedness, dizziness, heart palpitation or lower extremity edema. She reports her appetite is fair. They did not bring her current medication list and medication bottles for the visit today. Did not bring her blood pressure cuff today. But patient and  both confirmed that they have been following the correct medication list. Patient had a follow-up with psychiatry, Dr. Guevara on 9/15/2021. Note reviewed.    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 She denies having any cardiac symptoms.    ECHO August 2019-Reviewed: External report  Final Conclusion    Normal LV size and systolic function with estimated ejection fraction of 65-70%.    No obvious regional LV wall motion abnormalities.    The right  "ventricle is normal in size and function.    Very mild calcific aortic stenosis (Vmax 2.2 m/s, mean gradient 10 mmHg, MARILU .1.5 cm2).    Estimated pulmonary artery pressure of 28 mmHg + RA pressure.    When compared to the previous echocardiographic report of 1/11/2019, there has been no   significant change.       Physical Examination Review of Systems   /60 (BP Location: Left arm, Patient Position: Sitting, Cuff Size: Adult Regular)   Pulse 64   Resp 16   Ht 1.6 m (5' 3\")   Wt 72.6 kg (160 lb)   BMI 28.34 kg/m    Body mass index is 28.34 kg/m .  Wt Readings from Last 3 Encounters:   09/30/21 72.6 kg (160 lb)   09/14/21 72.1 kg (159 lb)   08/17/21 69.9 kg (154 lb)     General Appearance:   no distress, normal body habitus   ENT/Mouth: membranes moist, no oral lesions or bleeding gums.      EYES:  no scleral icterus, normal conjunctivae   Neck: no carotid bruits or thyromegaly   Chest/Lungs:   lungs are clear to auscultation, no rales or wheezing, equal chest wall expansion    Cardiovascular:    Heart rate regular. Normal first and second heart sounds with no murmurs, rubs, or gallops; the carotid, radial and posterior tibial pulses are intact, Jugular venous pressure flat, no edema bilaterally    Abdomen:  no organomegaly, masses, bruits, or tenderness; bowel sounds are present   Extremities   no cyanosis or clubbing.  Radial pulses and Pedal pulses intact and symmetrical.  CMS intact.   Skin: no xanthelasma, warm.    Neurologic: normal  bilateral, no tremors     Psychiatric: alert and oriented x3, calm            Negative unless noted in HPI                                         Medical History  Surgical History Family History Social History   No past medical history on file. No past surgical history on file. No family history on file. Social History     Socioeconomic History     Marital status: Single     Spouse name: Not on file     Number of children: Not on file     Years of education: Not on " file     Highest education level: Not on file   Occupational History     Not on file   Tobacco Use     Smoking status: Former Smoker     Packs/day: 0.00     Smokeless tobacco: Never Used   Substance and Sexual Activity     Alcohol use: Not on file     Drug use: Not on file     Sexual activity: Not on file   Other Topics Concern     Not on file   Social History Narrative     Not on file     Social Determinants of Health     Financial Resource Strain:      Difficulty of Paying Living Expenses:    Food Insecurity:      Worried About Running Out of Food in the Last Year:      Ran Out of Food in the Last Year:    Transportation Needs:      Lack of Transportation (Medical):      Lack of Transportation (Non-Medical):    Physical Activity:      Days of Exercise per Week:      Minutes of Exercise per Session:    Stress:      Feeling of Stress :    Social Connections:      Frequency of Communication with Friends and Family:      Frequency of Social Gatherings with Friends and Family:      Attends Anabaptist Services:      Active Member of Clubs or Organizations:      Attends Club or Organization Meetings:      Marital Status:    Intimate Partner Violence:      Fear of Current or Ex-Partner:      Emotionally Abused:      Physically Abused:      Sexually Abused:           Medications  Allergies   Current Outpatient Medications   Medication Sig Dispense Refill     busPIRone (BUSPAR) 15 MG tablet Take 15 mg by mouth 2 times daily        cholecalciferol, vitamin D3, 50 mcg (2,000 unit) capsule [CHOLECALCIFEROL, VITAMIN D3, 50 MCG (2,000 UNIT) CAPSULE] Take 1 capsule by mouth daily.       cloNIDine HCL (CATAPRES) 0.1 MG tablet Take 1 tablet by mouth 2 times daily Hold if BP <100/60       coenzyme Q10 100 mg capsule [COENZYME Q10 100 MG CAPSULE] Take 100 mg by mouth daily.       cyanocobalamin 1000 MCG tablet [CYANOCOBALAMIN 1000 MCG TABLET] Take 1,000 mcg by mouth daily.       DULoxetine (CYMBALTA) 60 MG capsule [DULOXETINE  (CYMBALTA) 60 MG CAPSULE] Take 120 mg by mouth daily.       hydrALAZINE (APRESOLINE) 25 MG tablet Take 0.5 tablets (12.5 mg) by mouth 2 times daily       levothyroxine (SYNTHROID/LEVOTHROID) 137 MCG tablet Take 68.5 mcg by mouth daily       losartan (COZAAR) 50 MG tablet Take 25 mg by mouth every morning Hold if BP is <100/60       rosuvastatin (CRESTOR) 5 MG tablet Take 5 mg by mouth At Bedtime       Allergies   Allergen Reactions     Atenolol Unknown     Other reaction(s): Bradycardia, Other reaction(s): Bradycardia     Hydrochlorothiazide Unknown     Other reaction(s): Hypercalcemia, Other reaction(s): Hypercalcemia     Gabapentin Other (See Comments)     Other reaction(s): Confusion, Other reaction(s): Insomnia, Unknown, insomnia, insomnia     Lisinopril Nausea     Other reaction(s): GI Upset, Other reaction(s): Abdominal Pain     Nortriptyline Unknown     Oxycodone Itching     Paroxetine Unknown     Other reaction(s): Drowsiness, Sedation, Other reaction(s): Sedation     Tetracycline Photosensitivity and Rash     Amlodipine Unknown     Other reaction(s): GI Upset, Vomiting, Other reaction(s): Abdominal Pain     Codeine Itching     Tolerates tylenol 3 at home per pt     Oxycodone-Acetaminophen Itching     Sertraline Unknown     Other reaction(s): Sedation, Other reaction(s): Sedation     Diphenhydramine Anxiety     Other reaction(s): Agitation, Other reaction(s): Unknown         Lab Results    Chemistry/lipid CBC Cardiac Enzymes/BNP/TSH/INR   No results found for: CHOL, HDL, TRIG, CREATININE, BUN, NA, CO2 No results found for: WBC, HGB, HCT, MCV, PLT No results found for: CKTOTAL, CKMB, TROPONINI, BNP, TSH, INR     30 minutes spent on the date of encounter doing chart review, review of outside records, review of test results, patient visit, documentation, discussion with other provider and discussion with family.       This note has been dictated using voice recognition software. Any grammatical, typographical,  or context distortions are unintentional and inherent to the software          Thank you for allowing me to participate in the care of your patient.      Sincerely,     BETO Yusuf CNP     [facility]  cc:   BETO Yusuf CNP  [facility]  [address]  [place] [address]

## 2021-12-22 ENCOUNTER — OFFICE VISIT (OUTPATIENT)
Dept: CARDIOLOGY | Facility: CLINIC | Age: 81
End: 2021-12-22
Payer: COMMERCIAL

## 2021-12-22 VITALS
RESPIRATION RATE: 16 BRPM | WEIGHT: 165 LBS | DIASTOLIC BLOOD PRESSURE: 62 MMHG | HEIGHT: 63 IN | BODY MASS INDEX: 29.23 KG/M2 | SYSTOLIC BLOOD PRESSURE: 92 MMHG | HEART RATE: 78 BPM

## 2021-12-22 DIAGNOSIS — R00.2 PALPITATIONS: Primary | ICD-10-CM

## 2021-12-22 DIAGNOSIS — I48.91 ATRIAL FIBRILLATION, UNSPECIFIED TYPE (H): ICD-10-CM

## 2021-12-22 LAB
ATRIAL RATE - MUSE: 300 BPM
DIASTOLIC BLOOD PRESSURE - MUSE: NORMAL MMHG
INTERPRETATION ECG - MUSE: NORMAL
P AXIS - MUSE: NORMAL DEGREES
PR INTERVAL - MUSE: NORMAL MS
QRS DURATION - MUSE: 76 MS
QT - MUSE: 344 MS
QTC - MUSE: 371 MS
R AXIS - MUSE: 2 DEGREES
SYSTOLIC BLOOD PRESSURE - MUSE: NORMAL MMHG
T AXIS - MUSE: 11 DEGREES
VENTRICULAR RATE- MUSE: 70 BPM

## 2021-12-22 PROCEDURE — 93000 ELECTROCARDIOGRAM COMPLETE: CPT | Performed by: INTERNAL MEDICINE

## 2021-12-22 PROCEDURE — 99214 OFFICE O/P EST MOD 30 MIN: CPT | Performed by: INTERNAL MEDICINE

## 2021-12-22 RX ORDER — PHENOL 1.4 %
10 AEROSOL, SPRAY (ML) MUCOUS MEMBRANE AT BEDTIME
COMMUNITY
End: 2022-05-13

## 2021-12-22 RX ORDER — ROPINIROLE 1 MG/1
1 TABLET, FILM COATED ORAL PRN
COMMUNITY
Start: 2021-11-23 | End: 2022-12-19

## 2021-12-22 ASSESSMENT — MIFFLIN-ST. JEOR: SCORE: 1182.57

## 2021-12-22 NOTE — PROGRESS NOTES
HEART CARE ENCOUNTER CONSULTATON NOTE      [facility]  [phone]      Assessment/Recommendations   Assessment/Plan:  1.  Hypertension.  Patient has had down titration of her medications.  Home blood pressures have been variable and we did use her home blood pressure cuff today where blood pressure was approximately 10 mmHg higher than the blood pressure that we got.  At home it has ranged from 104-165.  I am going to asked that she discontinue the hydralazine and monitor her blood pressure closely.  Currently she is only taking 12.5 mg p.o. twice daily of hydralazine.  She will continue with the rest of her medication regimen.    2.  Atrial fibrillation.  Newly discovered today.  Asymptomatic.  Chads vascular score is calculated at 4 which estimates her stroke risk at around 4 to 5%.  I discussed anticoagulation in detail with her and her  today.  I contrasted and compared warfarin versus Eliquis and Xarelto.  They are going to inquire within the next day the cost of Eliquis or Xarelto and the back in touch with my nurse.  They tell me today however that she does have a history of hemorrhagic stroke a few years ago and this will need to be further defined before anticoagulation can be safely initiated.  I placed a call to her primary care provider who is not available until Monday.  She also has been seen by neurology and will try to reach them.  The note from recent neurology visit indicates an MRI has previously shown generalized volume loss, slight hippocampal atrophy and old hemorrhagic infarct along the lateral margin of the right lentiform nucleus since 2016.    3.  Aortic stenosis.  Mild.  Plan follow-up echocardiogram.    Plan.  Patient is going to look into the cost of anticoagulants.  I tried to contact her primary care physician to discuss given prior history of hemorrhagic stroke we will try to reach her neurologist.  2.  I asked her to make an appointment with her primary  care provider.  3.  Discontinue hydralazine and monitor blood pressure.  4.  Echocardiogram, Holter monitor and follow-up in the near future in the atrial fibrillation clinic.    Addendum: I was able to reach her neurology consultant this typically the physicians assistant who reviewed her case and the question about anticoagulation in the setting of prior hemorrhagic stroke.  She reported back to me that they were comfortable with her initiating anticoagulation for atrial fibrillation.  Awaiting to hear back from the patient regarding the cost of Eliquis/Xarelto versus initiating warfarin.       History of Present Illness/Subjective    HPI: Jodee Sy is a 81 year old female who is seen in follow-up.  I have followed her intermittently because of the history of hypertension, mild aortic stenosis and premature atrial complexes.  She is here for routine follow-up today.  She reports no specific cardiovascular symptoms.  She denies dizziness, palpitation, chest pain or shortness of breath.  Her blood pressure medicines have been decreased after being followed by my nurse practitioner colleague  She has not noted any dizziness or lightheadedness.  Doses of hydralazine and losartan were decreased today.  Most recent lab work through her primary care provider is reviewed from 2021 that included normal sodium potassium, BUN of 13 creatinine of 1.14, essentially normal CBC, lipid panel from  with a cholesterol 136, triglycerides 68, LDL of 56  Recent Echocardiogram Results:  JODEE SY    Excellian ID: [id number] Age: 79 : 1940 Ordering Provider: DENYS RODRIGUEZ    Exam Date: 2019 10:21 Gender: F Sonographer: [facility] [place]    Accession #: [id number] Height: 63 in BSA: 1.79 m  BP: 118 / 64    Weight: 166 lbs BMI: 29.4 kg/m  HR: 51          Site: [facility]    Location: Inpatient (Portable) Rhythm: Sinus Bradycardia    Procedure Components: 2D imaging with contrast, Color Doppler,  Spectral Doppler    Indications: Chest pain chest pressure chest tightening    Technical Quality: Good Contrast: Definity    Previous Study: 1/11/2019    Final Conclusion    Normal LV size and systolic function with estimated ejection fraction of 65-70%.    No obvious regional LV wall motion abnormalities.    The right ventricle is normal in size and function.    Very mild calcific aortic stenosis (Vmax 2.2 m/s, mean gradient 10 mmHg, MARILU .1.5 cm2).    Estimated pulmonary artery pressure of 28 mmHg + RA pressure.    When compared to the previous echocardiographic report of 1/11/2019, there has been no   significant change.       ECG today demonstrates atrial fibrillation with controlled ventricular response, nonspecific ST-T changes.     Sinus rhythm with frequent Premature atrial complexes   Nonspecific ST and T wave abnormality   Abnormal ECG   When compared with ECG of 18-OCT-2019 15:06,   Vent. rate has increased BY  29 BPM   ST more depressed Anterolateral leads   Confirmed by MATTIE CAICEDO MD LOC:JN ([id number]) on 3/29/2021 4:09:28 PM  Sinus bradycardia with Premature atrial complexes   Nonspecific ST and T wave abnormality   Abnormal ECG   When compared with ECG of 20-MAY-1999 15:40,   Premature atrial complexes are now Present   ST now depressed in Anterior leads   Nonspecific T wave abnormality now evident in Anterolateral leads   Confirmed by BINA PATE MD LOC:SJ ([id number]) on 10/19/2019 5:12:28 PM           Physical Examination  Review of Systems   Vitals 92/60 2-1 100/60.  Weight 74.8 kg, BMI 29.23, heart rate of 70 and irregular.  Wt Readings from Last 3 Encounters:   09/30/21 72.6 kg (160 lb)   09/14/21 72.1 kg (159 lb)   08/17/21 69.9 kg (154 lb)       General Appearance:   no distress, normal body habitus   ENT/Mouth: membranes moist, no oral lesions or bleeding gums.      EYES:  no scleral icterus, normal conjunctivae   Neck: no carotid bruits or thyromegaly   Chest/Lungs:   lungs are clear to  auscultation, no rales or wheezing,, equal chest wall expansion    Cardiovascular:    Irregular. Normal first and second heart sounds with soft systolic murmurs, rubs, or gallops; the carotid, radial and posterior tibial pulses are intact, Jugular venous pressure within normal limits, no significant edema bilaterally    Abdomen:  no organomegaly, masses, bruits, or tenderness; bowel sounds are present   Extremities: no cyanosis or clubbing   Skin: no xanthelasma, warm.    Neurologic: normal  bilateral, no tremors     Psychiatric: alert and oriented x3, calm        Please refer above for cardiac ROS details.        Medical History  Surgical History Family History Social History          Social History     Socioeconomic History     Marital status: Single     Spouse name: Not on file     Number of children: Not on file     Years of education: Not on file     Highest education level: Not on file   Occupational History     Not on file   Tobacco Use     Smoking status: Former Smoker     Packs/day: 0.00     Smokeless tobacco: Never Used   Substance and Sexual Activity     Alcohol use: Not on file     Drug use: Not on file     Sexual activity: Not on file   Other Topics Concern     Not on file   Social History Narrative     Not on file     Social Determinants of Health     Financial Resource Strain: Not on file   Food Insecurity: Not on file   Transportation Needs: Not on file   Physical Activity: Not on file   Stress: Not on file   Social Connections: Not on file   Intimate Partner Violence: Not on file   Housing Stability: Not on file           Medications  Allergies   Current Outpatient Medications   Medication Sig Dispense Refill     busPIRone (BUSPAR) 15 MG tablet Take 15 mg by mouth 2 times daily        cholecalciferol, vitamin D3, 50 mcg (2,000 unit) capsule [CHOLECALCIFEROL, VITAMIN D3, 50 MCG (2,000 UNIT) CAPSULE] Take 1 capsule by mouth daily.       cloNIDine HCL (CATAPRES) 0.1 MG tablet Take 1 tablet by mouth  2 times daily Hold if BP <100/60       coenzyme Q10 100 mg capsule [COENZYME Q10 100 MG CAPSULE] Take 100 mg by mouth daily.       cyanocobalamin 1000 MCG tablet [CYANOCOBALAMIN 1000 MCG TABLET] Take 1,000 mcg by mouth daily.       DULoxetine (CYMBALTA) 60 MG capsule [DULOXETINE (CYMBALTA) 60 MG CAPSULE] Take 120 mg by mouth daily.       hydrALAZINE (APRESOLINE) 25 MG tablet Take 0.5 tablets (12.5 mg) by mouth 2 times daily       levothyroxine (SYNTHROID/LEVOTHROID) 137 MCG tablet Take 68.5 mcg by mouth daily       losartan (COZAAR) 50 MG tablet Take 25 mg by mouth every morning Hold if BP is <100/60       rosuvastatin (CRESTOR) 5 MG tablet Take 5 mg by mouth At Bedtime          Allergies   Allergen Reactions     Atenolol Unknown     Other reaction(s): Bradycardia, Other reaction(s): Bradycardia     Hydrochlorothiazide Unknown     Other reaction(s): Hypercalcemia, Other reaction(s): Hypercalcemia     Gabapentin Other (See Comments)     Other reaction(s): Confusion, Other reaction(s): Insomnia, Unknown, insomnia, insomnia     Lisinopril Nausea     Other reaction(s): GI Upset, Other reaction(s): Abdominal Pain     Nortriptyline Unknown     Oxycodone Itching     Paroxetine Unknown     Other reaction(s): Drowsiness, Sedation, Other reaction(s): Sedation     Tetracycline Photosensitivity and Rash     Amlodipine Unknown     Other reaction(s): GI Upset, Vomiting, Other reaction(s): Abdominal Pain     Codeine Itching     Tolerates tylenol 3 at home per pt     Oxycodone-Acetaminophen Itching     Sertraline Unknown     Other reaction(s): Sedation, Other reaction(s): Sedation     Diphenhydramine Anxiety     Other reaction(s): Agitation, Other reaction(s): Unknown          Lab Results    Chemistry/lipid CBC Cardiac Enzymes/BNP/TSH/INR   No results for input(s): CHOL, HDL, LDL, TRIG, CHOLHDLRATIO in the last 65214 hours.  No results for input(s): LDL in the last 57532 hours.  No results for input(s): NA, POTASSIUM, CHLORIDE,  CO2, GLC, BUN, CR, GFRESTIMATED, DANTE in the last 15232 hours.    Invalid input(s): GRFESTBLACK  No results for input(s): CR in the last 97935 hours.  No results for input(s): A1C in the last 65306 hours.       No results for input(s): WBC, HGB, HCT, MCV, PLT in the last 83841 hours.  No results for input(s): HGB in the last 36795 hours. No results for input(s): TROPONINI in the last 93382 hours.  No results for input(s): BNP, NTBNPI, NTBNP in the last 25900 hours.  No results for input(s): TSH in the last 72883 hours.  No results for input(s): INR in the last 16298 hours.     Audie Pena MD

## 2021-12-22 NOTE — PATIENT INSTRUCTIONS
Please stop the hydralazine and monitor blood pressure.Please call my nurse Kristy with 8 to 10 readings over the next few weeks.Call sooner if blood pressure consistently higher than 140/90.Her number is [phone].We are going to plan a heart ultrasound.    We discussed that you are in atrial fibrillation.We discussed the risk of stroke related to the irregular heart rhythm and we discussed blood thinners.Options include warfarin which requires blood test monitoring or the newer blood thinners Eliquis 5mg twice a day or Xarelto 20mg daily.Please check with your insurance regarding the cost and call my nurse with what you find out.We will arrange a 24 hour monitor and a return visit in the atrial fibrillation clinic.

## 2021-12-22 NOTE — LETTER
12/22/2021    YO PALUMBO  [facility] [address] [address] [facility] [address]    RE: Jodee Johnson       Dear Colleague,     I had the pleasure of seeing Jodee Johnson in the [facility].    HEART CARE ENCOUNTER CONSULTATON NOTE      [facility]  [phone]      Assessment/Recommendations   Assessment/Plan:  1.  Hypertension.  Patient has had down titration of her medications.  Home blood pressures have been variable and we did use her home blood pressure cuff today where blood pressure was approximately 10 mmHg higher than the blood pressure that we got.  At home it has ranged from 104-165.  I am going to asked that she discontinue the hydralazine and monitor her blood pressure closely.  Currently she is only taking 12.5 mg p.o. twice daily of hydralazine.  She will continue with the rest of her medication regimen.    2.  Atrial fibrillation.  Newly discovered today.  Asymptomatic.  Chads vascular score is calculated at 4 which estimates her stroke risk at around 4 to 5%.  I discussed anticoagulation in detail with her and her  today.  I contrasted and compared warfarin versus Eliquis and Xarelto.  They are going to inquire within the next day the cost of Eliquis or Xarelto and the back in touch with my nurse.  They tell me today however that she does have a history of hemorrhagic stroke a few years ago and this will need to be further defined before anticoagulation can be safely initiated.  I placed a call to her primary care provider who is not available until Monday.  She also has been seen by neurology and will try to reach them.  The note from recent neurology visit indicates an MRI has previously shown generalized volume loss, slight hippocampal atrophy and old hemorrhagic infarct along the lateral margin of the right lentiform nucleus since 2016.    3.  Aortic stenosis.  Mild.  Plan follow-up echocardiogram.    Plan.  Patient is going to  look into the cost of anticoagulants.  I tried to contact her primary care physician to discuss given prior history of hemorrhagic stroke we will try to reach her neurologist.  2.  I asked her to make an appointment with her primary care provider.  3.  Discontinue hydralazine and monitor blood pressure.  4.  Echocardiogram, Holter monitor and follow-up in the near future in the atrial fibrillation clinic.    Addendum: I was able to reach her neurology consultant this typically the physicians assistant who reviewed her case and the question about anticoagulation in the setting of prior hemorrhagic stroke.  She reported back to me that they were comfortable with her initiating anticoagulation for atrial fibrillation.  Awaiting to hear back from the patient regarding the cost of Eliquis/Xarelto versus initiating warfarin.       History of Present Illness/Subjective    HPI: Jodee Sy is a 81 year old female who is seen in follow-up.  I have followed her intermittently because of the history of hypertension, mild aortic stenosis and premature atrial complexes.  She is here for routine follow-up today.  She reports no specific cardiovascular symptoms.  She denies dizziness, palpitation, chest pain or shortness of breath.  Her blood pressure medicines have been decreased after being followed by my nurse practitioner colleague  She has not noted any dizziness or lightheadedness.  Doses of hydralazine and losartan were decreased today.  Most recent lab work through her primary care provider is reviewed from 2021 that included normal sodium potassium, BUN of 13 creatinine of 1.14, essentially normal CBC, lipid panel from  with a cholesterol 136, triglycerides 68, LDL of 56  Recent Echocardiogram Results:  JODEE SY    Excellian ID: [id number] Age: 79 : 1940 Ordering Provider: DENYS RODRIGUEZ    Exam Date: 2019 10:21 Gender: F Sonographer: [facility] [place]    Accession #: [id number]  Height: 63 in BSA: 1.79 m  BP: 118 / 64    Weight: 166 lbs BMI: 29.4 kg/m  HR: 51          Site: [facility]    Location: Inpatient (Portable) Rhythm: Sinus Bradycardia    Procedure Components: 2D imaging with contrast, Color Doppler, Spectral Doppler    Indications: Chest pain chest pressure chest tightening    Technical Quality: Good Contrast: Definity    Previous Study: 1/11/2019    Final Conclusion    Normal LV size and systolic function with estimated ejection fraction of 65-70%.    No obvious regional LV wall motion abnormalities.    The right ventricle is normal in size and function.    Very mild calcific aortic stenosis (Vmax 2.2 m/s, mean gradient 10 mmHg, MARILU .1.5 cm2).    Estimated pulmonary artery pressure of 28 mmHg + RA pressure.    When compared to the previous echocardiographic report of 1/11/2019, there has been no   significant change.       ECG today demonstrates atrial fibrillation with controlled ventricular response, nonspecific ST-T changes.     Sinus rhythm with frequent Premature atrial complexes   Nonspecific ST and T wave abnormality   Abnormal ECG   When compared with ECG of 18-OCT-2019 15:06,   Vent. rate has increased BY  29 BPM   ST more depressed Anterolateral leads   Confirmed by MATTIE CAICEDO MD LOC:JN [id number]) on 3/29/2021 4:09:28 PM  Sinus bradycardia with Premature atrial complexes   Nonspecific ST and T wave abnormality   Abnormal ECG   When compared with ECG of 20-MAY-1999 15:40,   Premature atrial complexes are now Present   ST now depressed in Anterior leads   Nonspecific T wave abnormality now evident in Anterolateral leads   Confirmed by BINA PATE MD LOC:SJ ([id number]) on 10/19/2019 5:12:28 PM           Physical Examination  Review of Systems   Vitals 92/60 2-1 100/60.  Weight 74.8 kg, BMI 29.23, heart rate of 70 and irregular.  Wt Readings from Last 3 Encounters:   09/30/21 72.6 kg (160 lb)   09/14/21 72.1 kg (159 lb)   08/17/21 69.9 kg (154 lb)       General  Appearance:   no distress, normal body habitus   ENT/Mouth: membranes moist, no oral lesions or bleeding gums.      EYES:  no scleral icterus, normal conjunctivae   Neck: no carotid bruits or thyromegaly   Chest/Lungs:   lungs are clear to auscultation, no rales or wheezing,, equal chest wall expansion    Cardiovascular:    Irregular. Normal first and second heart sounds with soft systolic murmurs, rubs, or gallops; the carotid, radial and posterior tibial pulses are intact, Jugular venous pressure within normal limits, no significant edema bilaterally    Abdomen:  no organomegaly, masses, bruits, or tenderness; bowel sounds are present   Extremities: no cyanosis or clubbing   Skin: no xanthelasma, warm.    Neurologic: normal  bilateral, no tremors     Psychiatric: alert and oriented x3, calm        Please refer above for cardiac ROS details.        Medical History  Surgical History Family History Social History          Social History     Socioeconomic History     Marital status: Single     Spouse name: Not on file     Number of children: Not on file     Years of education: Not on file     Highest education level: Not on file   Occupational History     Not on file   Tobacco Use     Smoking status: Former Smoker     Packs/day: 0.00     Smokeless tobacco: Never Used   Substance and Sexual Activity     Alcohol use: Not on file     Drug use: Not on file     Sexual activity: Not on file   Other Topics Concern     Not on file   Social History Narrative     Not on file     Social Determinants of Health     Financial Resource Strain: Not on file   Food Insecurity: Not on file   Transportation Needs: Not on file   Physical Activity: Not on file   Stress: Not on file   Social Connections: Not on file   Intimate Partner Violence: Not on file   Housing Stability: Not on file           Medications  Allergies   Current Outpatient Medications   Medication Sig Dispense Refill     busPIRone (BUSPAR) 15 MG tablet Take 15 mg by  mouth 2 times daily        cholecalciferol, vitamin D3, 50 mcg (2,000 unit) capsule [CHOLECALCIFEROL, VITAMIN D3, 50 MCG (2,000 UNIT) CAPSULE] Take 1 capsule by mouth daily.       cloNIDine HCL (CATAPRES) 0.1 MG tablet Take 1 tablet by mouth 2 times daily Hold if BP <100/60       coenzyme Q10 100 mg capsule [COENZYME Q10 100 MG CAPSULE] Take 100 mg by mouth daily.       cyanocobalamin 1000 MCG tablet [CYANOCOBALAMIN 1000 MCG TABLET] Take 1,000 mcg by mouth daily.       DULoxetine (CYMBALTA) 60 MG capsule [DULOXETINE (CYMBALTA) 60 MG CAPSULE] Take 120 mg by mouth daily.       hydrALAZINE (APRESOLINE) 25 MG tablet Take 0.5 tablets (12.5 mg) by mouth 2 times daily       levothyroxine (SYNTHROID/LEVOTHROID) 137 MCG tablet Take 68.5 mcg by mouth daily       losartan (COZAAR) 50 MG tablet Take 25 mg by mouth every morning Hold if BP is <100/60       rosuvastatin (CRESTOR) 5 MG tablet Take 5 mg by mouth At Bedtime          Allergies   Allergen Reactions     Atenolol Unknown     Other reaction(s): Bradycardia, Other reaction(s): Bradycardia     Hydrochlorothiazide Unknown     Other reaction(s): Hypercalcemia, Other reaction(s): Hypercalcemia     Gabapentin Other (See Comments)     Other reaction(s): Confusion, Other reaction(s): Insomnia, Unknown, insomnia, insomnia     Lisinopril Nausea     Other reaction(s): GI Upset, Other reaction(s): Abdominal Pain     Nortriptyline Unknown     Oxycodone Itching     Paroxetine Unknown     Other reaction(s): Drowsiness, Sedation, Other reaction(s): Sedation     Tetracycline Photosensitivity and Rash     Amlodipine Unknown     Other reaction(s): GI Upset, Vomiting, Other reaction(s): Abdominal Pain     Codeine Itching     Tolerates tylenol 3 at home per pt     Oxycodone-Acetaminophen Itching     Sertraline Unknown     Other reaction(s): Sedation, Other reaction(s): Sedation     Diphenhydramine Anxiety     Other reaction(s): Agitation, Other reaction(s): Unknown          Lab Results     Chemistry/lipid CBC Cardiac Enzymes/BNP/TSH/INR   No results for input(s): CHOL, HDL, LDL, TRIG, CHOLHDLRATIO in the last 46318 hours.  No results for input(s): LDL in the last 14438 hours.  No results for input(s): NA, POTASSIUM, CHLORIDE, CO2, GLC, BUN, CR, GFRESTIMATED, DANTE in the last 73305 hours.    Invalid input(s): GRFESTBLACK  No results for input(s): CR in the last 76924 hours.  No results for input(s): A1C in the last 28371 hours.       No results for input(s): WBC, HGB, HCT, MCV, PLT in the last 29426 hours.  No results for input(s): HGB in the last 66842 hours. No results for input(s): TROPONINI in the last 82425 hours.  No results for input(s): BNP, NTBNPI, NTBNP in the last 73790 hours.  No results for input(s): TSH in the last 21089 hours.  No results for input(s): INR in the last 39478 hours.     Audie Pena MD        Thank you for allowing me to participate in the care of your patient.      Sincerely,     Audie Pena MD     [facility]  cc:   No referring provider defined for this encounter.

## 2021-12-24 ENCOUNTER — TELEPHONE (OUTPATIENT)
Dept: CARDIOLOGY | Facility: CLINIC | Age: 81
End: 2021-12-24
Payer: COMMERCIAL

## 2021-12-24 DIAGNOSIS — I48.91 ATRIAL FIBRILLATION, UNSPECIFIED TYPE (H): Primary | ICD-10-CM

## 2021-12-24 NOTE — TELEPHONE ENCOUNTER
----- Message from Audie Pena MD sent at 12/22/2021  6:01 PM CST -----  New discovery of atrial fibrillation today when she came to clinic.  I reviewed anticoagulation and they were going to check on the cost of Eliquis versus Xarelto.  I asked him to be in touch with us tomorrow.  I did also correspond with her neurologist given prior hemorrhagic stroke who indicated that they would be comfortable with her initiating anticoagulation.  Thank you mdg

## 2021-12-24 NOTE — TELEPHONE ENCOUNTER
Pt LVM stating monitor is covered at 80%, echo at 15%, and labs at 100%.  -[facility]    ----- Message from Audie Pena MD sent at 12/23/2021  5:55 PM CST -----  I called her and they told me they forgot to look into the question of Eliquis and Xarelto and the cost.  I think they may need some assistance.  I did tell them that I had a conversation with her neurologist as well thanks

## 2021-12-27 NOTE — TELEPHONE ENCOUNTER
LVM for pt to call [phone] to discuss.  Xarelto and Eliquis are Tier 3, unsure of cost yet.  -jolie

## 2021-12-27 NOTE — TELEPHONE ENCOUNTER
----- Message from Audie Pena MD sent at 12/25/2021  2:07 PM CST -----  I just wanted to check in and make sure we are following up as a relates to anticoagulation for newly discovered atrial fibrillation.  As outlined I had called her but they forgot to check on the cost and think that we should initiated in the near future.  She does have a high chads vascular score.  Thank you ena

## 2021-12-28 NOTE — TELEPHONE ENCOUNTER
----- Message -----  From: Kay Burns  Sent: 12/28/2021   9:31 AM CST  To: Kristy Lomeli RN    Good morning,   Looks like the patient has a end of year deductible both medications are covered however the cost is $335.70 for 30 days- deductible is $300 and after that is met the cost will be $35.70. if the patient hasn't used a voucher they could use one of those but not the copay cards due to medicare laws.   Hope this helps.   Thank you for allowing me to help with your patient  Kay

## 2021-12-28 NOTE — TELEPHONE ENCOUNTER
----- Message from Audie Pena MD sent at 12/28/2021  9:21 AM CST -----  This is for documentation.  I spoke with her primary care physician regarding recommendations for anticoagulation due to newly discovered atrial fibrillation.  We did talk about some issues related to confusion and possible dementia and her primary care physician had some concerns about her taking her medications regularly.  Ultimately her primary care physician was going to make an appointment to see her in the clinic and then touch base with me.  Thank you JAZZY Pena

## 2021-12-31 ENCOUNTER — TELEPHONE (OUTPATIENT)
Dept: CARDIOLOGY | Facility: CLINIC | Age: 81
End: 2021-12-31
Payer: COMMERCIAL

## 2022-01-03 NOTE — TELEPHONE ENCOUNTER
1/21/2022 1:30 PM (40 min)  Sari    NEW   [facility] ([facility])   Tameka Goncalves, BETO CNP     LVM for pt / Vernon stating a-fib clinic appt date and time.  Asked for call back to [phone] with questions.  -sammi

## 2022-01-03 NOTE — TELEPHONE ENCOUNTER
----- Message from Audie Pena MD sent at 1/2/2022  8:33 PM CST -----  I was awaiting a return call from primary, I can try to follow up with her primary this week  mdg

## 2022-01-05 NOTE — TELEPHONE ENCOUNTER
----- Message from Audie Pena MD sent at 1/5/2022  2:39 PM CST -----  I spoke with the patient and her primary care physician again.  They are going to schedule an appointment as soon as possible to discuss the question of anticoagulation and get back in touch with me.  Thank you mdg 1 to make sure that we remember if there is any way we can flagged this to recheck in a week that would be great. mdg

## 2022-01-10 NOTE — TELEPHONE ENCOUNTER
----- Message from Audie Pena MD sent at 1/10/2022  2:03 PM CST -----  Hi   Her primary left a message clearing her for Eliquis. Can we confirm it s affordable for them and then we can order. Damir Abraham

## 2022-01-11 RX ORDER — EXEMESTANE 25 MG/1
25 TABLET ORAL DAILY
COMMUNITY
Start: 2021-12-29

## 2022-01-11 NOTE — TELEPHONE ENCOUNTER
Recommendations discussed with Vernon and Jodee.  Both verbalized understanding and had no questions.  Rx Eliquis sent to [facility] per Vernon.  Pt scheduled 1/21/22.  -jolie

## 2022-01-11 NOTE — TELEPHONE ENCOUNTER
Yes. Eliquis 5 mg twice daily. Please ask her to stop aspirin if taking avoid nsaids. Please remind her of the increased bleeding risk including small risk of head or stomach bleeding. Careful to avoid falling. Does she have an a fib appt ?  Thank you. JAZZY Pena

## 2022-01-12 NOTE — TELEPHONE ENCOUNTER
MTM Referral order entered.  -[facility]    ----- Message from Audie Pena MD sent at 1/12/2022  9:06 AM CST -----  Regarding: RE:  I think before we start it we have pharm d consult. It s been a challenge. Thank u for your help.  ----- Message -----  From: Kristy Lomeli RN  Sent: 1/12/2022   8:59 AM CST  To: Audie Pena MD  Subject: RE:                                              I added it to her med list - exemestane (AROMASIN) 25 MG tablet.  I did drug interaction with all medications - nothing between exemestane and the rest of the meds.  Eliquis and duloxetine have a warning: Concurrent use of APIXABAN and SELECTIVE SEROTONIN NOREPINEPHRINE REUPTAKE INHIBITOR may result in an increased risk of bleeding.  -jolie  ----- Message -----  From: Audie Pena MD  Sent: 1/12/2022   8:44 AM CST  To: Kristy Lomeli RN  Subject: RE:                                              Thank you. If there is a concern then we can just hold off. I was under the impression that primary was ok but if we have concerns we can await Tameka visit. Do we know what onc med ? To exclude any interaction ? Can we bring her in to afib clinic early or to [facility] to review in person ? Thanks for your help.  ----- Message -----  From: Kristy Lomeli RN  Sent: 1/12/2022   8:41 AM CST  To: Audie Pena MD    1 month is $35.  When I talked to them yesterday I did not get the feeling that they really have a clue yet.  Very concerning.  Oncology also started her on a medication for cancer and just wanted to make sure things were copacetic before sending her a 180 pills.  She has echo and holter 1/17, and will see Tameka 1/21.  -[facility]  ----- Message -----  From: Audie Pena MD  Sent: 1/12/2022   8:15 AM CST  To: Kristy Lomeli RN    I see that the script was for 1 month instead of 3.is this an insurance thing ? Is she scheduled for follow up ? Thanks.

## 2022-01-13 NOTE — PROGRESS NOTES
Medication Therapy Management (MTM) Encounter    ASSESSMENT:                            Atrial Fibrillation: Newly diagnosed.  Patient has not started Eliquis yet since they have not received it in the mail.  They do have a lumpectomy on 1/20/2021, therefore I recommended that they start after with an okay from the surgeon.  We did review the importance of not skipping a dose, monitoring for significant bleeding, avoiding NSAIDs, and going to the emergency room if she falls and hits her head.  He will closely monitor her for this.  It sounds like she is not great with taking her medicines and needs a reminder, but Vernon does remind her and then she ultimately does.  If it appears that she is following and/or missing doses of medicines, consider watchman if appropriate.  I do agree that I would avoid warfarin for her due to the complexity of INR testing and dose adjustment.  They are aware of the price and did not seem too concerned.    Hypertension: Last blood pressure in clinic was at goal.  Continue to monitor.  Her home blood pressures are a bit elevated, but we will continue to monitor in clinic.    Anxiety/depression: Stable at this time.  They will continue to follow with psychiatry.    RLS/insomnia: Appears that ropinirole has been helpful for the leg movements, but she is not sleeping well.  We did review to avoid screens in the middle the night.  I encouraged them to talk to their PCP and/or psychiatrist about her sleep.    Breast cancer: Patient recently started exemestane.  Continue to follow with oncology and she has an upcoming lumpectomy next week.  So far she appears to be tolerating the medication.    Hypothyroidism: Last TSH at goal.    Hyperlipidemia: Okay to continue for primary prevention at this time.    Supplements: Patient to continue current supplements, they plan on stopping co-Q10 1 done witht bottle.  They are interested in lessening their medicines, therefore they can discuss with PCP  about stopping potassium in the future, she is not on any medicines that would lessen potassium and is actually on losartan.  Per chart review, she did have her DEXA done but I do not see the results.  Last vitamin D and B12 level at goal.    PLAN:                            1. Patient to start Eliquis after lumpectomy (has not received in mail yet).     Follow-up: As needed with MTM     SUBJECTIVE/OBJECTIVE:                          Jodee Johnson is a 81 year old female called for an initial visit. She was referred to me from Dr. Pena.  Vernon was on the phone.     Reason for visit: Discussed safety of Eliquis per Dr. Pena.   Medication Adherence/Access: Uses mail order. They called mail order to check the price. They use a pill box and Vernon fills for weeks. Vernon puts them out in the morning and tells her to take, night ones she forgets - does take after a reminder. Would like to off less medications.     Atrial Fibrillation: Newly diagnosed on 12/22/2021 by Dr. Pena.  Patient was asymptomatic.  Patient was started on Eliquis 5 mg twice daily. Not started it yet, did not arrive in the mail.  Recommended to stop aspirin if taking - not taking.  Patient does have a history of hemorrhagic stroke, but starting a DOAC was okayed by neurology.  DOAC was also discussed with primary who had concerns about compliance due to confusion and possible dementia, but it was discussed and okayed by PCP on 1/7/2022.  It was definitely recommended to avoid warfarin. Has had falls but not recently. She does not use NSAIDs.   [id number] score = 4.     Hypertension: Currently taking losartan 50 mg half tablet (25 mg) daily and clonidine.  Dr. Pena discontinued hydralazine 12.5 mg twice daily on 12/22/2021.  Patient does monitor BP at home. Home BP =  167/124, 135/75, 123/87, 162/111, 164/87, 134/91, 120/84, 113/83, 132/77 mmHg.  Denies lightheadedness/dizziness.   Of note, her blood pressure on 1/7/2022 at PCP office was 110/60  mmHg.  History of intolerance to: Atenolol (bradycardia), hydrochlorothiazide (hypercalcemia), lisinopril (abdominal pain), amlodipine (abdominal pain)  BP Readings from Last 3 Encounters:   12/22/21 92/62   09/30/21 106/60   09/14/21 92/60       Anxiety/depression: Currently taking duloxetine 60 mg x 2 (120 mg) daily, clonidine 0.1 mg twice daily, and buspirone 15 mg twice daily. Reports mood is stable.   No longer on Abilify, improvement in tardive dyskinesia with discontinuation  She is following with psychiatry, Dr. Johnson. Last seen 12/29/21  She is following with neurology, due to worsening in cognitive function. MRI of the brain showed mild to moderate generalized volume loss, slight hippocampal atrophy noted bilaterally and old hemorrhagic infarct along the lateral margin of the right lentiform.  She underwent neuropsych testing on 5/20/2021 which was consistent with a diagnosis of mild cognitive impairment-amnestic type, however it was felt that her depression played a role on her performance of testing.   Per chart review, patient has a history of ECT.  Previous Psychiatric Medication Trials:  Bupropion  Citalopram  Paroxetine  Aripiprazole  Escitalopram  Melatonin  Nortriptyline  Olanzapine  Trazodone    RLS/insomnia: Currently taking ropinirole 1 mg by mouth daily and melatonin 10 mg nightly.  She underwent a sleep study on 9/27/2021 due to complaints of increased fatigue and loud snoring.  The study was suggestive of no sleep apnea but she had frequent periodic limb movements and was started on ropinirole and increase to 1 mg per neurology.  She had been on ropinirole in the past, but discontinued for unknown reason.  She was also recommended to take melatonin by neurology. Reports that she is wandering around at night and raiding the fridge. She is awake when doing that. Thinks the RLS is better, less jumping around. Will go to bed for a few hours then up for a half hour, will watch TV then back to to  bed. Does nap during the day.   Last ferritin level = 94.7 in 2019    Breast cancer: Patient was recently diagnosed with invasive lobular carcinoma of the left breast.  Patient underwent routine mammogram fall 2021 and then underwent ultrasound-guided biopsy.  Met with oncology on 12/29/2021.  She was started on exemestane 25 mg daily, started on 1/11/21.  She underwent a genetic consult on 1/7/2022.  She is scheduled for a lumpectomy on 1/20/2022.  No hot flashes, myalgias, arthralgias, vaginal dryness.     Hypothyroidism: Currently taking levothyroxine 137 mcg half tablet (68.5 mcg) daily. Last TSH checked 9/1/21.     Hyperlipidemia: Current therapy includes rosuvastatin 5 mg daily.  No history or heart attack or stroke.   Last lipids checked 2/12/21    Supplements: Currently taking vitamin D3 2000 units daily, co-Q10 100 mg daily, B12 1000 mcg daily, and K 20 meq daily. They plan on stopping coQ10 when done with bottle.   Her B6 level was checked by neurology on 11/23/2021 = 71.9.  No longer taking B6.  Last vitamin D level = 41 on 9/1/2021  Last B12 level = 905 on 4/8/2021  Last K = 3.9 on 1/7/22    Today's Vitals: There were no vitals taken for this visit.     Allergies/ADRs: Reviewed in chart  Past Medical History: Reviewed in chart  Tobacco: She reports that she has quit smoking. She smoked 0.00 packs per day. She has never used smokeless tobacco.  Alcohol: reviewed in chart      ----------------      I spent 40 minutes with this patient today. All changes were made via collaborative practice agreement with YO PALUMBO. A copy of the visit note was provided to the patient's provider(s).    The patient was mailed a summary of these recommendations.     Whitney Prado, Pharm.D., [facility]   Medication Therapy Management Pharmacist   [facility]     Telemedicine Visit Details  Type of service:  Telephone visit  Start Time: 9:34 AM  End Time: 10:14 AM  Originating  Location (patient location): Home  Distant Location (provider location):  [facility]     Medication Therapy Recommendations  No medication therapy recommendations to display

## 2022-01-14 ENCOUNTER — VIRTUAL VISIT (OUTPATIENT)
Dept: PHARMACY | Facility: CLINIC | Age: 82
End: 2022-01-14
Attending: INTERNAL MEDICINE
Payer: COMMERCIAL

## 2022-01-14 DIAGNOSIS — C50.912 MALIGNANT NEOPLASM OF LEFT BREAST IN FEMALE, ESTROGEN RECEPTOR POSITIVE, UNSPECIFIED SITE OF BREAST (H): ICD-10-CM

## 2022-01-14 DIAGNOSIS — I10 BENIGN ESSENTIAL HYPERTENSION: ICD-10-CM

## 2022-01-14 DIAGNOSIS — E03.9 HYPOTHYROIDISM, UNSPECIFIED TYPE: ICD-10-CM

## 2022-01-14 DIAGNOSIS — F32.A DEPRESSION, UNSPECIFIED DEPRESSION TYPE: ICD-10-CM

## 2022-01-14 DIAGNOSIS — Z17.0 MALIGNANT NEOPLASM OF LEFT BREAST IN FEMALE, ESTROGEN RECEPTOR POSITIVE, UNSPECIFIED SITE OF BREAST (H): ICD-10-CM

## 2022-01-14 DIAGNOSIS — I48.0 PAROXYSMAL ATRIAL FIBRILLATION (H): Primary | ICD-10-CM

## 2022-01-14 DIAGNOSIS — Z71.89 ENCOUNTER FOR HERB AND VITAMIN SUPPLEMENT MANAGEMENT: ICD-10-CM

## 2022-01-14 DIAGNOSIS — E78.5 DYSLIPIDEMIA: ICD-10-CM

## 2022-01-14 DIAGNOSIS — G47.00 INSOMNIA, UNSPECIFIED TYPE: ICD-10-CM

## 2022-01-14 PROCEDURE — 99607 MTMS BY PHARM ADDL 15 MIN: CPT | Performed by: PHARMACIST

## 2022-01-14 PROCEDURE — 99605 MTMS BY PHARM NP 15 MIN: CPT | Performed by: PHARMACIST

## 2022-01-14 RX ORDER — POTASSIUM CHLORIDE 1500 MG/1
20 TABLET, EXTENDED RELEASE ORAL DAILY
COMMUNITY
Start: 2021-12-23

## 2022-01-14 NOTE — LETTER
_  Medication List        Prepared on: 1/14/2022     Bring your Medication List when you go to the doctor, hospital, or   emergency room. And, share it with your family or caregivers.     Note any changes to how you take your medications.  Cross out medications when you no longer use them.    Medication How I take it Why I use it Prescriber   apixaban ANTICOAGULANT (ELIQUIS) 5 MG tablet Take 1 tablet (5 mg) by mouth 2 times daily Atrial fibrillation Audie Pena MD   busPIRone (BUSPAR) 15 MG tablet Take 15 mg by mouth 2 times daily  Anxiety Claudia Mandujano MD   cholecalciferol, vitamin D3, 50 mcg (2,000 unit) capsule  Take 1 capsule by mouth daily. Bone Health  Claudia Mandujano MD   cloNIDine HCL (CATAPRES) 0.1 MG tablet Take 1 tablet by mouth 2 times daily Hold if BP <100/60 Blood pressure Claudia Mandujano MD   coenzyme Q10 100 mg capsule Take 100 mg by mouth daily. General Health  Claudia Mandujano MD   cyanocobalamin 1000 MCG tablet Take 1,000 mcg by mouth daily. General health  Claudia Mandujano MD   DULoxetine (CYMBALTA) 60 MG capsule Take 120 mg by mouth daily. Depression Claudia Mandujano MD   exemestane (AROMASIN) 25 MG tablet Take 25 mg by mouth daily Breast Cancer Clovis Edmonds MD   levothyroxine (SYNTHROID/LEVOTHROID) 137 MCG tablet Take 68.5 mcg by mouth daily Hypothyroidism  Claudia Mandujano MD   losartan (COZAAR) 50 MG tablet Take 25 mg by mouth every morning Hold if BP is <100/60 Blood pressure Claudia Mandujano MD   Melatonin 10 MG TABS tablet Take 10 mg by mouth At Bedtime Insomnia Claudia Mandujano MD   potassium chloride ER (KLOR-CON M) 20 MEQ CR tablet Take 20 mEq by mouth daily Low potassium  Claudia Mandujano MD   rOPINIRole (REQUIP) 1 MG tablet Take 1 mg by mouth daily Restless legs Claudia Mandujano MD   rosuvastatin (CRESTOR) 5 MG tablet Take 5 mg by mouth At Bedtime  Cholesterol Claudia Mandujano MD         Add new medications, over-the-counter drugs, herbals, vitamins, or  minerals in the blank rows below.    Medication How I  take it Why I use it Prescriber                          Allergies:      atenolol; hydrochlorothiazide; gabapentin; lisinopril; nortriptyline; oxycodone; paroxetine; tetracycline; amlodipine; codeine; oxycodone-acetaminophen; sertraline; diphenhydramine        Side effects I have had:              Other Information:             My notes and questions:

## 2022-01-14 NOTE — LETTER
January 14, 2022  Jodee LUNDBERG Elizabeth  [address]   [place] [address]    Dear Ms. Johnson, [facility]        Thank you for talking with me on Jan 14, 2022 about your health and medications. As a follow-up to our conversation, I have included two documents:      1. Your Recommended To-Do List has steps you should take to get the best results from your medications.  2. Your Medication List will help you keep track of your medications and how to take them.    If you want to talk about these documents, please call Whitney Prado PharmD at phone: [phone], Monday-Friday 8-4:30pm.    I look forward to working with you and your doctors to make sure your medications work well for you.    Sincerely,    Whitney Prado PharmD

## 2022-01-14 NOTE — LETTER
"Recommended To-Do List      Prepared on: 1/14/2022     You can get the best results from your medications by completing the items on this \"To-Do List.\"      Bring your To-Do List when you go to your doctor. And, share it with your family or caregivers.    My To-Do List:  What we talked about: What I should do:   What my medicines are for, how to know if my medicines are working, made sure my medicines are safe for me and reviewed how to take my medicines.   Take my medicines every day                "

## 2022-01-17 ENCOUNTER — HOSPITAL ENCOUNTER (OUTPATIENT)
Dept: CARDIOLOGY | Facility: HOSPITAL | Age: 82
End: 2022-01-17
Attending: INTERNAL MEDICINE
Payer: COMMERCIAL

## 2022-01-17 DIAGNOSIS — I48.91 ATRIAL FIBRILLATION, UNSPECIFIED TYPE (H): ICD-10-CM

## 2022-01-17 DIAGNOSIS — R00.2 PALPITATIONS: ICD-10-CM

## 2022-01-17 LAB — BI-PLANE LVEF ECHO: NORMAL

## 2022-01-17 PROCEDURE — 93306 TTE W/DOPPLER COMPLETE: CPT | Mod: 26 | Performed by: INTERNAL MEDICINE

## 2022-01-17 PROCEDURE — 999N000208 ECHOCARDIOGRAM COMPLETE

## 2022-01-17 PROCEDURE — 93226 XTRNL ECG REC<48 HR SCAN A/R: CPT

## 2022-01-17 PROCEDURE — 255N000002 HC RX 255 OP 636: Performed by: INTERNAL MEDICINE

## 2022-01-17 RX ADMIN — PERFLUTREN 2 ML: 6.52 INJECTION, SUSPENSION INTRAVENOUS at 10:50

## 2022-01-19 ENCOUNTER — TELEPHONE (OUTPATIENT)
Dept: CARDIOLOGY | Facility: CLINIC | Age: 82
End: 2022-01-19
Payer: COMMERCIAL

## 2022-01-19 ENCOUNTER — TELEPHONE (OUTPATIENT)
Dept: PHARMACY | Facility: CLINIC | Age: 82
End: 2022-01-19
Payer: COMMERCIAL

## 2022-01-19 PROCEDURE — 93227 XTRNL ECG REC<48 HR R&I: CPT | Performed by: INTERNAL MEDICINE

## 2022-01-19 NOTE — TELEPHONE ENCOUNTER
----- Message from Audie ePna MD sent at 1/19/2022  2:53 PM CST -----  Very much appreciated.  Rate can we arrange for her to follow-up with me so that we can monitor her blood pressure unless she has an appointment in the A. fib clinic in the near future.  Thank you.  See below.    I spoke with  Vernon, they received Eliquis in the mail yesterday and have not started it yet.  Her lumpectomy is tomorrow.  I did reinforce that they should talk to the surgeon about when to start the Eliquis tomorrow or the following day.  They were understanding of this.      He did have home blood pressure readings to report that he wanted me to share with Dr. Pnea: 167/124, 135/75, 162/111, 151/94 today.      ===View-only below this line===  ----- Message -----  From: Whitney Prado, Nancy  Sent: 1/18/2022   8:33 AM CST  To: Kristy Lomeli RN, Audie Pena MD    Hello! When we spoke on Friday 1/14, they still had not received the medication -- it had been sent to mail order. It is possible they received it yesterday or today, however the lumpectomy is scheduled for 1/20, therefore it did not seem worth taking for a few days before the procedure. The  pretty much did all the talking and he is constantly reminding her to take her medications. He did say that she will take them, but need reminders. We did discuss the importance of not missing doses and not doubling up. I will follow up with them once they are on the Eliquis to see how it is going, but I will wait until her upcoming cardiology appointments. Thanks!  ----- Message -----  From: Audie Pena MD  Sent: 1/17/2022   3:51 PM CST  To: Kristy Lomeli RN, *    Jose Olson  Thanks for seeing her.  I had some concerns about reliability and ability to take her medications as directed although it looks like this is reasonable based on her significant others ability to help.  I also see that you wanted her to wait until after she had the lumpectomy.  Just  wanted your opinion regarding her ability to follow through with the recommendations.  Kristy can we be following up as to when the lumpectomy is scheduled? Ty mdg

## 2022-01-19 NOTE — TELEPHONE ENCOUNTER
I spoke with  Vernon, they received Eliquis in the mail yesterday and have not started it yet.  Her lumpectomy is tomorrow.  I did reinforce that they should talk to the surgeon about when to start the Eliquis tomorrow or the following day.  They were understanding of this.     He did have home blood pressure readings to report that he wanted me to share with Dr. Pena: 167/124, 135/75, 162/111, 151/94 today.

## 2022-01-19 NOTE — TELEPHONE ENCOUNTER
----- Message from Audie Pena MD sent at 1/18/2022  5:20 PM CST -----  Sounds good.  Do we know if they have clear instructions as to when to initiate the Eliquis after the lumpectomy?  mdg

## 2022-01-21 ENCOUNTER — OFFICE VISIT (OUTPATIENT)
Dept: CARDIOLOGY | Facility: CLINIC | Age: 82
End: 2022-01-21
Payer: COMMERCIAL

## 2022-01-21 VITALS
BODY MASS INDEX: 30.48 KG/M2 | HEIGHT: 63 IN | WEIGHT: 172 LBS | DIASTOLIC BLOOD PRESSURE: 66 MMHG | RESPIRATION RATE: 16 BRPM | HEART RATE: 62 BPM | SYSTOLIC BLOOD PRESSURE: 106 MMHG

## 2022-01-21 DIAGNOSIS — I48.19 PERSISTENT ATRIAL FIBRILLATION (H): Primary | ICD-10-CM

## 2022-01-21 DIAGNOSIS — I10 BENIGN ESSENTIAL HYPERTENSION: ICD-10-CM

## 2022-01-21 PROBLEM — C50.412 MALIGNANT NEOPLASM OF UPPER-OUTER QUADRANT OF LEFT BREAST IN FEMALE, ESTROGEN RECEPTOR POSITIVE (H): Status: ACTIVE | Noted: 2021-12-26

## 2022-01-21 PROBLEM — Z17.0 MALIGNANT NEOPLASM OF UPPER-OUTER QUADRANT OF LEFT BREAST IN FEMALE, ESTROGEN RECEPTOR POSITIVE (H): Status: ACTIVE | Noted: 2021-12-26

## 2022-01-21 PROBLEM — R41.89 COGNITIVE IMPAIRMENT: Status: ACTIVE | Noted: 2021-02-23

## 2022-01-21 PROCEDURE — 99215 OFFICE O/P EST HI 40 MIN: CPT | Performed by: NURSE PRACTITIONER

## 2022-01-21 RX ORDER — ACETAMINOPHEN 500 MG
500-1000 TABLET ORAL EVERY 6 HOURS PRN
COMMUNITY
Start: 2022-01-20

## 2022-01-21 RX ORDER — LOSARTAN POTASSIUM 50 MG/1
50 TABLET ORAL EVERY MORNING
Qty: 90 TABLET | Refills: 1 | Status: SHIPPED | OUTPATIENT
Start: 2022-01-21 | End: 2022-06-16

## 2022-01-21 RX ORDER — IBUPROFEN 400 MG/1
400 TABLET, FILM COATED ORAL EVERY 6 HOURS PRN
COMMUNITY
Start: 2022-01-20 | End: 2022-05-13

## 2022-01-21 ASSESSMENT — MIFFLIN-ST. JEOR: SCORE: 1206.38

## 2022-01-21 NOTE — PATIENT INSTRUCTIONS
Jodee Johnson,    It was a pleasure to see you today at the [facility].     My recommendations after this visit include:    Start Eliquis 5 mg every 12 hours on Saturday to decrease stroke risk with A fib.  Consider left atrial appendage closure with the Watchman device as an alternative to blood thinner.    Increase losartan to 50 mg (whole tab) daily.  Continue to monitor blood pressure and call if consistently < 100/x    Follow up with Kat Hernandez CNP or Dr. Pena in 3-4 weeks    Tameka Goncalves CNP  [facility], Electrophysiology  [phone]  EP nurses [phone]

## 2022-01-21 NOTE — PROGRESS NOTES
HEART CARE ELECTROPHYSIOLOGY CONSULTATON NOTE      [facility]  [phone]    Thank you, Dr. Pena, for asking the [facility] Electrophysiology team to see Ms. Jodee Johnson to evaluate atrial fibrillation.    Assessment/Recommendations   Assessment/Plan:  1.  Persistent Atrial Fibrillation: Newly diagnosed 12/22/2021 as an incidental finding on exam.  Last documentation of sinus rhythm in March 2021.  Asymptomatic.  Ventricular rates are controlled, but tendency toward bradycardia.  She is not on any AV chrissy blocking agents and recommend avoiding these in the future.    We had a lengthy discussion of the physiology and natural progression of atrial fibrillation and treatment options including rate control versus rhythm control depending upon the presence of symptoms.  As she is asymptomatic, continue with rate control strategy.  She was given information to review at home.    She was reassured that atrial fibrillation is not life-threatening, but carries an increased risk for stroke.  She has a [id number] score of 6 for age >75, female gender, hypertension, and prior CVA (hemorrhagic); per current guidelines anticoagulation is recommended, unless risks outweighed the benefits.  She is also at increased risk for bleed due to history of hemorrhagic CVA.  HAS-BLED score 2 for age > 65 and CVA.   We discussed left atrial appendage closure with the Watchman device as alternative to long-term oral anticoagulation.  We discussed the procedure, risks, and medication transition from OAC to aspirin/clopidogrel to low-dose daily aspirin.  She was given information to review at home.  Start Eliquis 5 mg twice daily on Saturday for stroke prophylaxis.    2.  Hypertension: Blood pressure at target today in clinic, but consistently elevated per home readings.  Home blood pressure cuff reads approximately 10 points higher than manual cuff in clinic.  Increase losartan to 50 mg daily.   Continue clonidine.  Continue to monitor blood pressure at home and call if consistently low or symptoms of lightheadedness.    Follow up with Kat Hernandez CNP or Dr. Pena in 3 to 4 weeks     History of Present Illness/Subjective    HPI: Jodee Johnson is a 81 year old female who comes in today accompanied by her  for EP consultation of atrial fibrillation.  She has a history of atrial fibrillation, hypertension, hyperlipidemia, hypothyroidism, mild aortic stenosis, hemorrhagic CVA prior to 2016, anxiety/depression, restless leg syndrome, and breast cancer.  She was started on exemstane and underwent lumpectomy on 1/20/2022.  It is thought her previous hemorrhagic stroke was due to uncontrolled hypertension.  She also has some confusion and possibly dementia.      She was diagnosed with atrial fibrillation with controlled ventricular response as an incidental finding during routine follow-up with Dr. Pena on 12/22/2021.  She had no awareness of arrhythmia and appears to be asymptomatic.  Neurology was okay with initiation of oral anticoagulation therapy.  She also had an MTM consult regarding her medications.  After her lumpectomy, she is to be starting Eliquis 5 mg twice daily for stroke prophylaxis (1/22/2022 per surgeon).    Jodee states that she feels well.  She has no awareness of arrhythmia and denies any decrease in activity tolerance.  She denies fatigue, chest discomfort, palpitations, abdominal fullness/bloating or peripheral edema, shortness of breath, paroxysmal nocturnal dyspnea, orthopnea, lightheadedness, dizziness, pre-syncope, or syncope.  Since hydralazine was discontinued, her blood pressure readings have been 130s to 160s/70s to 100s.    Cardiographics (EKG personally reviewed):  EKG done 12/22/2021 shows atrial fibrillation with controlled ventricular response at 70 bpm  EKG done 3/26/2021 states sinus rhythm at 88 bpm, frequent PACs, QT/QTc interval measures 382/462 ms (image  "unavailable for personal review)    24-hour Holter monitor worn 1/17/2022 shows persistent atrial fibrillation with controlled ventricular response, tendency toward bradycardia.  Average ventricular rate of 64 bpm and a range of 33 to 113 bpm.  3.1-second pause at 6:34 AM.  No significant ventricular ectopy.    ECHO done 1/17/2022:  The left atrium is mildly dilated.  Biplane LVEF is 60%.  Diastolic Doppler findings (E/E' ratio and/or other parameters) suggest left  ventricular filling pressures are indeterminate.  There is mild (1+) mitral regurgitation.  There is mild (1+) aortic regurgitation.  There is trace to mild tricuspid regurgitation.  Doppler findings do not suggest pulmonary hypertension.  The rhythm was atrial fibrillation.    I have reviewed and updated the patient's Past Medical History, Social History, Family History and Medication List.  Outside records reviewed     Physical Examination  Review of Systems   Vitals: /66 (BP Location: Left arm, Patient Position: Sitting, Cuff Size: Adult Regular)   Pulse 62   Resp 16   Ht 1.588 m (5' 2.5\")   Wt 78 kg (172 lb)   BMI 30.96 kg/m    BMI= Body mass index is 30.96 kg/m .  Wt Readings from Last 3 Encounters:   01/21/22 78 kg (172 lb)   12/22/21 74.8 kg (165 lb)   09/30/21 72.6 kg (160 lb)   Home blood pressure cuff 119/76    General Appearance:   Alert, well-appearing and in no acute distress.   HEENT: Atraumatic, normocephalic.  No scleral icterus, normal conjunctivae, EOMs intact, PERRL.  Wearing a mask.   Chest/Lungs:   Chest symmetric, spine straight.  Respirations unlabored.  Lungs are clear to auscultation.   Cardiovascular:    Irregularly irregular rate and rhythm.  Normal first and second heart sounds with no murmurs, rubs, or gallops; radial and posterior tibial pulses are intact, No edema.   Abdomen:  Soft, nondistended, bowel sounds present.   Extremities: No cyanosis or clubbing.   Musculoskeletal: Moves all extremities.     Skin: " Warm, dry, intact.    Neurologic: Mood and affect are appropriate.  Alert and oriented to person, place, time, and situation.        ROS: 10 point ROS neg other than the symptoms noted above in the HPI.         Medical History  Surgical History Family History Social History   Past Medical History:   Diagnosis Date     Atrial fibrillation (H)      Cancer (H)     breast     Depression      Hemorrhagic cerebrovascular accident (CVA) (H)     due to HTN?     Hyperlipidemia      Hypertension      Hypothyroidism      Restless leg syndrome      Past Surgical History:   Procedure Laterality Date     LUMPECTOMY BREAST  01/20/2022     History reviewed. No pertinent family history.     Social History     Socioeconomic History     Marital status: Single     Spouse name: Not on file     Number of children: Not on file     Years of education: Not on file     Highest education level: Not on file   Occupational History     Not on file   Tobacco Use     Smoking status: Former Smoker     Packs/day: 0.00     Smokeless tobacco: Never Used   Substance and Sexual Activity     Alcohol use: Not on file     Drug use: Not on file     Sexual activity: Not on file   Other Topics Concern     Parent/sibling w/ CABG, MI or angioplasty before 65F 55M? Not Asked   Social History Narrative     Not on file     Social Determinants of Health     Financial Resource Strain: Not on file   Food Insecurity: Not on file   Transportation Needs: Not on file   Physical Activity: Not on file   Stress: Not on file   Social Connections: Not on file   Intimate Partner Violence: Not on file   Housing Stability: Not on file           Medications  Allergies   Current Outpatient Medications   Medication Sig Dispense Refill     acetaminophen (TYLENOL) 500 MG tablet Take 500-1,000 mg by mouth every 6 hours as needed for pain       apixaban ANTICOAGULANT (ELIQUIS) 5 MG tablet Take 1 tablet (5 mg) by mouth 2 times daily 60 tablet 11     busPIRone (BUSPAR) 15 MG tablet Take  15 mg by mouth 2 times daily        cholecalciferol, vitamin D3, 50 mcg (2,000 unit) capsule [CHOLECALCIFEROL, VITAMIN D3, 50 MCG (2,000 UNIT) CAPSULE] Take 1 capsule by mouth daily.       cloNIDine HCL (CATAPRES) 0.1 MG tablet Take 1 tablet by mouth 2 times daily Hold if BP <100/60       coenzyme Q10 100 mg capsule [COENZYME Q10 100 MG CAPSULE] Take 100 mg by mouth daily.       cyanocobalamin 1000 MCG tablet [CYANOCOBALAMIN 1000 MCG TABLET] Take 1,000 mcg by mouth daily.       DULoxetine (CYMBALTA) 60 MG capsule [DULOXETINE (CYMBALTA) 60 MG CAPSULE] Take 120 mg by mouth daily.       exemestane (AROMASIN) 25 MG tablet Take 25 mg by mouth daily       ibuprofen (ADVIL/MOTRIN) 400 MG tablet Take 400 mg by mouth every 6 hours as needed for pain       levothyroxine (SYNTHROID/LEVOTHROID) 137 MCG tablet Take 68.5 mcg by mouth daily       losartan (COZAAR) 50 MG tablet Take 1 tablet (50 mg) by mouth every morning Hold if BP is <100/60 90 tablet 1     Melatonin 10 MG TABS tablet Take 10 mg by mouth At Bedtime       potassium chloride ER (KLOR-CON M) 20 MEQ CR tablet Take 20 mEq by mouth daily       rOPINIRole (REQUIP) 1 MG tablet Take 1 mg by mouth daily       rosuvastatin (CRESTOR) 5 MG tablet Take 5 mg by mouth At Bedtime          Allergies   Allergen Reactions     Atenolol Unknown     Other reaction(s): Bradycardia, Other reaction(s): Bradycardia     Hydrochlorothiazide Unknown     Other reaction(s): Hypercalcemia, Other reaction(s): Hypercalcemia     Gabapentin Other (See Comments)     Other reaction(s): Confusion, Other reaction(s): Insomnia, Unknown, insomnia, insomnia     Lisinopril Nausea     Other reaction(s): GI Upset, Other reaction(s): Abdominal Pain     Nortriptyline Unknown     Oxycodone Itching     Paroxetine Unknown     Other reaction(s): Drowsiness, Sedation, Other reaction(s): Sedation     Tetracycline Photosensitivity and Rash     Amlodipine Unknown     Other reaction(s): GI Upset, Vomiting, Other  reaction(s): Abdominal Pain     Codeine Itching     Tolerates tylenol 3 at home per pt     Oxycodone-Acetaminophen Itching     Sertraline Unknown     Other reaction(s): Sedation, Other reaction(s): Sedation     Diphenhydramine Anxiety     Other reaction(s): Agitation, Other reaction(s): Unknown          Lab Results    Chemistry/lipid CBC Cardiac Enzymes/BNP/TSH/INR   No results for input(s): CHOL, HDL, LDL, TRIG, CHOLHDLRATIO in the last 35790 hours.  No results for input(s): LDL in the last 22737 hours.  No results for input(s): NA, POTASSIUM, CHLORIDE, CO2, GLC, BUN, CR, GFRESTIMATED, DANTE in the last 11572 hours.    Invalid input(s): GRFESTBLACK  No results for input(s): CR in the last 23256 hours.  No results for input(s): A1C in the last 39209 hours.    Outside labs reviewed.   No results for input(s): WBC, HGB, HCT, MCV, PLT in the last 23866 hours.  No results for input(s): HGB in the last 15571 hours. No results for input(s): TROPONINI in the last 49647 hours.  No results for input(s): BNP, NTBNPI, NTBNP in the last 56152 hours.  No results for input(s): TSH in the last 43200 hours.  No results for input(s): INR in the last 59641 hours.   65 minutes were spent on the date of encounter performing chart review, history and exam, documentation, and further activities as noted above.

## 2022-01-21 NOTE — LETTER
1/21/2022    YO PALUMBO  [facility] [address] [facility] [address]    RE: Jodee LUNDBERG Elizabeth       Dear Colleague,     I had the pleasure of seeing Jodee Johnson in the [facility].    HEART CARE ELECTROPHYSIOLOGY CONSULTATON NOTE      [facility]  [phone]    Thank you, Dr. Pena, for asking the [facility] Electrophysiology team to see Ms. Jodee Johnson to evaluate atrial fibrillation.    Assessment/Recommendations   Assessment/Plan:  1.  Persistent Atrial Fibrillation: Newly diagnosed 12/22/2021 as an incidental finding on exam.  Last documentation of sinus rhythm in March 2021.  Asymptomatic.  Ventricular rates are controlled, but tendency toward bradycardia.  She is not on any AV chrissy blocking agents and recommend avoiding these in the future.    We had a lengthy discussion of the physiology and natural progression of atrial fibrillation and treatment options including rate control versus rhythm control depending upon the presence of symptoms.  As she is asymptomatic, continue with rate control strategy.  She was given information to review at home.    She was reassured that atrial fibrillation is not life-threatening, but carries an increased risk for stroke.  She has a [id number] score of 6 for age >75, female gender, hypertension, and prior CVA (hemorrhagic); per current guidelines anticoagulation is recommended, unless risks outweighed the benefits.  She is also at increased risk for bleed due to history of hemorrhagic CVA.  HAS-BLED score 2 for age > 65 and CVA.   We discussed left atrial appendage closure with the Watchman device as alternative to long-term oral anticoagulation.  We discussed the procedure, risks, and medication transition from OAC to aspirin/clopidogrel to low-dose daily aspirin.  She was given information to review at home.  Start Eliquis 5 mg twice daily on Saturday for stroke prophylaxis.    2.   Hypertension: Blood pressure at target today in clinic, but consistently elevated per home readings.  Home blood pressure cuff reads approximately 10 points higher than manual cuff in clinic.  Increase losartan to 50 mg daily.  Continue clonidine.  Continue to monitor blood pressure at home and call if consistently low or symptoms of lightheadedness.    Follow up with Kat Hernandez CNP or Dr. Pena in 3 to 4 weeks     History of Present Illness/Subjective    HPI: Jodee Johnson is a 81 year old female who comes in today accompanied by her  for EP consultation of atrial fibrillation.  She has a history of atrial fibrillation, hypertension, hyperlipidemia, hypothyroidism, mild aortic stenosis, hemorrhagic CVA prior to 2016, anxiety/depression, restless leg syndrome, and breast cancer.  She was started on exemstane and underwent lumpectomy on 1/20/2022.  It is thought her previous hemorrhagic stroke was due to uncontrolled hypertension.  She also has some confusion and possibly dementia.      She was diagnosed with atrial fibrillation with controlled ventricular response as an incidental finding during routine follow-up with Dr. Pena on 12/22/2021.  She had no awareness of arrhythmia and appears to be asymptomatic.  Neurology was okay with initiation of oral anticoagulation therapy.  She also had an MTM consult regarding her medications.  After her lumpectomy, she is to be starting Eliquis 5 mg twice daily for stroke prophylaxis (1/22/2022 per surgeon).    Jodee states that she feels well.  She has no awareness of arrhythmia and denies any decrease in activity tolerance.  She denies fatigue, chest discomfort, palpitations, abdominal fullness/bloating or peripheral edema, shortness of breath, paroxysmal nocturnal dyspnea, orthopnea, lightheadedness, dizziness, pre-syncope, or syncope.  Since hydralazine was discontinued, her blood pressure readings have been 130s to 160s/70s to 100s.    Cardiographics (EKG  "personally reviewed):  EKG done 12/22/2021 shows atrial fibrillation with controlled ventricular response at 70 bpm  EKG done 3/26/2021 states sinus rhythm at 88 bpm, frequent PACs, QT/QTc interval measures 382/462 ms (image unavailable for personal review)    24-hour Holter monitor worn 1/17/2022 shows persistent atrial fibrillation with controlled ventricular response, tendency toward bradycardia.  Average ventricular rate of 64 bpm and a range of 33 to 113 bpm.  3.1-second pause at 6:34 AM.  No significant ventricular ectopy.    ECHO done 1/17/2022:  The left atrium is mildly dilated.  Biplane LVEF is 60%.  Diastolic Doppler findings (E/E' ratio and/or other parameters) suggest left  ventricular filling pressures are indeterminate.  There is mild (1+) mitral regurgitation.  There is mild (1+) aortic regurgitation.  There is trace to mild tricuspid regurgitation.  Doppler findings do not suggest pulmonary hypertension.  The rhythm was atrial fibrillation.    I have reviewed and updated the patient's Past Medical History, Social History, Family History and Medication List.  Outside records reviewed     Physical Examination  Review of Systems   Vitals: /66 (BP Location: Left arm, Patient Position: Sitting, Cuff Size: Adult Regular)   Pulse 62   Resp 16   Ht 1.588 m (5' 2.5\")   Wt 78 kg (172 lb)   BMI 30.96 kg/m    BMI= Body mass index is 30.96 kg/m .  Wt Readings from Last 3 Encounters:   01/21/22 78 kg (172 lb)   12/22/21 74.8 kg (165 lb)   09/30/21 72.6 kg (160 lb)   Home blood pressure cuff 119/76    General Appearance:   Alert, well-appearing and in no acute distress.   HEENT: Atraumatic, normocephalic.  No scleral icterus, normal conjunctivae, EOMs intact, PERRL.  Wearing a mask.   Chest/Lungs:   Chest symmetric, spine straight.  Respirations unlabored.  Lungs are clear to auscultation.   Cardiovascular:    Irregularly irregular rate and rhythm.  Normal first and second heart sounds with no murmurs, " rubs, or gallops; radial and posterior tibial pulses are intact, No edema.   Abdomen:  Soft, nondistended, bowel sounds present.   Extremities: No cyanosis or clubbing.   Musculoskeletal: Moves all extremities.     Skin: Warm, dry, intact.    Neurologic: Mood and affect are appropriate.  Alert and oriented to person, place, time, and situation.        ROS: 10 point ROS neg other than the symptoms noted above in the HPI.         Medical History  Surgical History Family History Social History   Past Medical History:   Diagnosis Date     Atrial fibrillation (H)      Cancer (H)     breast     Depression      Hemorrhagic cerebrovascular accident (CVA) (H)     due to HTN?     Hyperlipidemia      Hypertension      Hypothyroidism      Restless leg syndrome      Past Surgical History:   Procedure Laterality Date     LUMPECTOMY BREAST  01/20/2022     History reviewed. No pertinent family history.     Social History     Socioeconomic History     Marital status: Single     Spouse name: Not on file     Number of children: Not on file     Years of education: Not on file     Highest education level: Not on file   Occupational History     Not on file   Tobacco Use     Smoking status: Former Smoker     Packs/day: 0.00     Smokeless tobacco: Never Used   Substance and Sexual Activity     Alcohol use: Not on file     Drug use: Not on file     Sexual activity: Not on file   Other Topics Concern     Parent/sibling w/ CABG, MI or angioplasty before 65F 55M? Not Asked   Social History Narrative     Not on file     Social Determinants of Health     Financial Resource Strain: Not on file   Food Insecurity: Not on file   Transportation Needs: Not on file   Physical Activity: Not on file   Stress: Not on file   Social Connections: Not on file   Intimate Partner Violence: Not on file   Housing Stability: Not on file           Medications  Allergies   Current Outpatient Medications   Medication Sig Dispense Refill     acetaminophen (TYLENOL)  500 MG tablet Take 500-1,000 mg by mouth every 6 hours as needed for pain       apixaban ANTICOAGULANT (ELIQUIS) 5 MG tablet Take 1 tablet (5 mg) by mouth 2 times daily 60 tablet 11     busPIRone (BUSPAR) 15 MG tablet Take 15 mg by mouth 2 times daily        cholecalciferol, vitamin D3, 50 mcg (2,000 unit) capsule [CHOLECALCIFEROL, VITAMIN D3, 50 MCG (2,000 UNIT) CAPSULE] Take 1 capsule by mouth daily.       cloNIDine HCL (CATAPRES) 0.1 MG tablet Take 1 tablet by mouth 2 times daily Hold if BP <100/60       coenzyme Q10 100 mg capsule [COENZYME Q10 100 MG CAPSULE] Take 100 mg by mouth daily.       cyanocobalamin 1000 MCG tablet [CYANOCOBALAMIN 1000 MCG TABLET] Take 1,000 mcg by mouth daily.       DULoxetine (CYMBALTA) 60 MG capsule [DULOXETINE (CYMBALTA) 60 MG CAPSULE] Take 120 mg by mouth daily.       exemestane (AROMASIN) 25 MG tablet Take 25 mg by mouth daily       ibuprofen (ADVIL/MOTRIN) 400 MG tablet Take 400 mg by mouth every 6 hours as needed for pain       levothyroxine (SYNTHROID/LEVOTHROID) 137 MCG tablet Take 68.5 mcg by mouth daily       losartan (COZAAR) 50 MG tablet Take 1 tablet (50 mg) by mouth every morning Hold if BP is <100/60 90 tablet 1     Melatonin 10 MG TABS tablet Take 10 mg by mouth At Bedtime       potassium chloride ER (KLOR-CON M) 20 MEQ CR tablet Take 20 mEq by mouth daily       rOPINIRole (REQUIP) 1 MG tablet Take 1 mg by mouth daily       rosuvastatin (CRESTOR) 5 MG tablet Take 5 mg by mouth At Bedtime          Allergies   Allergen Reactions     Atenolol Unknown     Other reaction(s): Bradycardia, Other reaction(s): Bradycardia     Hydrochlorothiazide Unknown     Other reaction(s): Hypercalcemia, Other reaction(s): Hypercalcemia     Gabapentin Other (See Comments)     Other reaction(s): Confusion, Other reaction(s): Insomnia, Unknown, insomnia, insomnia     Lisinopril Nausea     Other reaction(s): GI Upset, Other reaction(s): Abdominal Pain     Nortriptyline Unknown     Oxycodone  Itching     Paroxetine Unknown     Other reaction(s): Drowsiness, Sedation, Other reaction(s): Sedation     Tetracycline Photosensitivity and Rash     Amlodipine Unknown     Other reaction(s): GI Upset, Vomiting, Other reaction(s): Abdominal Pain     Codeine Itching     Tolerates tylenol 3 at home per pt     Oxycodone-Acetaminophen Itching     Sertraline Unknown     Other reaction(s): Sedation, Other reaction(s): Sedation     Diphenhydramine Anxiety     Other reaction(s): Agitation, Other reaction(s): Unknown          Lab Results    Chemistry/lipid CBC Cardiac Enzymes/BNP/TSH/INR   No results for input(s): CHOL, HDL, LDL, TRIG, CHOLHDLRATIO in the last 42580 hours.  No results for input(s): LDL in the last 02911 hours.  No results for input(s): NA, POTASSIUM, CHLORIDE, CO2, GLC, BUN, CR, GFRESTIMATED, DANTE in the last 96685 hours.    Invalid input(s): GRFESTBLACK  No results for input(s): CR in the last 30927 hours.  No results for input(s): A1C in the last 32721 hours.    Outside labs reviewed.   No results for input(s): WBC, HGB, HCT, MCV, PLT in the last 94428 hours.  No results for input(s): HGB in the last 80226 hours. No results for input(s): TROPONINI in the last 15048 hours.  No results for input(s): BNP, NTBNPI, NTBNP in the last 30008 hours.  No results for input(s): TSH in the last 30705 hours.  No results for input(s): INR in the last 12172 hours.   65 minutes were spent on the date of encounter performing chart review, history and exam, documentation, and further activities as noted above.

## 2022-02-17 ENCOUNTER — OFFICE VISIT (OUTPATIENT)
Dept: CARDIOLOGY | Facility: CLINIC | Age: 82
End: 2022-02-17
Attending: NURSE PRACTITIONER
Payer: COMMERCIAL

## 2022-02-17 VITALS
BODY MASS INDEX: 31.54 KG/M2 | WEIGHT: 178 LBS | SYSTOLIC BLOOD PRESSURE: 110 MMHG | HEIGHT: 63 IN | RESPIRATION RATE: 16 BRPM | DIASTOLIC BLOOD PRESSURE: 82 MMHG | HEART RATE: 58 BPM

## 2022-02-17 DIAGNOSIS — I10 BENIGN ESSENTIAL HYPERTENSION: ICD-10-CM

## 2022-02-17 DIAGNOSIS — E78.5 DYSLIPIDEMIA: Primary | ICD-10-CM

## 2022-02-17 DIAGNOSIS — I35.0 AORTIC VALVE STENOSIS, ETIOLOGY OF CARDIAC VALVE DISEASE UNSPECIFIED: ICD-10-CM

## 2022-02-17 DIAGNOSIS — I48.19 PERSISTENT ATRIAL FIBRILLATION (H): ICD-10-CM

## 2022-02-17 PROBLEM — I95.9 HYPOTENSION, UNSPECIFIED HYPOTENSION TYPE: Status: RESOLVED | Noted: 2021-08-17 | Resolved: 2022-02-17

## 2022-02-17 PROBLEM — R63.4 WEIGHT LOSS: Status: RESOLVED | Noted: 2021-08-17 | Resolved: 2022-02-17

## 2022-02-17 LAB
ANION GAP SERPL CALCULATED.3IONS-SCNC: 9 MMOL/L (ref 5–18)
BUN SERPL-MCNC: 17 MG/DL (ref 8–28)
CALCIUM SERPL-MCNC: 10.1 MG/DL (ref 8.5–10.5)
CHLORIDE BLD-SCNC: 107 MMOL/L (ref 98–107)
CO2 SERPL-SCNC: 27 MMOL/L (ref 22–31)
CREAT SERPL-MCNC: 0.74 MG/DL (ref 0.6–1.1)
GFR SERPL CREATININE-BSD FRML MDRD: 81 ML/MIN/1.73M2
GLUCOSE BLD-MCNC: 94 MG/DL (ref 70–125)
POTASSIUM BLD-SCNC: 4.2 MMOL/L (ref 3.5–5)
SODIUM SERPL-SCNC: 143 MMOL/L (ref 136–145)

## 2022-02-17 PROCEDURE — 99214 OFFICE O/P EST MOD 30 MIN: CPT | Performed by: NURSE PRACTITIONER

## 2022-02-17 PROCEDURE — 36415 COLL VENOUS BLD VENIPUNCTURE: CPT | Performed by: NURSE PRACTITIONER

## 2022-02-17 PROCEDURE — 80048 BASIC METABOLIC PNL TOTAL CA: CPT | Performed by: NURSE PRACTITIONER

## 2022-02-17 NOTE — LETTER
2/17/2022    YO PALUMBO  [facility] [address] [facility] [address]    RE: Jodee Johnson       Dear Colleague,     I had the pleasure of seeing Jodee Johnosn in the [facility].      Assessment/Recommendations   Assessment:    1. Hypertension: Blood pressure today is 110/82-improved since on increased dose of losartan.  Home blood pressure systolic running in 130s to 140s.  Her home blood pressure cuff runs 10 points higher than clinic cuff.    Hypertension regimen includes  Clonidine 0.1 mg twice a day  Losartan 50 mg daily    2. Mild aortic stenosis: Most recent echocardiogram showed mild aortic stenosis.    3. Dyslipidemia: On rosuvastatin 5 mg daily.  Most recent LDL 52 in January 2022.    4.  Persistent atrial fibrillation: Recent diagnosis of atrial fibrillation in December.  Holter study showed A. fib average heart rate in 50s with 3.1-second pause noted.  Patient is currently not on any medication for rate control.    On Eliquis for stroke prevention.  Denies bleeding complications.    5.  History of left breast cancer with status post surgery: Patient plans to start radiation therapy on 2/23/2022.  She is on exemestane.    Plan/Recommendation:  -BMP pending  -Continue current hypertension regimen  -Monitor for bleeding  -Continue statin therapy    Patient's significant other, Vernon expressed that they are bit overwhelmed with her recent breast cancer surgery, upcoming radiation treatment and his appointments.     Follow up with Dr. Pena as scheduled in 2 months.  Follow-up with me as needed for now     History of Present Illness/Subjective    Ms. Jodee Johnson is a 81 year old female with a past medical history of hypertension, dyslipidemia, hemorrhagic stroke in 2015, hypothyroidism and mild aortic stenosis who is seen at [facility] for continued follow-up.    She was last seen by me in September 2021.    Patient was  seen by Dr. Pena in December.  She was found in A. fib.  Due to low blood pressure, her hydralazine was discontinued. She had a consult in atrial fibrillation clinic last month.  She was initiated on anticoagulation therapy.  She was recommended rate control strategy given she is asymptomatic.    Today, Jodee is here accompanied by her significant other, Vernon.  She denies any chest pain, shortness of breath at rest, dyspnea on exertion, PND, orthopnea, abdominal bloating, lightheadedness, dizziness, heart palpitation or lower extremity edema.  Her appetite has improved.  She has also gained back 18 pounds that she lost when I initially met her last year for follow-up.  Vernon reported that left breast surgery for breast cancer last month and now anticipating to start radiation therapy in few days.    She brought in her blood pressure readings.    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     Holter study on 1/17/2022-reviewed  HOLTER REPORT   Results:    Indication for study: Evaluate for atrial fibrillation    A 24-hour Holter monitor was applied January 17, 2022 with date of interpretation January 19, 2022    Atrial fibrillation is seen throughout the recording; the average ventricular rate is 64 bpm and heart rate ranges between  bpm    Longest pause of 3.1 seconds occurs at 6:34 AM    Rare ventricular ectopy with 51 single PVCs-no complex ventricular ectopy    Patient activity diary was not returned    Discussion    Persistent atrial fibrillation with controlled ventricular response-with a tendency towards bradycardia        ECHO done on 1/17/2022-reviewed  Interpretation Summary     The left atrium is mildly dilated.  Biplane LVEF is 60%.  Diastolic Doppler findings (E/E' ratio and/or other parameters) suggest left  ventricular filling pressures are indeterminate.  There is mild (1+) mitral regurgitation.  There is mild (1+) aortic regurgitation.  There is trace to mild tricuspid  "regurgitation.  Doppler findings do not suggest pulmonary hypertension.  The rhythm was atrial fibrillation._____________________________________________________________________________       Physical Examination Review of Systems   /82 (BP Location: Right arm, Patient Position: Sitting, Cuff Size: Adult Regular)   Pulse 58   Resp 16   Ht 1.588 m (5' 2.5\")   Wt 80.7 kg (178 lb)   BMI 32.04 kg/m    Body mass index is 32.04 kg/m .  Wt Readings from Last 3 Encounters:   02/17/22 80.7 kg (178 lb)   01/21/22 78 kg (172 lb)   12/22/21 74.8 kg (165 lb)     General Appearance:   no distress, normal body habitus   ENT/Mouth: membranes moist, no oral lesions or bleeding gums.      EYES:  no scleral icterus, normal conjunctivae   Neck: no carotid bruits or thyromegaly   Chest/Lungs:   lungs are clear to auscultation, no rales or wheezing, equal chest wall expansion    Cardiovascular:    Irregularly irregular.  Normal first and second heart sounds with no murmurs, rubs, or gallops; the carotid, radial and posterior tibial pulses are intact, Jugular venous pressure flat, no edema bilaterally    Abdomen:  no organomegaly, masses, bruits, or tenderness; bowel sounds are present   Extremities   no cyanosis or clubbing.  Radial pulses and Pedal pulses intact and symmetrical.  CMS intact.   Skin: no xanthelasma, warm.    Neurologic: normal  bilateral, no tremors     Psychiatric: alert and oriented x3, calm          Negative unless noted in HPI                                         Medical History  Surgical History Family History Social History   Past Medical History:   Diagnosis Date     Atrial fibrillation (H)      Cancer (H)     breast     Depression      Hemorrhagic cerebrovascular accident (CVA) (H)     due to HTN?     Hyperlipidemia      Hypertension      Hypothyroidism      Restless leg syndrome     Past Surgical History:   Procedure Laterality Date     LUMPECTOMY BREAST  01/20/2022    No family history on " file. Social History     Socioeconomic History     Marital status: Single     Spouse name: Not on file     Number of children: Not on file     Years of education: Not on file     Highest education level: Not on file   Occupational History     Not on file   Tobacco Use     Smoking status: Former Smoker     Packs/day: 0.00     Smokeless tobacco: Never Used   Substance and Sexual Activity     Alcohol use: Not on file     Drug use: Not on file     Sexual activity: Not on file   Other Topics Concern     Parent/sibling w/ CABG, MI or angioplasty before 65F 55M? Not Asked   Social History Narrative     Not on file     Social Determinants of Health     Financial Resource Strain: Not on file   Food Insecurity: Not on file   Transportation Needs: Not on file   Physical Activity: Not on file   Stress: Not on file   Social Connections: Not on file   Intimate Partner Violence: Not on file   Housing Stability: Not on file          Medications  Allergies   Current Outpatient Medications   Medication Sig Dispense Refill     acetaminophen (TYLENOL) 500 MG tablet Take 500-1,000 mg by mouth every 6 hours as needed for pain       apixaban ANTICOAGULANT (ELIQUIS) 5 MG tablet Take 1 tablet (5 mg) by mouth 2 times daily 60 tablet 11     busPIRone (BUSPAR) 15 MG tablet Take 15 mg by mouth 2 times daily        cholecalciferol, vitamin D3, 50 mcg (2,000 unit) capsule [CHOLECALCIFEROL, VITAMIN D3, 50 MCG (2,000 UNIT) CAPSULE] Take 1 capsule by mouth daily.       cloNIDine HCL (CATAPRES) 0.1 MG tablet Take 1 tablet by mouth 2 times daily Hold if BP <100/60       coenzyme Q10 100 mg capsule [COENZYME Q10 100 MG CAPSULE] Take 100 mg by mouth daily.       cyanocobalamin 1000 MCG tablet [CYANOCOBALAMIN 1000 MCG TABLET] Take 1,000 mcg by mouth daily.       DULoxetine (CYMBALTA) 60 MG capsule [DULOXETINE (CYMBALTA) 60 MG CAPSULE] Take 120 mg by mouth daily.       exemestane (AROMASIN) 25 MG tablet Take 25 mg by mouth daily       levothyroxine  (SYNTHROID/LEVOTHROID) 137 MCG tablet Take 68.5 mcg by mouth daily       losartan (COZAAR) 50 MG tablet Take 1 tablet (50 mg) by mouth every morning Hold if BP is <100/60 90 tablet 1     Melatonin 10 MG TABS tablet Take 10 mg by mouth At Bedtime       potassium chloride ER (KLOR-CON M) 20 MEQ CR tablet Take 20 mEq by mouth daily       rOPINIRole (REQUIP) 1 MG tablet Take 1 mg by mouth daily       rosuvastatin (CRESTOR) 5 MG tablet Take 5 mg by mouth At Bedtime        ibuprofen (ADVIL/MOTRIN) 400 MG tablet Take 400 mg by mouth every 6 hours as needed for pain (Patient not taking: Reported on 2/17/2022)      Allergies   Allergen Reactions     Atenolol Unknown     Other reaction(s): Bradycardia, Other reaction(s): Bradycardia     Hydrochlorothiazide Unknown     Other reaction(s): Hypercalcemia, Other reaction(s): Hypercalcemia     Gabapentin Other (See Comments)     Other reaction(s): Confusion, Other reaction(s): Insomnia, Unknown, insomnia, insomnia     Lisinopril Nausea     Other reaction(s): GI Upset, Other reaction(s): Abdominal Pain     Nortriptyline Unknown     Oxycodone Itching     Paroxetine Unknown     Other reaction(s): Drowsiness, Sedation, Other reaction(s): Sedation     Tetracycline Photosensitivity and Rash     Amlodipine Unknown     Other reaction(s): GI Upset, Vomiting, Other reaction(s): Abdominal Pain     Codeine Itching     Tolerates tylenol 3 at home per pt     Oxycodone-Acetaminophen Itching     Sertraline Unknown     Other reaction(s): Sedation, Other reaction(s): Sedation     Diphenhydramine Anxiety     Other reaction(s): Agitation, Other reaction(s): Unknown         Lab Results    Chemistry/lipid CBC Cardiac Enzymes/BNP/TSH/INR   No results found for: CHOL, HDL, TRIG, CREATININE, BUN, NA, CO2 No results found for: WBC, HGB, HCT, MCV, PLT No results found for: CKTOTAL, CKMB, TROPONINI, BNP, TSH, INR     33 minutes spent on the date of encounter doing chart review, review of test results,  interpretation with above tests, patient visit, documentation, discussion with other provider and discussion with family.         This note has been dictated using voice recognition software. Any grammatical, typographical, or context distortions are unintentional and inherent to the software          Thank you for allowing me to participate in the care of your patient.      Sincerely,     BETO Yusuf CNP     [facility]  cc:   BETO Weiss CNP  [address] [place] [id number]

## 2022-02-17 NOTE — PATIENT INSTRUCTIONS
Jodee Johnson,    It was a pleasure to see you today at the [facility].     My recommendations after this visit include:    - No medications changes made today    -We will follow-up with you once your lab results back    - Follow up with Dr. Pena in 2 months    - Please call CHARO Billingsley on [phone]  if you have any questions or concerns    Kat Hernandez CNP

## 2022-02-17 NOTE — PROGRESS NOTES
Assessment/Recommendations   Assessment:    1. Hypertension: Blood pressure today is 110/82-improved since on increased dose of losartan.  Home blood pressure systolic running in 130s to 140s.  Her home blood pressure cuff runs 10 points higher than clinic cuff.    Hypertension regimen includes  Clonidine 0.1 mg twice a day  Losartan 50 mg daily    2. Mild aortic stenosis: Most recent echocardiogram showed mild aortic stenosis.    3. Dyslipidemia: On rosuvastatin 5 mg daily.  Most recent LDL 52 in January 2022.    4.  Persistent atrial fibrillation: Recent diagnosis of atrial fibrillation in December.  Holter study showed A. fib average heart rate in 50s with 3.1-second pause noted.  Patient is currently not on any medication for rate control.    On Eliquis for stroke prevention.  Denies bleeding complications.    5.  History of left breast cancer with status post surgery: Patient plans to start radiation therapy on 2/23/2022.  She is on exemestane.    Plan/Recommendation:  -BMP pending  -Continue current hypertension regimen  -Monitor for bleeding  -Continue statin therapy    Patient's significant other, Vernon expressed that they are bit overwhelmed with her recent breast cancer surgery, upcoming radiation treatment and his appointments.     Follow up with Dr. Pena as scheduled in 2 months.  Follow-up with me as needed for now     History of Present Illness/Subjective    Ms. Jodee Johnson is a 81 year old female with a past medical history of hypertension, dyslipidemia, hemorrhagic stroke in 2015, hypothyroidism and mild aortic stenosis who is seen at [facility] for continued follow-up.    She was last seen by me in September 2021.    Patient was seen by Dr. Pena in December.  She was found in A. fib.  Due to low blood pressure, her hydralazine was discontinued. She had a consult in atrial fibrillation clinic last month.  She was initiated on anticoagulation therapy.   She was recommended rate control strategy given she is asymptomatic.    Today, Jodee is here accompanied by her significant other, Vernon.  She denies any chest pain, shortness of breath at rest, dyspnea on exertion, PND, orthopnea, abdominal bloating, lightheadedness, dizziness, heart palpitation or lower extremity edema.  Her appetite has improved.  She has also gained back 18 pounds that she lost when I initially met her last year for follow-up.  Vernon reported that left breast surgery for breast cancer last month and now anticipating to start radiation therapy in few days.    She brought in her blood pressure readings.    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     Holter study on 1/17/2022-reviewed  HOLTER REPORT   Results:    Indication for study: Evaluate for atrial fibrillation    A 24-hour Holter monitor was applied January 17, 2022 with date of interpretation January 19, 2022    Atrial fibrillation is seen throughout the recording; the average ventricular rate is 64 bpm and heart rate ranges between  bpm    Longest pause of 3.1 seconds occurs at 6:34 AM    Rare ventricular ectopy with 51 single PVCs-no complex ventricular ectopy    Patient activity diary was not returned    Discussion    Persistent atrial fibrillation with controlled ventricular response-with a tendency towards bradycardia        ECHO done on 1/17/2022-reviewed  Interpretation Summary     The left atrium is mildly dilated.  Biplane LVEF is 60%.  Diastolic Doppler findings (E/E' ratio and/or other parameters) suggest left  ventricular filling pressures are indeterminate.  There is mild (1+) mitral regurgitation.  There is mild (1+) aortic regurgitation.  There is trace to mild tricuspid regurgitation.  Doppler findings do not suggest pulmonary hypertension.  The rhythm was atrial fibrillation._____________________________________________________________________________       Physical Examination Review of Systems  "  /82 (BP Location: Right arm, Patient Position: Sitting, Cuff Size: Adult Regular)   Pulse 58   Resp 16   Ht 1.588 m (5' 2.5\")   Wt 80.7 kg (178 lb)   BMI 32.04 kg/m    Body mass index is 32.04 kg/m .  Wt Readings from Last 3 Encounters:   02/17/22 80.7 kg (178 lb)   01/21/22 78 kg (172 lb)   12/22/21 74.8 kg (165 lb)     General Appearance:   no distress, normal body habitus   ENT/Mouth: membranes moist, no oral lesions or bleeding gums.      EYES:  no scleral icterus, normal conjunctivae   Neck: no carotid bruits or thyromegaly   Chest/Lungs:   lungs are clear to auscultation, no rales or wheezing, equal chest wall expansion    Cardiovascular:    Irregularly irregular.  Normal first and second heart sounds with no murmurs, rubs, or gallops; the carotid, radial and posterior tibial pulses are intact, Jugular venous pressure flat, no edema bilaterally    Abdomen:  no organomegaly, masses, bruits, or tenderness; bowel sounds are present   Extremities   no cyanosis or clubbing.  Radial pulses and Pedal pulses intact and symmetrical.  CMS intact.   Skin: no xanthelasma, warm.    Neurologic: normal  bilateral, no tremors     Psychiatric: alert and oriented x3, calm          Negative unless noted in HPI                                         Medical History  Surgical History Family History Social History   Past Medical History:   Diagnosis Date     Atrial fibrillation (H)      Cancer (H)     breast     Depression      Hemorrhagic cerebrovascular accident (CVA) (H)     due to HTN?     Hyperlipidemia      Hypertension      Hypothyroidism      Restless leg syndrome     Past Surgical History:   Procedure Laterality Date     LUMPECTOMY BREAST  01/20/2022    No family history on file. Social History     Socioeconomic History     Marital status: Single     Spouse name: Not on file     Number of children: Not on file     Years of education: Not on file     Highest education level: Not on file   Occupational " History     Not on file   Tobacco Use     Smoking status: Former Smoker     Packs/day: 0.00     Smokeless tobacco: Never Used   Substance and Sexual Activity     Alcohol use: Not on file     Drug use: Not on file     Sexual activity: Not on file   Other Topics Concern     Parent/sibling w/ CABG, MI or angioplasty before 65F 55M? Not Asked   Social History Narrative     Not on file     Social Determinants of Health     Financial Resource Strain: Not on file   Food Insecurity: Not on file   Transportation Needs: Not on file   Physical Activity: Not on file   Stress: Not on file   Social Connections: Not on file   Intimate Partner Violence: Not on file   Housing Stability: Not on file          Medications  Allergies   Current Outpatient Medications   Medication Sig Dispense Refill     acetaminophen (TYLENOL) 500 MG tablet Take 500-1,000 mg by mouth every 6 hours as needed for pain       apixaban ANTICOAGULANT (ELIQUIS) 5 MG tablet Take 1 tablet (5 mg) by mouth 2 times daily 60 tablet 11     busPIRone (BUSPAR) 15 MG tablet Take 15 mg by mouth 2 times daily        cholecalciferol, vitamin D3, 50 mcg (2,000 unit) capsule [CHOLECALCIFEROL, VITAMIN D3, 50 MCG (2,000 UNIT) CAPSULE] Take 1 capsule by mouth daily.       cloNIDine HCL (CATAPRES) 0.1 MG tablet Take 1 tablet by mouth 2 times daily Hold if BP <100/60       coenzyme Q10 100 mg capsule [COENZYME Q10 100 MG CAPSULE] Take 100 mg by mouth daily.       cyanocobalamin 1000 MCG tablet [CYANOCOBALAMIN 1000 MCG TABLET] Take 1,000 mcg by mouth daily.       DULoxetine (CYMBALTA) 60 MG capsule [DULOXETINE (CYMBALTA) 60 MG CAPSULE] Take 120 mg by mouth daily.       exemestane (AROMASIN) 25 MG tablet Take 25 mg by mouth daily       levothyroxine (SYNTHROID/LEVOTHROID) 137 MCG tablet Take 68.5 mcg by mouth daily       losartan (COZAAR) 50 MG tablet Take 1 tablet (50 mg) by mouth every morning Hold if BP is <100/60 90 tablet 1     Melatonin 10 MG TABS tablet Take 10 mg by mouth At  Bedtime       potassium chloride ER (KLOR-CON M) 20 MEQ CR tablet Take 20 mEq by mouth daily       rOPINIRole (REQUIP) 1 MG tablet Take 1 mg by mouth daily       rosuvastatin (CRESTOR) 5 MG tablet Take 5 mg by mouth At Bedtime        ibuprofen (ADVIL/MOTRIN) 400 MG tablet Take 400 mg by mouth every 6 hours as needed for pain (Patient not taking: Reported on 2/17/2022)      Allergies   Allergen Reactions     Atenolol Unknown     Other reaction(s): Bradycardia, Other reaction(s): Bradycardia     Hydrochlorothiazide Unknown     Other reaction(s): Hypercalcemia, Other reaction(s): Hypercalcemia     Gabapentin Other (See Comments)     Other reaction(s): Confusion, Other reaction(s): Insomnia, Unknown, insomnia, insomnia     Lisinopril Nausea     Other reaction(s): GI Upset, Other reaction(s): Abdominal Pain     Nortriptyline Unknown     Oxycodone Itching     Paroxetine Unknown     Other reaction(s): Drowsiness, Sedation, Other reaction(s): Sedation     Tetracycline Photosensitivity and Rash     Amlodipine Unknown     Other reaction(s): GI Upset, Vomiting, Other reaction(s): Abdominal Pain     Codeine Itching     Tolerates tylenol 3 at home per pt     Oxycodone-Acetaminophen Itching     Sertraline Unknown     Other reaction(s): Sedation, Other reaction(s): Sedation     Diphenhydramine Anxiety     Other reaction(s): Agitation, Other reaction(s): Unknown         Lab Results    Chemistry/lipid CBC Cardiac Enzymes/BNP/TSH/INR   No results found for: CHOL, HDL, TRIG, CREATININE, BUN, NA, CO2 No results found for: WBC, HGB, HCT, MCV, PLT No results found for: CKTOTAL, CKMB, TROPONINI, BNP, TSH, INR     33 minutes spent on the date of encounter doing chart review, review of test results, interpretation with above tests, patient visit, documentation, discussion with other provider and discussion with family.         This note has been dictated using voice recognition software. Any grammatical, typographical, or context  distortions are unintentional and inherent to the software

## 2022-04-04 ENCOUNTER — OFFICE VISIT (OUTPATIENT)
Dept: CARDIOLOGY | Facility: CLINIC | Age: 82
End: 2022-04-04
Payer: COMMERCIAL

## 2022-04-04 VITALS
HEIGHT: 63 IN | BODY MASS INDEX: 32.25 KG/M2 | HEART RATE: 62 BPM | SYSTOLIC BLOOD PRESSURE: 124 MMHG | RESPIRATION RATE: 16 BRPM | DIASTOLIC BLOOD PRESSURE: 66 MMHG | WEIGHT: 182 LBS

## 2022-04-04 DIAGNOSIS — I35.0 AORTIC VALVE STENOSIS, ETIOLOGY OF CARDIAC VALVE DISEASE UNSPECIFIED: ICD-10-CM

## 2022-04-04 DIAGNOSIS — I10 BENIGN ESSENTIAL HYPERTENSION: Primary | ICD-10-CM

## 2022-04-04 DIAGNOSIS — I48.19 PERSISTENT ATRIAL FIBRILLATION (H): ICD-10-CM

## 2022-04-04 DIAGNOSIS — E78.5 DYSLIPIDEMIA: ICD-10-CM

## 2022-04-04 PROCEDURE — 99214 OFFICE O/P EST MOD 30 MIN: CPT | Performed by: NURSE PRACTITIONER

## 2022-04-04 NOTE — PROGRESS NOTES
Assessment/Recommendations   Assessment:    1. Hypertension: Blood pressure stable.    Hypertension regimen includes  Clonidine 0.1 mg twice a day  Losartan 50 mg daily    2. Mild aortic stenosis: Most recent echocardiogram showed mild aortic stenosis.    3. Dyslipidemia: On rosuvastatin 5 mg daily.  Most recent LDL 52 in January 2022.    4.  Persistent atrial fibrillation: Recent diagnosis of atrial fibrillation in December.  Holter study showed A. fib average heart rate in 50s with 3.1-second pause noted.  Patient is currently not on any medication for rate control.    On Eliquis for stroke prevention.  Denies bleeding complications.    5.  History of left breast cancer with status post surgery:  She is on exemestane.  Per , patient has completed 16 sessions of radiation therapy.    6.  History of depression: Patient is followed by Dr. Israel.  Last seen on 3/30/2022.  Note reviewed.  Patient reports that her depression has worsened.  She had at least 10 pound extra weight gain due to increased calorie intake.  She thinks is due to depression.  She denies any thoughts of self-harm.  She has follow-up appointment with Dr. Israel in November per her .    Plan/Recommendation:  -Continue current hypertension regimen  -Continue statin therapy  -Continue current A. fib regimen.  Monitor for bleeding.    Patient requested follow-up with me in 6 months.     Follow up with Dr. Pena as scheduled in May.     History of Present Illness/Subjective    Ms. Jodee Johnson is a 81 year old female with a past medical history of hypertension, dyslipidemia, hemorrhagic stroke in 2015, hypothyroidism and mild aortic stenosis who is seen at [facility] for continued follow-up.    During last clinic visit, patient was stable with improved appetite.  She gained weight back back to baseline.    Today, Jodee is here accompanied by her significant other, Vernon.  She denies any  "chest pain, shortness of breath at rest, dyspnea exertion, PND, orthopnea, abdominal bloating, lightheadedness, dizziness, heart palpitation or lower extremity edema.  She is sedentary.  She reports weight gain of extra 10 pounds above her baseline due to increased calorie intake.  She reports depression.  She denies suicidal thoughts.  She just followed up with her psychiatry.  She plans to follow-up again in May.    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     Holter study on 1/17/2022-reviewed  HOLTER REPORT   Results:    Indication for study: Evaluate for atrial fibrillation    A 24-hour Holter monitor was applied January 17, 2022 with date of interpretation January 19, 2022    Atrial fibrillation is seen throughout the recording; the average ventricular rate is 64 bpm and heart rate ranges between  bpm    Longest pause of 3.1 seconds occurs at 6:34 AM    Rare ventricular ectopy with 51 single PVCs-no complex ventricular ectopy    Patient activity diary was not returned    Discussion    Persistent atrial fibrillation with controlled ventricular response-with a tendency towards bradycardia    ECHO done on 1/17/2022-reviewed  Interpretation Summary   The left atrium is mildly dilated.  Biplane LVEF is 60%.  Diastolic Doppler findings (E/E' ratio and/or other parameters) suggest left  ventricular filling pressures are indeterminate.  There is mild (1+) mitral regurgitation.  There is mild (1+) aortic regurgitation.  There is trace to mild tricuspid regurgitation.  Doppler findings do not suggest pulmonary hypertension.  The rhythm was atrial fibrillation._____________________________________________________________________________       Physical Examination Review of Systems   /66 (BP Location: Right arm, Patient Position: Sitting, Cuff Size: Adult Regular)   Pulse 62   Resp 16   Ht 1.588 m (5' 2.5\")   Wt 82.6 kg (182 lb)   BMI 32.76 kg/m    Body mass index is 32.76 kg/m .  Wt " Readings from Last 3 Encounters:   04/04/22 82.6 kg (182 lb)   02/17/22 80.7 kg (178 lb)   01/21/22 78 kg (172 lb)     General Appearance:   no distress, normal body habitus   ENT/Mouth: membranes moist, no oral lesions or bleeding gums.      EYES:  no scleral icterus, normal conjunctivae   Neck: no carotid bruits or thyromegaly   Chest/Lungs:   lungs are clear to auscultation, no rales or wheezing, equal chest wall expansion    Cardiovascular:    Irregularly irregular.  Normal first and second heart sounds with no murmurs, rubs, or gallops; the carotid, radial and posterior tibial pulses are intact, Jugular venous pressure flat, no edema bilaterally    Abdomen:  no organomegaly, masses, bruits, or tenderness; bowel sounds are present   Extremities   no cyanosis or clubbing.  Radial pulses and Pedal pulses intact and symmetrical.  CMS intact.   Skin: no xanthelasma, warm.    Neurologic: normal  bilateral, no tremors     Psychiatric: alert and oriented x3, calm          Negative unless noted in HPI                                         Medical History  Surgical History Family History Social History   Past Medical History:   Diagnosis Date     Atrial fibrillation (H)      Cancer (H)     breast     Depression      Hemorrhagic cerebrovascular accident (CVA) (H)     due to HTN?     Hyperlipidemia      Hypertension      Hypotension, unspecified hypotension type 8/17/2021     Hypothyroidism      Restless leg syndrome      Weight loss 8/17/2021    Past Surgical History:   Procedure Laterality Date     LUMPECTOMY BREAST  01/20/2022    No family history on file. Social History     Socioeconomic History     Marital status: Single     Spouse name: Not on file     Number of children: Not on file     Years of education: Not on file     Highest education level: Not on file   Occupational History     Not on file   Tobacco Use     Smoking status: Former Smoker     Packs/day: 0.00     Smokeless tobacco: Never Used   Substance  and Sexual Activity     Alcohol use: Not on file     Drug use: Not on file     Sexual activity: Not on file   Other Topics Concern     Parent/sibling w/ CABG, MI or angioplasty before 65F 55M? Not Asked   Social History Narrative     Not on file     Social Determinants of Health     Financial Resource Strain: Not on file   Food Insecurity: Not on file   Transportation Needs: Not on file   Physical Activity: Not on file   Stress: Not on file   Social Connections: Not on file   Intimate Partner Violence: Not on file   Housing Stability: Not on file          Medications  Allergies   Current Outpatient Medications   Medication Sig Dispense Refill     acetaminophen (TYLENOL) 500 MG tablet Take 500-1,000 mg by mouth every 6 hours as needed for pain       apixaban ANTICOAGULANT (ELIQUIS) 5 MG tablet Take 1 tablet (5 mg) by mouth 2 times daily 60 tablet 11     busPIRone (BUSPAR) 15 MG tablet Take 15 mg by mouth 2 times daily        cholecalciferol, vitamin D3, 50 mcg (2,000 unit) capsule [CHOLECALCIFEROL, VITAMIN D3, 50 MCG (2,000 UNIT) CAPSULE] Take 1 capsule by mouth daily.       cloNIDine HCL (CATAPRES) 0.1 MG tablet Take 1 tablet by mouth 2 times daily Hold if BP <100/60       coenzyme Q10 100 mg capsule [COENZYME Q10 100 MG CAPSULE] Take 100 mg by mouth daily.       cyanocobalamin 1000 MCG tablet [CYANOCOBALAMIN 1000 MCG TABLET] Take 1,000 mcg by mouth daily.       DULoxetine (CYMBALTA) 60 MG capsule [DULOXETINE (CYMBALTA) 60 MG CAPSULE] Take 120 mg by mouth daily.       exemestane (AROMASIN) 25 MG tablet Take 25 mg by mouth daily       levothyroxine (SYNTHROID/LEVOTHROID) 137 MCG tablet Take 68.5 mcg by mouth daily       losartan (COZAAR) 50 MG tablet Take 1 tablet (50 mg) by mouth every morning Hold if BP is <100/60 90 tablet 1     Melatonin 10 MG TABS tablet Take 10 mg by mouth At Bedtime       potassium chloride ER (KLOR-CON M) 20 MEQ CR tablet Take 20 mEq by mouth daily       rOPINIRole (REQUIP) 1 MG tablet Take 1  mg by mouth daily       rosuvastatin (CRESTOR) 5 MG tablet Take 5 mg by mouth At Bedtime        ibuprofen (ADVIL/MOTRIN) 400 MG tablet Take 400 mg by mouth every 6 hours as needed for pain  (Patient not taking: Reported on 4/4/2022)      Allergies   Allergen Reactions     Atenolol Unknown     Other reaction(s): Bradycardia, Other reaction(s): Bradycardia     Hydrochlorothiazide Unknown     Other reaction(s): Hypercalcemia, Other reaction(s): Hypercalcemia     Gabapentin Other (See Comments)     Other reaction(s): Confusion, Other reaction(s): Insomnia, Unknown, insomnia, insomnia     Lisinopril Nausea     Other reaction(s): GI Upset, Other reaction(s): Abdominal Pain     Nortriptyline Unknown     Oxycodone Itching     Paroxetine Unknown     Other reaction(s): Drowsiness, Sedation, Other reaction(s): Sedation     Tetracycline Photosensitivity and Rash     Amlodipine Unknown     Other reaction(s): GI Upset, Vomiting, Other reaction(s): Abdominal Pain     Codeine Itching     Tolerates tylenol 3 at home per pt     Oxycodone-Acetaminophen Itching     Sertraline Unknown     Other reaction(s): Sedation, Other reaction(s): Sedation     Diphenhydramine Anxiety     Other reaction(s): Agitation, Other reaction(s): Unknown         Lab Results    Chemistry/lipid CBC Cardiac Enzymes/BNP/TSH/INR   Lab Results   Component Value Date    BUN 17 02/17/2022     02/17/2022    CO2 27 02/17/2022    No results found for: WBC, HGB, HCT, MCV, PLT No results found for: CKTOTAL, CKMB, TROPONINI, BNP, TSH, INR     17 minutes spent on the date of encounter doing chart review, review of test results, patient visit, documentation and discussion with family.         This note has been dictated using voice recognition software. Any grammatical, typographical, or context distortions are unintentional and inherent to the software

## 2022-04-04 NOTE — LETTER
4/4/2022    YO PALUMBO  [facility] [address] [facility] [address]    RE: Jodee Johnson       Dear Colleague,     I had the pleasure of seeing Jodee Johnson in the [facility].      Assessment/Recommendations   Assessment:    1. Hypertension: Blood pressure stable.    Hypertension regimen includes  Clonidine 0.1 mg twice a day  Losartan 50 mg daily    2. Mild aortic stenosis: Most recent echocardiogram showed mild aortic stenosis.    3. Dyslipidemia: On rosuvastatin 5 mg daily.  Most recent LDL 52 in January 2022.    4.  Persistent atrial fibrillation: Recent diagnosis of atrial fibrillation in December.  Holter study showed A. fib average heart rate in 50s with 3.1-second pause noted.  Patient is currently not on any medication for rate control.    On Eliquis for stroke prevention.  Denies bleeding complications.    5.  History of left breast cancer with status post surgery:  She is on exemestane.  Per , patient has completed 16 sessions of radiation therapy.    6.  History of depression: Patient is followed by Dr. Israel.  Last seen on 3/30/2022.  Note reviewed.  Patient reports that her depression has worsened.  She had at least 10 pound extra weight gain due to increased calorie intake.  She thinks is due to depression.  She denies any thoughts of self-harm.  She has follow-up appointment with Dr. Israel in November per her .    Plan/Recommendation:  -Continue current hypertension regimen  -Continue statin therapy  -Continue current A. fib regimen.  Monitor for bleeding.    Patient requested follow-up with me in 6 months.     Follow up with Dr. Pena as scheduled in May.     History of Present Illness/Subjective    Ms. Jodee Johnson is a 81 year old female with a past medical history of hypertension, dyslipidemia, hemorrhagic stroke in 2015, hypothyroidism and mild aortic stenosis who is seen at [facility] for  continued follow-up.    During last clinic visit, patient was stable with improved appetite.  She gained weight back back to baseline.    Today, Jodee is here accompanied by her significant other, Vernon.  She denies any chest pain, shortness of breath at rest, dyspnea exertion, PND, orthopnea, abdominal bloating, lightheadedness, dizziness, heart palpitation or lower extremity edema.  She is sedentary.  She reports weight gain of extra 10 pounds above her baseline due to increased calorie intake.  She reports depression.  She denies suicidal thoughts.  She just followed up with her psychiatry.  She plans to follow-up again in May.    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     Holter study on 1/17/2022-reviewed  HOLTER REPORT   Results:    Indication for study: Evaluate for atrial fibrillation    A 24-hour Holter monitor was applied January 17, 2022 with date of interpretation January 19, 2022    Atrial fibrillation is seen throughout the recording; the average ventricular rate is 64 bpm and heart rate ranges between  bpm    Longest pause of 3.1 seconds occurs at 6:34 AM    Rare ventricular ectopy with 51 single PVCs-no complex ventricular ectopy    Patient activity diary was not returned    Discussion    Persistent atrial fibrillation with controlled ventricular response-with a tendency towards bradycardia    ECHO done on 1/17/2022-reviewed  Interpretation Summary   The left atrium is mildly dilated.  Biplane LVEF is 60%.  Diastolic Doppler findings (E/E' ratio and/or other parameters) suggest left  ventricular filling pressures are indeterminate.  There is mild (1+) mitral regurgitation.  There is mild (1+) aortic regurgitation.  There is trace to mild tricuspid regurgitation.  Doppler findings do not suggest pulmonary hypertension.  The rhythm was atrial fibrillation._____________________________________________________________________________       Physical Examination Review of Systems  "  /66 (BP Location: Right arm, Patient Position: Sitting, Cuff Size: Adult Regular)   Pulse 62   Resp 16   Ht 1.588 m (5' 2.5\")   Wt 82.6 kg (182 lb)   BMI 32.76 kg/m    Body mass index is 32.76 kg/m .  Wt Readings from Last 3 Encounters:   04/04/22 82.6 kg (182 lb)   02/17/22 80.7 kg (178 lb)   01/21/22 78 kg (172 lb)     General Appearance:   no distress, normal body habitus   ENT/Mouth: membranes moist, no oral lesions or bleeding gums.      EYES:  no scleral icterus, normal conjunctivae   Neck: no carotid bruits or thyromegaly   Chest/Lungs:   lungs are clear to auscultation, no rales or wheezing, equal chest wall expansion    Cardiovascular:    Irregularly irregular.  Normal first and second heart sounds with no murmurs, rubs, or gallops; the carotid, radial and posterior tibial pulses are intact, Jugular venous pressure flat, no edema bilaterally    Abdomen:  no organomegaly, masses, bruits, or tenderness; bowel sounds are present   Extremities   no cyanosis or clubbing.  Radial pulses and Pedal pulses intact and symmetrical.  CMS intact.   Skin: no xanthelasma, warm.    Neurologic: normal  bilateral, no tremors     Psychiatric: alert and oriented x3, calm          Negative unless noted in HPI                                         Medical History  Surgical History Family History Social History   Past Medical History:   Diagnosis Date     Atrial fibrillation (H)      Cancer (H)     breast     Depression      Hemorrhagic cerebrovascular accident (CVA) (H)     due to HTN?     Hyperlipidemia      Hypertension      Hypotension, unspecified hypotension type 8/17/2021     Hypothyroidism      Restless leg syndrome      Weight loss 8/17/2021    Past Surgical History:   Procedure Laterality Date     LUMPECTOMY BREAST  01/20/2022    No family history on file. Social History     Socioeconomic History     Marital status: Single     Spouse name: Not on file     Number of children: Not on file     Years of " education: Not on file     Highest education level: Not on file   Occupational History     Not on file   Tobacco Use     Smoking status: Former Smoker     Packs/day: 0.00     Smokeless tobacco: Never Used   Substance and Sexual Activity     Alcohol use: Not on file     Drug use: Not on file     Sexual activity: Not on file   Other Topics Concern     Parent/sibling w/ CABG, MI or angioplasty before 65F 55M? Not Asked   Social History Narrative     Not on file     Social Determinants of Health     Financial Resource Strain: Not on file   Food Insecurity: Not on file   Transportation Needs: Not on file   Physical Activity: Not on file   Stress: Not on file   Social Connections: Not on file   Intimate Partner Violence: Not on file   Housing Stability: Not on file          Medications  Allergies   Current Outpatient Medications   Medication Sig Dispense Refill     acetaminophen (TYLENOL) 500 MG tablet Take 500-1,000 mg by mouth every 6 hours as needed for pain       apixaban ANTICOAGULANT (ELIQUIS) 5 MG tablet Take 1 tablet (5 mg) by mouth 2 times daily 60 tablet 11     busPIRone (BUSPAR) 15 MG tablet Take 15 mg by mouth 2 times daily        cholecalciferol, vitamin D3, 50 mcg (2,000 unit) capsule [CHOLECALCIFEROL, VITAMIN D3, 50 MCG (2,000 UNIT) CAPSULE] Take 1 capsule by mouth daily.       cloNIDine HCL (CATAPRES) 0.1 MG tablet Take 1 tablet by mouth 2 times daily Hold if BP <100/60       coenzyme Q10 100 mg capsule [COENZYME Q10 100 MG CAPSULE] Take 100 mg by mouth daily.       cyanocobalamin 1000 MCG tablet [CYANOCOBALAMIN 1000 MCG TABLET] Take 1,000 mcg by mouth daily.       DULoxetine (CYMBALTA) 60 MG capsule [DULOXETINE (CYMBALTA) 60 MG CAPSULE] Take 120 mg by mouth daily.       exemestane (AROMASIN) 25 MG tablet Take 25 mg by mouth daily       levothyroxine (SYNTHROID/LEVOTHROID) 137 MCG tablet Take 68.5 mcg by mouth daily       losartan (COZAAR) 50 MG tablet Take 1 tablet (50 mg) by mouth every morning Hold if  BP is <100/60 90 tablet 1     Melatonin 10 MG TABS tablet Take 10 mg by mouth At Bedtime       potassium chloride ER (KLOR-CON M) 20 MEQ CR tablet Take 20 mEq by mouth daily       rOPINIRole (REQUIP) 1 MG tablet Take 1 mg by mouth daily       rosuvastatin (CRESTOR) 5 MG tablet Take 5 mg by mouth At Bedtime        ibuprofen (ADVIL/MOTRIN) 400 MG tablet Take 400 mg by mouth every 6 hours as needed for pain  (Patient not taking: Reported on 4/4/2022)      Allergies   Allergen Reactions     Atenolol Unknown     Other reaction(s): Bradycardia, Other reaction(s): Bradycardia     Hydrochlorothiazide Unknown     Other reaction(s): Hypercalcemia, Other reaction(s): Hypercalcemia     Gabapentin Other (See Comments)     Other reaction(s): Confusion, Other reaction(s): Insomnia, Unknown, insomnia, insomnia     Lisinopril Nausea     Other reaction(s): GI Upset, Other reaction(s): Abdominal Pain     Nortriptyline Unknown     Oxycodone Itching     Paroxetine Unknown     Other reaction(s): Drowsiness, Sedation, Other reaction(s): Sedation     Tetracycline Photosensitivity and Rash     Amlodipine Unknown     Other reaction(s): GI Upset, Vomiting, Other reaction(s): Abdominal Pain     Codeine Itching     Tolerates tylenol 3 at home per pt     Oxycodone-Acetaminophen Itching     Sertraline Unknown     Other reaction(s): Sedation, Other reaction(s): Sedation     Diphenhydramine Anxiety     Other reaction(s): Agitation, Other reaction(s): Unknown         Lab Results    Chemistry/lipid CBC Cardiac Enzymes/BNP/TSH/INR   Lab Results   Component Value Date    BUN 17 02/17/2022     02/17/2022    CO2 27 02/17/2022    No results found for: WBC, HGB, HCT, MCV, PLT No results found for: CKTOTAL, CKMB, TROPONINI, BNP, TSH, INR     17 minutes spent on the date of encounter doing chart review, review of test results, patient visit, documentation and discussion with family.         This note has been dictated using voice recognition software.  Any grammatical, typographical, or context distortions are unintentional and inherent to the software          Thank you for allowing me to participate in the care of your patient.      Sincerely,     BETO Yusuf [facility]  cc:   Audie Pena MD  [address] [place] [id number]

## 2022-04-04 NOTE — PATIENT INSTRUCTIONS
Jodee Johnson,    It was a pleasure to see you today at the [facility].     My recommendations after this visit include:    - No medications changes made today    - Follow up with Kat in 6 months or sooner if needed     - Follow up with Dr. Pena as scheduled in May    - Follow up with Dr. Johnson as planned in May    - Please call CHARO Billingsley on [phone]  if you have any questions or concerns    Kat Hernandez, CNP

## 2022-05-13 ENCOUNTER — OFFICE VISIT (OUTPATIENT)
Dept: CARDIOLOGY | Facility: CLINIC | Age: 82
End: 2022-05-13
Payer: COMMERCIAL

## 2022-05-13 VITALS
HEART RATE: 58 BPM | DIASTOLIC BLOOD PRESSURE: 60 MMHG | BODY MASS INDEX: 33.48 KG/M2 | SYSTOLIC BLOOD PRESSURE: 100 MMHG | RESPIRATION RATE: 16 BRPM | WEIGHT: 186 LBS

## 2022-05-13 DIAGNOSIS — I10 BENIGN ESSENTIAL HYPERTENSION: ICD-10-CM

## 2022-05-13 DIAGNOSIS — I48.91 ATRIAL FIBRILLATION, UNSPECIFIED TYPE (H): ICD-10-CM

## 2022-05-13 PROBLEM — I35.0 AORTIC STENOSIS: Status: RESOLVED | Noted: 2021-03-26 | Resolved: 2022-05-13

## 2022-05-13 PROCEDURE — 99214 OFFICE O/P EST MOD 30 MIN: CPT | Performed by: INTERNAL MEDICINE

## 2022-05-13 PROCEDURE — 93000 ELECTROCARDIOGRAM COMPLETE: CPT | Performed by: INTERNAL MEDICINE

## 2022-05-13 RX ORDER — HYDRALAZINE HYDROCHLORIDE 25 MG/1
12.5 TABLET, FILM COATED ORAL 2 TIMES DAILY
COMMUNITY
End: 2022-12-19

## 2022-05-13 RX ORDER — METHYLPHENIDATE HYDROCHLORIDE 10 MG/1
10 TABLET ORAL DAILY
COMMUNITY
Start: 2022-05-11 | End: 2022-12-19

## 2022-05-13 NOTE — PATIENT INSTRUCTIONS
Please call my nurse Kristy with an update on what heart  medicines you need refilled.Her number is [phone].  Please let me know if you have questions or are interested in pursuing the watchman device.

## 2022-05-13 NOTE — PROGRESS NOTES
HEART CARE ENCOUNTER CONSULTATON NOTE      [facility]  [phone]      Assessment/Recommendations   Assessment/Plan:  1.  Persistent atrial fibrillation recently diagnosed in December.  Subsequent Holter monitor demonstrated atrial fibrillation with an average heart rate of 64 bpm with some trend towards mild bradycardia and rare ventricular ectopy.  She was seen by my colleagues in the atrial fibrillation clinic and given lack of symptoms and increased risk of medications for rhythm control in the setting of bradycardia the decision was to pursue a rate control strategy.  We did discuss this today and she is in agreement that she is feeling well as a relates to the atrial fibrillation and wishes to continue with current medication plan.  We did discuss the risk of stroke versus the risk of bleeding.  I did give her and her significant other a pamphlet as a relates to watchman.  I did renew the Eliquis prescription.  EKG today demonstrates sinus rhythm.  Therefore she appears to have paroxysmal atrial fibrillation but given her lack of symptoms and rate control would recommend continuing with current strategy.    2.  Hypertension.  Blood pressure appears to be under good control with the current combination of medicines that include losartan, clonidine and low-dose hydralazine.  Recent echocardiogram revealed normal systolic function with an ejection fraction of 60% and no significant valve abnormality.  The underlying rhythm was atrial fibrillation.  No significant aortic stenosis was noted.    3.  History of depression.  Patient followed by psychiatry.  Medications are reviewed.    4.  Dyslipidemia on rosuvastatin.  The most recent lipid panel is from January 2022 at which time total cholesterol was 140, triglycerides 122, LDL 52.  AST and ALT were normal September 2021.    EKG today his heart rate is regular.  Follow-up 6 months.  Patient will review the pamphlet as a relates to the  watchman device and update me with any questions or concerns  We talked about the increased risk of bleeding and avoiding nonsteroidal anti-inflammatory medications.       History of Present Illness/Subjective    HPI: Jodee Sy is a 81 year old female who is seen in follow-up.  She has a history of hypertension, dyslipidemia, hemorrhagic stroke , hypothyroidism and recent diagnosis during my previous visit of atrial fibrillation that has been asymptomatic.  She has had no specific symptoms related to atrial fibrillation specifically denying chest pain, shortness of breath, dizziness or lightheadedness.  She has been sedentary but has no specific complaints.  After diagnosing atrial fibrillation I discussed with her neurologist and primary care provider about the pluses and minuses of anticoagulation and she has been on Eliquis 5 mg twice daily and tolerating.  She denies falling or bleeding episodes and we did discuss the risk of bleeding versus the risk of stroke as well as alternatives including the watchman device today.  I did provide a pamphlet as a relates to the watchman device.    Recent Echocardiogram Results:  Reading Physician Reading Date Result Priority   Dianna Mayer MD  [phone] 2022      Narrative & Impression  [id number]  [id number]  [id number]  [id number]^MIGUEL A^MARIA ALEJANDRA^[place] [address]     Name: JODEE SY  MRN: [id number]  : 1940  Study Date: 2022 10:11 AM  Age: 81 yrs  Gender: Female  Patient Location: [facility]  Reason For Study: Palpitations  Ordering Physician: MARIA ALEJANDRA GRADY  Referring Physician: MARIA ALEJANDRA GRADY  Performed By: AT [facility]     BSA: 1.8 m2  Height: 63 in  Weight: 165 lb  HR: 94  BP: 125/69 mmHg  ______________________________________________________________________________  Procedure  Complete Echo Adult. Definity (NDC #59033-938) given intravenously. 2 ml  lot #  [id number].  ______________________________________________________________________________  Interpretation Summary     The left atrium is mildly dilated.  Biplane LVEF is 60%.  Diastolic Doppler findings (E/E' ratio and/or other parameters) suggest left  ventricular filling pressures are indeterminate.  There is mild (1+) mitral regurgitation.  There is mild (1+) aortic regurgitation.  There is trace to mild tricuspid regurgitation.  Doppler findings do not suggest pulmonary hypertension.  The rhythm was atrial fibrillation.  ______________________________________________________________________________  Left Ventricle  The left ventricle is normal in size. There is mild concentric left  ventricular hypertrophy. Diastolic Doppler findings (E/E' ratio and/or other  parameters) suggest left ventricular filling pressures are indeterminate.  Biplane LVEF is 60%. No regional wall motion abnormalities noted.     Right Ventricle  The right ventricle is borderline dilated. TAPSE is normal, which is  consistent with normal right ventricular systolic function.     Atria  The left atrium is mildly dilated. Right atrial size is normal. Intact atrial  septum.     Mitral Valve  There is mild mitral annular calcification. There is mild (1+) mitral  regurgitation.     Tricuspid Valve  Tricuspid valve leaflets appear normal. There is trace to mild tricuspid  regurgitation. Doppler findings do not suggest pulmonary hypertension.     Aortic Valve  There is mild trileaflet aortic sclerosis. There is mild (1+) aortic  regurgitation. No hemodynamically significant valvular aortic stenosis.     Pulmonic Valve  Normal pulmonic valve. There is trace pulmonic valvular regurgitation.     Vessels  The aorta root is normal. Normal size ascending aorta. IVC diameter <2.1 cm  collapsing >50% with sniff suggests a normal RA pressure of 3 mmHg.     Pericardium  There is no pericardial effusion.     Rhythm  The rhythm was atrial  fibrillation.  ______________________________________________________________________________          Indications    Atrial fibrillation, unspecified type (H) [I48.91 (ICD-10-CM)]                     ECG Link     Holter Monitor Scan - Scan on 1/19/22 8:30 AM by       Scans on Order [id number]    Scan on 1/19/2022  8:30 AM        Conclusion    [facility]     HOLTER REPORT     Results:    Indication for study: Evaluate for atrial fibrillation    A 24-hour Holter monitor was applied January 17, 2022 with date of interpretation January 19, 2022    Atrial fibrillation is seen throughout the recording; the average ventricular rate is 64 bpm and heart rate ranges between  bpm    Longest pause of 3.1 seconds occurs at 6:34 AM    Rare ventricular ectopy with 51 single PVCs-no complex ventricular ectopy    Patient activity diary was not returned    Discussion    Persistent atrial fibrillation with controlled ventricular response-with a tendency towards bradycardia     ECG sinus bradycardia 54 bpm otherwise normal-appearing EKG.     Physical Examination  Review of Systems   Vitals: 100/60, weight 186 pounds, heart rate of 58 and regular today.  Wt Readings from Last 3 Encounters:   04/04/22 82.6 kg (182 lb)   02/17/22 80.7 kg (178 lb)   01/21/22 78 kg (172 lb)       General Appearance:   no distress, normal body habitus   ENT/Mouth:  Facemask in place   EYES:  no scleral icterus, normal conjunctivae   Neck: no carotid bruits or thyromegaly   Chest/Lungs:   lungs are clear to auscultation, no rales or wheezing, , equal chest wall expansion    Cardiovascular:   Regular. Normal first and second heart sounds with soft systolic murmurs, rubs, or gallops; the carotid, radial and posterior tibial pulses are intact, Jugular venous pressure does not appear elevated, no significant edema bilaterally    Abdomen:  no organomegaly, masses, bruits, or tenderness; bowel sounds are present   Extremities: no cyanosis or  clubbing   Skin: no xanthelasma, warm.    Neurologic: , no tremors     Psychiatric: alert and oriented x3, calm        Please refer above for cardiac ROS details.        Medical History  Surgical History Family History Social History   Past Medical History:   Diagnosis Date     Atrial fibrillation (H)      Cancer (H)     breast     Depression      Hemorrhagic cerebrovascular accident (CVA) (H)     due to HTN?     Hyperlipidemia      Hypertension      Hypotension, unspecified hypotension type 8/17/2021     Hypothyroidism      Restless leg syndrome      Weight loss 8/17/2021     Past Surgical History:   Procedure Laterality Date     LUMPECTOMY BREAST  01/20/2022     No family history on file.     Social History     Socioeconomic History     Marital status: Single     Spouse name: Not on file     Number of children: Not on file     Years of education: Not on file     Highest education level: Not on file   Occupational History     Not on file   Tobacco Use     Smoking status: Former Smoker     Packs/day: 0.00     Smokeless tobacco: Never Used   Substance and Sexual Activity     Alcohol use: Not on file     Drug use: Not on file     Sexual activity: Not on file   Other Topics Concern     Parent/sibling w/ CABG, MI or angioplasty before 65F 55M? Not Asked   Social History Narrative     Not on file     Social Determinants of Health     Financial Resource Strain: Not on file   Food Insecurity: Not on file   Transportation Needs: Not on file   Physical Activity: Not on file   Stress: Not on file   Social Connections: Not on file   Intimate Partner Violence: Not on file   Housing Stability: Not on file           Medications  Allergies   Current Outpatient Medications   Medication Sig Dispense Refill     acetaminophen (TYLENOL) 500 MG tablet Take 500-1,000 mg by mouth every 6 hours as needed for pain       apixaban ANTICOAGULANT (ELIQUIS) 5 MG tablet Take 1 tablet (5 mg) by mouth 2 times daily 60 tablet 11     busPIRone  (BUSPAR) 15 MG tablet Take 15 mg by mouth 2 times daily        cholecalciferol, vitamin D3, 50 mcg (2,000 unit) capsule [CHOLECALCIFEROL, VITAMIN D3, 50 MCG (2,000 UNIT) CAPSULE] Take 1 capsule by mouth daily.       cloNIDine HCL (CATAPRES) 0.1 MG tablet Take 1 tablet by mouth 2 times daily Hold if BP <100/60       coenzyme Q10 100 mg capsule [COENZYME Q10 100 MG CAPSULE] Take 100 mg by mouth daily.       cyanocobalamin 1000 MCG tablet [CYANOCOBALAMIN 1000 MCG TABLET] Take 1,000 mcg by mouth daily.       DULoxetine (CYMBALTA) 60 MG capsule [DULOXETINE (CYMBALTA) 60 MG CAPSULE] Take 120 mg by mouth daily.       exemestane (AROMASIN) 25 MG tablet Take 25 mg by mouth daily       ibuprofen (ADVIL/MOTRIN) 400 MG tablet Take 400 mg by mouth every 6 hours as needed for pain  (Patient not taking: Reported on 4/4/2022)       levothyroxine (SYNTHROID/LEVOTHROID) 137 MCG tablet Take 68.5 mcg by mouth daily       losartan (COZAAR) 50 MG tablet Take 1 tablet (50 mg) by mouth every morning Hold if BP is <100/60 90 tablet 1     Melatonin 10 MG TABS tablet Take 10 mg by mouth At Bedtime       potassium chloride ER (KLOR-CON M) 20 MEQ CR tablet Take 20 mEq by mouth daily       rOPINIRole (REQUIP) 1 MG tablet Take 1 mg by mouth daily       rosuvastatin (CRESTOR) 5 MG tablet Take 5 mg by mouth At Bedtime          Allergies   Allergen Reactions     Atenolol Unknown     Other reaction(s): Bradycardia, Other reaction(s): Bradycardia     Hydrochlorothiazide Unknown     Other reaction(s): Hypercalcemia, Other reaction(s): Hypercalcemia     Gabapentin Other (See Comments)     Other reaction(s): Confusion, Other reaction(s): Insomnia, Unknown, insomnia, insomnia     Lisinopril Nausea     Other reaction(s): GI Upset, Other reaction(s): Abdominal Pain     Nortriptyline Unknown     Oxycodone Itching     Paroxetine Unknown     Other reaction(s): Drowsiness, Sedation, Other reaction(s): Sedation     Tetracycline Photosensitivity and Rash      Amlodipine Unknown     Other reaction(s): GI Upset, Vomiting, Other reaction(s): Abdominal Pain     Codeine Itching     Tolerates tylenol 3 at home per pt     Oxycodone-Acetaminophen Itching     Sertraline Unknown     Other reaction(s): Sedation, Other reaction(s): Sedation     Diphenhydramine Anxiety     Other reaction(s): Agitation, Other reaction(s): Unknown          Lab Results    Chemistry/lipid CBC Cardiac Enzymes/BNP/TSH/INR   No results for input(s): CHOL, HDL, LDL, TRIG, CHOLHDLRATIO in the last 89533 hours.  No results for input(s): LDL in the last 89599 hours.  Recent Labs   Lab Test 02/17/22  1408      POTASSIUM 4.2   CHLORIDE 107   CO2 27   GLC 94   BUN 17   CR 0.74   GFRESTIMATED 81   DANTE 10.1     Recent Labs   Lab Test 02/17/22  1408   CR 0.74     No results for input(s): A1C in the last 32711 hours.       No results for input(s): WBC, HGB, HCT, MCV, PLT in the last 83846 hours.  No results for input(s): HGB in the last 75460 hours. No results for input(s): TROPONINI in the last 99031 hours.  No results for input(s): BNP, NTBNPI, NTBNP in the last 52144 hours.  No results for input(s): TSH in the last 31729 hours.  No results for input(s): INR in the last 18338 hours.     Audie Pena MD

## 2022-05-16 LAB
ATRIAL RATE - MUSE: 54 BPM
DIASTOLIC BLOOD PRESSURE - MUSE: NORMAL MMHG
INTERPRETATION ECG - MUSE: NORMAL
P AXIS - MUSE: 33 DEGREES
PR INTERVAL - MUSE: 204 MS
QRS DURATION - MUSE: 76 MS
QT - MUSE: 398 MS
QTC - MUSE: 377 MS
R AXIS - MUSE: -2 DEGREES
SYSTOLIC BLOOD PRESSURE - MUSE: NORMAL MMHG
T AXIS - MUSE: 18 DEGREES
VENTRICULAR RATE- MUSE: 54 BPM

## 2022-09-13 ENCOUNTER — TRANSFERRED RECORDS (OUTPATIENT)
Dept: HEALTH INFORMATION MANAGEMENT | Facility: CLINIC | Age: 82
End: 2022-09-13

## 2022-10-21 ENCOUNTER — TRANSFERRED RECORDS (OUTPATIENT)
Dept: HEALTH INFORMATION MANAGEMENT | Facility: CLINIC | Age: 82
End: 2022-10-21

## 2022-10-26 ENCOUNTER — TRANSFERRED RECORDS (OUTPATIENT)
Dept: HEALTH INFORMATION MANAGEMENT | Facility: CLINIC | Age: 82
End: 2022-10-26

## 2022-12-19 ENCOUNTER — OFFICE VISIT (OUTPATIENT)
Dept: CARDIOLOGY | Facility: CLINIC | Age: 82
End: 2022-12-19
Payer: COMMERCIAL

## 2022-12-19 VITALS
SYSTOLIC BLOOD PRESSURE: 110 MMHG | HEART RATE: 109 BPM | DIASTOLIC BLOOD PRESSURE: 80 MMHG | BODY MASS INDEX: 32.43 KG/M2 | WEIGHT: 183 LBS | RESPIRATION RATE: 16 BRPM | HEIGHT: 63 IN

## 2022-12-19 DIAGNOSIS — I48.0 PAROXYSMAL ATRIAL FIBRILLATION (H): Primary | ICD-10-CM

## 2022-12-19 PROCEDURE — 99214 OFFICE O/P EST MOD 30 MIN: CPT | Performed by: INTERNAL MEDICINE

## 2022-12-19 PROCEDURE — 93000 ELECTROCARDIOGRAM COMPLETE: CPT | Performed by: INTERNAL MEDICINE

## 2022-12-19 RX ORDER — DIVALPROEX SODIUM 250 MG/1
250 TABLET, DELAYED RELEASE ORAL 2 TIMES DAILY
COMMUNITY
Start: 2022-10-14

## 2022-12-19 RX ORDER — MEMANTINE HYDROCHLORIDE 5 MG/1
2.5 TABLET ORAL 2 TIMES DAILY
COMMUNITY
Start: 2022-12-07

## 2022-12-19 RX ORDER — TRAZODONE HYDROCHLORIDE 50 MG/1
50 TABLET, FILM COATED ORAL AT BEDTIME
COMMUNITY
Start: 2022-12-05

## 2022-12-19 RX ORDER — ARIPIPRAZOLE 5 MG/1
5 TABLET ORAL EVERY MORNING
COMMUNITY
Start: 2022-12-14

## 2022-12-19 RX ORDER — LOSARTAN POTASSIUM 25 MG/1
25 TABLET ORAL DAILY
COMMUNITY
Start: 2022-11-07 | End: 2022-12-21 | Stop reason: DRUGHIGH

## 2022-12-19 RX ORDER — DULOXETIN HYDROCHLORIDE 20 MG/1
40 CAPSULE, DELAYED RELEASE ORAL DAILY
COMMUNITY
Start: 2022-12-15 | End: 2022-12-21 | Stop reason: DRUGHIGH

## 2022-12-19 RX ORDER — MELATONIN 10 MG
10 CAPSULE ORAL
COMMUNITY

## 2022-12-19 NOTE — PATIENT INSTRUCTIONS
We are going to plan a 2 week monitor to further evaluate the atrial fibrillation and slowing of the heart beat.Please call with any significant dizziness,shortness of breath or other heart concerns.My nurse is Kristy and her number is [phone].Please review the watchman pamphlet and call with questions.I will be in touch with results.  We are going to plan to monitor and I will be in touch with results as well.

## 2022-12-19 NOTE — LETTER
12/19/2022    YO PALUMBO  [facility] [address] [address] [facility] [address]    RE: Jodee Johnson       Dear Colleague,     I had the pleasure of seeing [facility] in the [facility].    HEART CARE ENCOUNTER CONSULTATON NOTE      [facility]  [phone]      Assessment/Recommendations   Assessment/Plan:  1.  Persistent atrial fibrillation.  She has periods of bradycardia and sinus rhythm and periods of atrial fibrillation.  EKG today demonstrates atrial fibrillation with a heart rate of 91 bpm, nonspecific ST-T changes in the inferior lateral leads.  She is not describing any symptoms of chest pain or significant shortness of breath.  I have indicated that it would be helpful for her to wear a 2-week monitor so that we can define the recurrence of atrial fibrillation frequency and the rate either in sinus or in atrial fibrillation.  She is in agreement and I taken the liberty of scheduling this in the near future.  We talked about the increased risks of bleeding.  She does have a shuffling gait and although has not fallen she does appear to be at somewhat increased risk of falling.  I did give him again a pamphlet to review the watchman device and asked her  to be back in touch with us once they have had a chance to review and ask questions.  ECG from May 2022 demonstrates sinus bradycardia without the ST-T segment changes.    2.  Nonspecific ST-T changes.  No specific complaints of anginal chest discomfort.  Echocardiogram from January 2022 revealed normal systolic function.  She has not had recent stress testing.  We will plan to discuss possible stress test further pending the above.    3.  Hypertension.  Blood pressure appears to be under good control with the current combination of medicines.  Blood pressure today 110/80.    4.  History of depression.  Patient was admitted to hospital for a number of days in October per chart record and  review.  She does follow with her primary care provider and psychology and psychiatry.  Recent notes are reviewed.  She did have an MRI of her head in October that reported no acute intracranial process.  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    5.  Lipids from October revealed a cholesterol total 123 and an LDL cholesterol 55.    1.  Event monitor  2.  Discussed the watchman device and the risk of bleeding with respect to falling, they are aware to avoid nonsteroidal anti-inflammatory medications and to review the pamphlet and update me with any questions.  Patient does have Excedrin listed as one of her medications but states that she does not take this.         History of Present Illness/Subjective    .  Subjective    HPI: Jodee Johnson is a 81 year old female who is seen in follow-up.  She has a history of hypertension, dyslipidemia, hemorrhagic stroke 2015, hypothyroidism and recent diagnosis during my previous visit of atrial fibrillation that has been asymptomatic.  She has had no specific symptoms related to atrial fibrillation specifically denying chest pain, shortness of breath, dizziness or lightheadedness.  She has been sedentary but has no specific complaints.  After diagnosing atrial fibrillation I discussed with her neurologist and primary care provider about the pluses and minuses of anticoagulation and she has been on Eliquis 5 mg twice daily and tolerating.    I have reviewed chart records including notes from her primary care physician as well as urgent care.  She was seen in urgent care [facility] with concerns regarding palpitations.  Reportedly had a heart rate of 44 bpm and the home health nurse recommended that she be evaluated.  She came to urgent care the next day.  On that EKG her heart rate was 63 bpm with some nonspecific ST-T changes.  She has fatigue but no syncope or near syncope.  She does have a shuffling gait and has not fallen although appears to be at  higher risk of falling.  I reviewed this as a relates to the current treatment with Eliquis.                                  [facility]                                                                                   Test Date:    2022-10-21   Pat Name:     TOBY SY             Department:   [facility]   Patient ID:   [id number]                  Room:           Gender:       F                        Technician:     :          1940               Requested By: JT GRIER MD   Order Number: [id number]                Reading MD:   AMY Rubalcava                                    Measurements   Intervals                              Axis             Rate:         63                       P:            71   NE:           190                      QRS:          15   QRSD:         85                       T:            33   QT:           381                                       QTc:          392                                                                  Interpretive Statements   SINUS RHYTHM WITH FREQUENT SUPRAVENTRICULAR PREMATURE COMPLEXES   MODERATE ST DEPRESSION [0.05+ mV ST DEPRESSION]   Compared to ECG 2022 10:43:30   ST (T wave) deviation now present   Atrial fibrillation no longer present   T-wave abnormality no longer present   Electronically Signed On 10- 14:45:49 CDT by AMY Rubalcava   Resulting Agency [facility]         Recent Echocardiogram Results:  Echocardiogram Complete  Order: [id number]   Status: Edited Result - FINAL      Visible to patient: No (inaccessible in MyChart)      Next appt: 2022 at 02:50 PM in Cardiology (Audie Pena MD)      Dx: Palpitations      4 Result Notes     1 Follow-up Encounter  Details    Reading Physician Reading Date Result Priority   Dianna Mayer MD  [phone] 2022      Narrative & Impression  [id number]  [id number]  [id number]  [id number]^MIGUEL A^AUDIE^[place] [address]      Name: TOBY SY  MRN: [id number]  : 1940  Study Date: 2022 10:11 AM  Age: 81 yrs  Gender: Female  Patient Location: [facility]  Reason For Study: Palpitations  Ordering Physician: MARIA ALEJANDRA GRADY  Referring Physician: MARIA ALEJANDRA GRADY  Performed By: AT [facility]     BSA: 1.8 m2  Height: 63 in  Weight: 165 lb  HR: 94  BP: 125/69 mmHg  ______________________________________________________________________________  Procedure  Complete Echo Adult. Definity (NDC #91876-898) given intravenously. 2 ml  lot # [id number].  ______________________________________________________________________________  Interpretation Summary     The left atrium is mildly dilated.  Biplane LVEF is 60%.  Diastolic Doppler findings (E/E' ratio and/or other parameters) suggest left  ventricular filling pressures are indeterminate.  There is mild (1+) mitral regurgitation.  There is mild (1+) aortic regurgitation.  There is trace to mild tricuspid regurgitation.  Doppler findings do not suggest pulmonary hypertension.  The rhythm was atrial fibrillation.  ______________________________________________________________________________  Left Ventricle  The left ventricle is normal in size. There is mild concentric left  ventricular hypertrophy. Diastolic Doppler findings (E/E' ratio and/or other  parameters) suggest left ventricular filling pressures are indeterminate.  Biplane LVEF is 60%. No regional wall motion abnormalities noted.     Right Ventricle  The right ventricle is borderline dilated. TAPSE is normal, which is  consistent with normal right ventricular systolic function.     Atria  The left atrium is mildly dilated. Right atrial size is normal. Intact atrial  septum.     Mitral Valve  There is mild mitral annular calcification. There is mild (1+) mitral  regurgitation.     Tricuspid Valve  Tricuspid valve leaflets appear normal. There is trace to mild tricuspid  regurgitation. Doppler findings do not suggest pulmonary  hypertension.     Aortic Valve  There is mild trileaflet aortic sclerosis. There is mild (1+) aortic  regurgitation. No hemodynamically significant valvular aortic stenosis.     Pulmonic Valve  Normal pulmonic valve. There is trace pulmonic valvular regurgitation.     Vessels  The aorta root is normal. Normal size ascending aorta. IVC diameter <2.1 cm  collapsing >50% with sniff suggests a normal RA pressure of 3 mmHg.     Pericardium  There is no pericardial effusion.     Rhythm  The rhythm was atrial fibrillation.  ______________________________________________________________________________     Scan on 1/19/2022  8:30 AM        Conclusion    [facility]     HOLTER REPORT     Results:    Indication for study: Evaluate for atrial fibrillation    A 24-hour Holter monitor was applied January 17, 2022 with date of interpretation January 19, 2022    Atrial fibrillation is seen throughout the recording; the average ventricular rate is 64 bpm and heart rate ranges between  bpm    Longest pause of 3.1 seconds occurs at 6:34 AM    Rare ventricular ectopy with 51 single PVCs-no complex ventricular ectopy    Patient activity diary was not returned    Discussion    Persistent atrial fibrillation with controlled ventricular response-with a tendency towards bradycardia            Recent Coronary Angiogram Results:         Physical Examination  Review of Systems   Vitals: 110/80, heart rate of  and the regular, weight 183 pounds  Wt Readings from Last 3 Encounters:   05/13/22 84.4 kg (186 lb)   04/04/22 82.6 kg (182 lb)   02/17/22 80.7 kg (178 lb)       General Appearance:   no distress, normal body habitus, diminished affect   ENT/Mouth:  Wearing a facemask   EYES:  no scleral icterus, normal conjunctivae   Neck: no carotid bruits or thyromegaly   Chest/Lungs:   lungs are clear to auscultation, no rales or wheezing, equal chest wall expansion    Cardiovascular:    Regular, distant, soft systolic murmur  normal first and second heart sounds with no rubs, or gallops; the carotid, radial and posterior tibial pulses are intact, Jugular venous pressure difficult to assess as she had difficulty getting into the examining table not obviously increased, no significant edema bilaterally    Abdomen:  no , bruits, or tenderness; bowel sounds are present   Extremities: no cyanosis or clubbing   Skin: no xanthelasma, warm.    Neurologic: , no tremors     Psychiatric: alert and oriented x3, calm        Please refer above for cardiac ROS details.        Medical History  Surgical History Family History Social History   Past Medical History:   Diagnosis Date     Atrial fibrillation (H)      Cancer (H)     breast     Depression      Hemorrhagic cerebrovascular accident (CVA) (H)     due to HTN?     Hyperlipidemia      Hypertension      Hypotension, unspecified hypotension type 8/17/2021     Hypothyroidism      Restless leg syndrome      Weight loss 8/17/2021     Past Surgical History:   Procedure Laterality Date     LUMPECTOMY BREAST  01/20/2022     No family history on file.     Social History     Socioeconomic History     Marital status: Single     Spouse name: Not on file     Number of children: Not on file     Years of education: Not on file     Highest education level: Not on file   Occupational History     Not on file   Tobacco Use     Smoking status: Former     Packs/day: 0.00     Types: Cigarettes     Smokeless tobacco: Never   Substance and Sexual Activity     Alcohol use: Not on file     Drug use: Not on file     Sexual activity: Not on file   Other Topics Concern     Parent/sibling w/ CABG, MI or angioplasty before 65F 55M? Not Asked   Social History Narrative     Not on file     Social Determinants of Health     Financial Resource Strain: Not on file   Food Insecurity: Not on file   Transportation Needs: Not on file   Physical Activity: Not on file   Stress: Not on file   Social Connections: Not on file   Intimate  Partner Violence: Not on file   Housing Stability: Not on file           Medications  Allergies   Current Outpatient Medications   Medication Sig Dispense Refill     acetaminophen (TYLENOL) 500 MG tablet Take 500-1,000 mg by mouth every 6 hours as needed for pain       apixaban ANTICOAGULANT (ELIQUIS) 5 MG tablet Take 1 tablet (5 mg) by mouth 2 times daily 180 tablet 4     busPIRone (BUSPAR) 15 MG tablet Take 15 mg by mouth 2 times daily        cholecalciferol, vitamin D3, 50 mcg (2,000 unit) capsule [CHOLECALCIFEROL, VITAMIN D3, 50 MCG (2,000 UNIT) CAPSULE] Take 1 capsule by mouth daily.       cloNIDine HCL (CATAPRES) 0.1 MG tablet Take 1 tablet by mouth 2 times daily Hold if BP <100/60       coenzyme Q10 100 mg capsule [COENZYME Q10 100 MG CAPSULE] Take 100 mg by mouth daily.       cyanocobalamin 1000 MCG tablet [CYANOCOBALAMIN 1000 MCG TABLET] Take 1,000 mcg by mouth daily.       DULoxetine (CYMBALTA) 60 MG capsule [DULOXETINE (CYMBALTA) 60 MG CAPSULE] Take 120 mg by mouth daily.       exemestane (AROMASIN) 25 MG tablet Take 25 mg by mouth daily       hydrALAZINE (APRESOLINE) 25 MG tablet Take 12.5 mg by mouth 2 times daily       levothyroxine (SYNTHROID/LEVOTHROID) 137 MCG tablet Take 68.5 mcg by mouth daily       losartan (COZAAR) 50 MG tablet TAKE 1 TABLET BY MOUTH EVERY MORNING HOLD IF BLOOD PRESSURE IS<100/60 90 tablet 1     methylphenidate (RITALIN) 10 MG tablet Take 10 mg by mouth daily (Patient not taking: Reported on 5/13/2022)       potassium chloride ER (KLOR-CON M) 20 MEQ CR tablet Take 20 mEq by mouth daily       rOPINIRole (REQUIP) 1 MG tablet Take 1 mg by mouth as needed       rosuvastatin (CRESTOR) 5 MG tablet Take 5 mg by mouth At Bedtime          Allergies   Allergen Reactions     Atenolol Unknown     Other reaction(s): Bradycardia, Other reaction(s): Bradycardia     Hydrochlorothiazide Unknown     Other reaction(s): Hypercalcemia, Other reaction(s): Hypercalcemia     Gabapentin Other (See  Comments)     Other reaction(s): Confusion, Other reaction(s): Insomnia, Unknown, insomnia, insomnia     Lisinopril Nausea     Other reaction(s): GI Upset, Other reaction(s): Abdominal Pain     Nortriptyline Unknown     Oxycodone Itching     Paroxetine Unknown     Other reaction(s): Drowsiness, Sedation, Other reaction(s): Sedation     Tetracycline Photosensitivity and Rash     Amlodipine Unknown     Other reaction(s): GI Upset, Vomiting, Other reaction(s): Abdominal Pain     Codeine Itching     Tolerates tylenol 3 at home per pt     Oxycodone-Acetaminophen Itching     Sertraline Unknown     Other reaction(s): Sedation, Other reaction(s): Sedation     Diphenhydramine Anxiety     Other reaction(s): Agitation, Other reaction(s): Unknown          Lab Results    Chemistry/lipid CBC Cardiac Enzymes/BNP/TSH/INR   No results for input(s): CHOL, HDL, LDL, TRIG, CHOLHDLRATIO in the last 84116 hours.  No results for input(s): LDL in the last 78164 hours.  Recent Labs   Lab Test 02/17/22  1408      POTASSIUM 4.2   CHLORIDE 107   CO2 27   GLC 94   BUN 17   CR 0.74   GFRESTIMATED 81   DANTE 10.1     Recent Labs   Lab Test 02/17/22  1408   CR 0.74     No results for input(s): A1C in the last 84648 hours.       No results for input(s): WBC, HGB, HCT, MCV, PLT in the last 01047 hours.  No results for input(s): HGB in the last 65355 hours. No results for input(s): TROPONINI in the last 26611 hours.  No results for input(s): BNP, NTBNPI, NTBNP in the last 80776 hours.  No results for input(s): TSH in the last 77306 hours.  No results for input(s): INR in the last 49653 hours.     Audie Pena MD  Thank you for allowing me to participate in the care of your patient.      Sincerely,     Audie Pena MD     [facility]  cc:   No referring provider defined for this encounter.

## 2022-12-19 NOTE — PROGRESS NOTES
HEART CARE ENCOUNTER CONSULTATON NOTE      [facility]  [phone]      Assessment/Recommendations   Assessment/Plan:  1.  Persistent atrial fibrillation.  She has periods of bradycardia and sinus rhythm and periods of atrial fibrillation.  EKG today demonstrates atrial fibrillation with a heart rate of 91 bpm, nonspecific ST-T changes in the inferior lateral leads.  She is not describing any symptoms of chest pain or significant shortness of breath.  I have indicated that it would be helpful for her to wear a 2-week monitor so that we can define the recurrence of atrial fibrillation frequency and the rate either in sinus or in atrial fibrillation.  She is in agreement and I taken the liberty of scheduling this in the near future.  We talked about the increased risks of bleeding.  She does have a shuffling gait and although has not fallen she does appear to be at somewhat increased risk of falling.  I did give him again a pamphlet to review the watchman device and asked her  to be back in touch with us once they have had a chance to review and ask questions.  ECG from May 2022 demonstrates sinus bradycardia without the ST-T segment changes.    2.  Nonspecific but diffuse and prominent ST-T changes which are new when compared to May 2022.  No specific complaints of anginal chest discomfort.  Echocardiogram from January 2022 revealed normal systolic function.  She has not had recent stress testing.  We will plan to discuss possible stress test further pending the above.    3.  Hypertension.  Blood pressure appears to be under good control with the current combination of medicines.  Blood pressure today 110/80.    4.  History of depression.  Patient was admitted to hospital for a number of days in October per chart record and review.  She does follow with her primary care provider and psychology and psychiatry.  Recent notes are reviewed.  She did have an MRI of her head in October that  reported no acute intracranial process.  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    5.  Lipids from October revealed a cholesterol total 123 and an LDL cholesterol 55.    1.  Event monitor  2.  Discussed the watchman device and the risk of bleeding with respect to falling, they are aware to avoid nonsteroidal anti-inflammatory medications and to review the pamphlet and update me with any questions.  Patient does have Excedrin listed as one of her medications but states that she does not take this.         History of Present Illness/Subjective    .  Subjective    HPI: Jodee Johnson is a 81 year old female who is seen in follow-up.  She has a history of hypertension, dyslipidemia, hemorrhagic stroke 2015, hypothyroidism and recent diagnosis during my previous visit of atrial fibrillation that has been asymptomatic.  She has had no specific symptoms related to atrial fibrillation specifically denying chest pain, shortness of breath, dizziness or lightheadedness.  She has been sedentary but has no specific complaints.  After diagnosing atrial fibrillation I discussed with her neurologist and primary care provider about the pluses and minuses of anticoagulation and she has been on Eliquis 5 mg twice daily and tolerating.  Her  indicates that she is sedentary and does have some breathlessness with activity.    I have reviewed chart records including notes from her primary care physician as well as urgent care.  She was seen in urgent care October 21 with concerns regarding palpitations.  Reportedly had a heart rate of 44 bpm and the home health nurse recommended that she be evaluated.  She came to urgent care the next day.  On that EKG her heart rate was 63 bpm with some nonspecific ST-T changes.  She has fatigue but no syncope or near syncope.  She does have a shuffling gait and has not fallen although appears to be at higher risk of falling.  I reviewed this as a relates to the current  treatment with Eliquthalia Souza [facility]                                                                                   Test Date:    2022-10-21   Pat Name:     TOBY SY             Department:   [facility]   Patient ID:   [id number]                  Room:           Gender:       F                        Technician:     :          1940               Requested By: JT GRIER MD   Order Number: [id number]                Reading MD:   AMY Rubalcava                                    Measurements   Intervals                              Axis             Rate:         63                       P:            71   MS:           190                      QRS:          15   QRSD:         85                       T:            33   QT:           381                                       QTc:          392                                                                  Interpretive Statements   SINUS RHYTHM WITH FREQUENT SUPRAVENTRICULAR PREMATURE COMPLEXES   MODERATE ST DEPRESSION [0.05+ mV ST DEPRESSION]   Compared to ECG 2022 10:43:30   ST (T wave) deviation now present   Atrial fibrillation no longer present   T-wave abnormality no longer present   Electronically Signed On 10- 14:45:49 CDT by AMY Rubalcava   Resulting Agency [facility]         Recent Echocardiogram Results:  Echocardiogram Complete  Order: [id number]   Status: Edited Result - FINAL      Visible to patient: No (inaccessible in MyChart)      Next appt: 2022 at 02:50 PM in Cardiology (Audie Pena MD)      Dx: Palpitations      4 Result Notes     1 Follow-up Encounter  Details    Reading Physician Reading Date Result Priority   Dianna Mayer MD  [phone] 2022      Narrative & Impression  [id number]  [id number]  [id number]  [id number]^MIGUEL A^AUDIE^[place] [address]     Name: TOBY SY  MRN: [id number]  : 1940  Study Date:  01/17/2022 10:11 AM  Age: 81 yrs  Gender: Female  Patient Location: On license of [facility]  Reason For Study: Palpitations  Ordering Physician: MARIA ALEJANDRA GRADY  Referring Physician: MARIA ALEJANDRA GRADY  Performed By: AT [facility]     BSA: 1.8 m2  Height: 63 in  Weight: 165 lb  HR: 94  BP: 125/69 mmHg  ______________________________________________________________________________  Procedure  Complete Echo Adult. Definity (NDC #82678-433) given intravenously. 2 ml  lot # [id number].  ______________________________________________________________________________  Interpretation Summary     The left atrium is mildly dilated.  Biplane LVEF is 60%.  Diastolic Doppler findings (E/E' ratio and/or other parameters) suggest left  ventricular filling pressures are indeterminate.  There is mild (1+) mitral regurgitation.  There is mild (1+) aortic regurgitation.  There is trace to mild tricuspid regurgitation.  Doppler findings do not suggest pulmonary hypertension.  The rhythm was atrial fibrillation.  ______________________________________________________________________________  Left Ventricle  The left ventricle is normal in size. There is mild concentric left  ventricular hypertrophy. Diastolic Doppler findings (E/E' ratio and/or other  parameters) suggest left ventricular filling pressures are indeterminate.  Biplane LVEF is 60%. No regional wall motion abnormalities noted.     Right Ventricle  The right ventricle is borderline dilated. TAPSE is normal, which is  consistent with normal right ventricular systolic function.     Atria  The left atrium is mildly dilated. Right atrial size is normal. Intact atrial  septum.     Mitral Valve  There is mild mitral annular calcification. There is mild (1+) mitral  regurgitation.     Tricuspid Valve  Tricuspid valve leaflets appear normal. There is trace to mild tricuspid  regurgitation. Doppler findings do not suggest pulmonary hypertension.     Aortic Valve  There is mild trileaflet aortic sclerosis. There is  mild (1+) aortic  regurgitation. No hemodynamically significant valvular aortic stenosis.     Pulmonic Valve  Normal pulmonic valve. There is trace pulmonic valvular regurgitation.     Vessels  The aorta root is normal. Normal size ascending aorta. IVC diameter <2.1 cm  collapsing >50% with sniff suggests a normal RA pressure of 3 mmHg.     Pericardium  There is no pericardial effusion.     Rhythm  The rhythm was atrial fibrillation.  ______________________________________________________________________________     Scan on 1/19/2022  8:30 AM        Conclusion    [facility]     HOLTER REPORT     Results:    Indication for study: Evaluate for atrial fibrillation    A 24-hour Holter monitor was applied January 17, 2022 with date of interpretation January 19, 2022    Atrial fibrillation is seen throughout the recording; the average ventricular rate is 64 bpm and heart rate ranges between  bpm    Longest pause of 3.1 seconds occurs at 6:34 AM    Rare ventricular ectopy with 51 single PVCs-no complex ventricular ectopy    Patient activity diary was not returned    Discussion    Persistent atrial fibrillation with controlled ventricular response-with a tendency towards bradycardia            Recent Coronary Angiogram Results:         Physical Examination  Review of Systems   Vitals: 110/80, heart rate of  and the regular, weight 183 pounds  Wt Readings from Last 3 Encounters:   05/13/22 84.4 kg (186 lb)   04/04/22 82.6 kg (182 lb)   02/17/22 80.7 kg (178 lb)       General Appearance:   no distress, normal body habitus, diminished affect   ENT/Mouth:  Wearing a facemask   EYES:  no scleral icterus, normal conjunctivae   Neck: no carotid bruits or thyromegaly   Chest/Lungs:   lungs are clear to auscultation, no rales or wheezing, equal chest wall expansion    Cardiovascular:    Regular, distant, soft systolic murmur normal first and second heart sounds with no rubs, or gallops; the carotid, radial and  posterior tibial pulses are intact, Jugular venous pressure difficult to assess as she had difficulty getting into the examining table not obviously increased, no significant edema bilaterally    Abdomen:  no , bruits, or tenderness; bowel sounds are present   Extremities: no cyanosis or clubbing   Skin: no xanthelasma, warm.    Neurologic: , no tremors     Psychiatric: alert and oriented x3, calm        Please refer above for cardiac ROS details.        Medical History  Surgical History Family History Social History   Past Medical History:   Diagnosis Date     Atrial fibrillation (H)      Cancer (H)     breast     Depression      Hemorrhagic cerebrovascular accident (CVA) (H)     due to HTN?     Hyperlipidemia      Hypertension      Hypotension, unspecified hypotension type 8/17/2021     Hypothyroidism      Restless leg syndrome      Weight loss 8/17/2021     Past Surgical History:   Procedure Laterality Date     LUMPECTOMY BREAST  01/20/2022     No family history on file.     Social History     Socioeconomic History     Marital status: Single     Spouse name: Not on file     Number of children: Not on file     Years of education: Not on file     Highest education level: Not on file   Occupational History     Not on file   Tobacco Use     Smoking status: Former     Packs/day: 0.00     Types: Cigarettes     Smokeless tobacco: Never   Substance and Sexual Activity     Alcohol use: Not on file     Drug use: Not on file     Sexual activity: Not on file   Other Topics Concern     Parent/sibling w/ CABG, MI or angioplasty before 65F 55M? Not Asked   Social History Narrative     Not on file     Social Determinants of Health     Financial Resource Strain: Not on file   Food Insecurity: Not on file   Transportation Needs: Not on file   Physical Activity: Not on file   Stress: Not on file   Social Connections: Not on file   Intimate Partner Violence: Not on file   Housing Stability: Not on file           Medications   Allergies   Current Outpatient Medications   Medication Sig Dispense Refill     acetaminophen (TYLENOL) 500 MG tablet Take 500-1,000 mg by mouth every 6 hours as needed for pain       apixaban ANTICOAGULANT (ELIQUIS) 5 MG tablet Take 1 tablet (5 mg) by mouth 2 times daily 180 tablet 4     busPIRone (BUSPAR) 15 MG tablet Take 15 mg by mouth 2 times daily        cholecalciferol, vitamin D3, 50 mcg (2,000 unit) capsule [CHOLECALCIFEROL, VITAMIN D3, 50 MCG (2,000 UNIT) CAPSULE] Take 1 capsule by mouth daily.       cloNIDine HCL (CATAPRES) 0.1 MG tablet Take 1 tablet by mouth 2 times daily Hold if BP <100/60       coenzyme Q10 100 mg capsule [COENZYME Q10 100 MG CAPSULE] Take 100 mg by mouth daily.       cyanocobalamin 1000 MCG tablet [CYANOCOBALAMIN 1000 MCG TABLET] Take 1,000 mcg by mouth daily.       DULoxetine (CYMBALTA) 60 MG capsule [DULOXETINE (CYMBALTA) 60 MG CAPSULE] Take 120 mg by mouth daily.       exemestane (AROMASIN) 25 MG tablet Take 25 mg by mouth daily       hydrALAZINE (APRESOLINE) 25 MG tablet Take 12.5 mg by mouth 2 times daily       levothyroxine (SYNTHROID/LEVOTHROID) 137 MCG tablet Take 68.5 mcg by mouth daily       losartan (COZAAR) 50 MG tablet TAKE 1 TABLET BY MOUTH EVERY MORNING HOLD IF BLOOD PRESSURE IS<100/60 90 tablet 1     methylphenidate (RITALIN) 10 MG tablet Take 10 mg by mouth daily (Patient not taking: Reported on 5/13/2022)       potassium chloride ER (KLOR-CON M) 20 MEQ CR tablet Take 20 mEq by mouth daily       rOPINIRole (REQUIP) 1 MG tablet Take 1 mg by mouth as needed       rosuvastatin (CRESTOR) 5 MG tablet Take 5 mg by mouth At Bedtime          Allergies   Allergen Reactions     Atenolol Unknown     Other reaction(s): Bradycardia, Other reaction(s): Bradycardia     Hydrochlorothiazide Unknown     Other reaction(s): Hypercalcemia, Other reaction(s): Hypercalcemia     Gabapentin Other (See Comments)     Other reaction(s): Confusion, Other reaction(s): Insomnia, Unknown, insomnia,  insomnia     Lisinopril Nausea     Other reaction(s): GI Upset, Other reaction(s): Abdominal Pain     Nortriptyline Unknown     Oxycodone Itching     Paroxetine Unknown     Other reaction(s): Drowsiness, Sedation, Other reaction(s): Sedation     Tetracycline Photosensitivity and Rash     Amlodipine Unknown     Other reaction(s): GI Upset, Vomiting, Other reaction(s): Abdominal Pain     Codeine Itching     Tolerates tylenol 3 at home per pt     Oxycodone-Acetaminophen Itching     Sertraline Unknown     Other reaction(s): Sedation, Other reaction(s): Sedation     Diphenhydramine Anxiety     Other reaction(s): Agitation, Other reaction(s): Unknown          Lab Results    Chemistry/lipid CBC Cardiac Enzymes/BNP/TSH/INR   No results for input(s): CHOL, HDL, LDL, TRIG, CHOLHDLRATIO in the last 01023 hours.  No results for input(s): LDL in the last 68849 hours.  Recent Labs   Lab Test 02/17/22  1408      POTASSIUM 4.2   CHLORIDE 107   CO2 27   GLC 94   BUN 17   CR 0.74   GFRESTIMATED 81   DANTE 10.1     Recent Labs   Lab Test 02/17/22  1408   CR 0.74     No results for input(s): A1C in the last 22345 hours.       No results for input(s): WBC, HGB, HCT, MCV, PLT in the last 57077 hours.  No results for input(s): HGB in the last 91892 hours. No results for input(s): TROPONINI in the last 72100 hours.  No results for input(s): BNP, NTBNPI, NTBNP in the last 94142 hours.  No results for input(s): TSH in the last 92262 hours.  No results for input(s): INR in the last 40191 hours.     Audie Pena MD

## 2022-12-20 LAB
ATRIAL RATE - MUSE: 468 BPM
DIASTOLIC BLOOD PRESSURE - MUSE: NORMAL MMHG
INTERPRETATION ECG - MUSE: NORMAL
P AXIS - MUSE: NORMAL DEGREES
PR INTERVAL - MUSE: NORMAL MS
QRS DURATION - MUSE: 76 MS
QT - MUSE: 258 MS
QTC - MUSE: 317 MS
R AXIS - MUSE: -7 DEGREES
SYSTOLIC BLOOD PRESSURE - MUSE: NORMAL MMHG
T AXIS - MUSE: 186 DEGREES
VENTRICULAR RATE- MUSE: 91 BPM

## 2022-12-21 DIAGNOSIS — R94.31 NONSPECIFIC ST-T WAVE ELECTROCARDIOGRAPHIC CHANGES: Primary | ICD-10-CM

## 2022-12-21 RX ORDER — LOSARTAN POTASSIUM 50 MG/1
25 TABLET ORAL DAILY
COMMUNITY
End: 2023-02-13

## 2022-12-21 RX ORDER — DULOXETINE 40 MG/1
40 CAPSULE, DELAYED RELEASE ORAL DAILY
COMMUNITY

## 2022-12-21 NOTE — PROGRESS NOTES
Nuclear stress ordered.  -rah    ===View-only below this line===  ----- Message -----  From: Audie Pena MD  Sent: 12/20/2022   6:11 PM CST  To: Kristy Lomeli RN    Discussed with patient's .  He was supposed to schedule an event monitor when they left yesterday but this was not completed I called him and also suggested that we do a Lexiscan nuclear stress test due to some EKG changes.  Can we please arrange both of these at the same time in the near future.  Thank you mdg

## 2022-12-27 ENCOUNTER — TELEPHONE (OUTPATIENT)
Dept: CARDIOLOGY | Facility: CLINIC | Age: 82
End: 2022-12-27

## 2022-12-27 NOTE — TELEPHONE ENCOUNTER
----- Message from Ayoanalydaria TOÑO Staley sent at 2022  9:29 AM CST -----  Regarding: Denied NM EKG Lexiscan - Peer to Peer requested  Peer to Peer Request    Patient Name:  Jodee Johnson  :    1940  MRN:    [id number]        Dr. Pena,    The authorization for procedure NM EKG LEXISCAN 45 SEQ  on date of service 22 has been denied. We were unsuccessful in obtaining approval through clinical review. A [id number] review can be done by calling the insurance/third party authorization vendor with the following information:    Insurance: [employer] [place]  Auth Vendor:  [employer]  Phone:  [phone]  Due:  ASAP before 22    Clinicals already Provided  Order:      Visit Notes:     Results:    ECG  Other:    N/A    Patient ID: [id number]  Case/Ref #: [id number]    Patient contact: No  Patient response:  N/A  Patient Phone #: [phone]    Denial Reason: It must be needed for one of the following reasons.  -To check to see what effect the outcome of your chest pain will have on your heart.  -Unclear results on your electrocardiogram or ECG (a tracing of your heart's actions)  related to your chest pain.  -Unclear ECG changes that are caused by medications.  -Unclear ECG changes that are caused by a problem where there is a delay or blockage  along the pathway that impulses travel to make your heart beat (bundle branch block).  -Unclear ECG changes caused by the left lower heart chamber being too large (left  ventricular hypertrophy)  -To check the vessels that supply your heart with blood when your vessels were abnormal at birth.  -To check your risk of disease or recheck known disease when you have no signs or  stables signs of sickness, you have known narrowing of the vessels of the heart on prior  heart studies but have not had a repair to restore the blood flow to your heart within the  past two years        It must be needed for one of the following reasons.  -To see if you have  coronary artery disease or CAD (a disease of the blood vessels that  provide blood to your heart muscle) without chest pain because you have a new onset of  heart failure (when your heart muscle does not pump as well as it should).  -To see if you have CAD without chest pain because you have a new onset of decreased  left lower heart chamber function.  -To determine if your disease of the heart muscle is the type that causes decreased blood flow to your heart when there is no plan for you to have a heart catheterization (a test where your doctor guides a hollow tube through your blood vessel and to your heart to take live pictures of your heart).  -To see how well your heart is working before or after a procedure to improve the blood  flow.  -To measure and assess the amount of dead tissue in and around your heart after a heart attack in an attempt to guess the course your disease may take.       If you feel you have additional clinical information that could get this denial overturned, please complete the Peer to Peer.    If you choose not to do the P2P, please let me know as soon as possible so that we can reach out to the patient and advise them of their denial.      Thank you,    [facility]

## 2022-12-27 NOTE — TELEPHONE ENCOUNTER
Discussed with Vernon, they will reschedule nuclear stress test at this time.    Dr Pena - did you get a chance to call the peer to peer?  -sammi   Right femoral vein. Accessed successfully. Femoral access needle used. Using ultrasound guidance.

## 2022-12-29 NOTE — TELEPHONE ENCOUNTER
Pt rescheduled to 1/5/23.  -ra    ===View-only below this line===  ----- Message -----  From: Audie Pena MD  Sent: 12/28/2022   4:07 PM CST  To: Kristy Lomeli RN, Kathy Staley  Subject: RE: Denied NM EKG Lexiscan - Peer to Peer re*    The authorization number is [id number] JAZZY Pena

## 2023-01-03 ENCOUNTER — TELEPHONE (OUTPATIENT)
Dept: CARDIOLOGY | Facility: CLINIC | Age: 83
End: 2023-01-03

## 2023-01-03 NOTE — TELEPHONE ENCOUNTER
Vernon called to find out if insurance is covering event monitor since they weren't going to cover stress test.  Informed Vernon that both tests are now covered, and both appts were moved to 1/5/23.  Vernon verbalized understanding and had no additional questions.  -sammi

## 2023-01-05 ENCOUNTER — HOSPITAL ENCOUNTER (OUTPATIENT)
Dept: CARDIOLOGY | Facility: HOSPITAL | Age: 83
Discharge: HOME | End: 2023-01-05
Attending: INTERNAL MEDICINE
Payer: COMMERCIAL

## 2023-01-05 ENCOUNTER — HOSPITAL ENCOUNTER (OUTPATIENT)
Dept: NUCLEAR MEDICINE | Facility: HOSPITAL | Age: 83
Discharge: HOME | End: 2023-01-05
Attending: INTERNAL MEDICINE
Payer: COMMERCIAL

## 2023-01-05 DIAGNOSIS — I48.0 PAROXYSMAL ATRIAL FIBRILLATION (H): ICD-10-CM

## 2023-01-05 DIAGNOSIS — R94.31 NONSPECIFIC ST-T WAVE ELECTROCARDIOGRAPHIC CHANGES: ICD-10-CM

## 2023-01-05 LAB
CV BLOOD PRESSURE: 65 MMHG
CV STRESS CURRENT BP HE: NORMAL
CV STRESS CURRENT HR HE: 58
CV STRESS CURRENT HR HE: 63
CV STRESS CURRENT HR HE: 66
CV STRESS CURRENT HR HE: 68
CV STRESS CURRENT HR HE: 68
CV STRESS CURRENT HR HE: 69
CV STRESS CURRENT HR HE: 71
CV STRESS CURRENT HR HE: 72
CV STRESS CURRENT HR HE: 72
CV STRESS CURRENT HR HE: 75
CV STRESS CURRENT HR HE: 75
CV STRESS CURRENT HR HE: 76
CV STRESS CURRENT HR HE: 85
CV STRESS CURRENT HR HE: 87
CV STRESS DEVIATION TIME HE: NORMAL
CV STRESS ECHO PERCENT HR HE: NORMAL
CV STRESS EXERCISE STAGE HE: NORMAL
CV STRESS FINAL RESTING BP HE: NORMAL
CV STRESS FINAL RESTING HR HE: 85
CV STRESS MAX HR HE: 87
CV STRESS MAX TREADMILL GRADE HE: 0
CV STRESS MAX TREADMILL SPEED HE: 0
CV STRESS PEAK DIA BP HE: NORMAL
CV STRESS PEAK SYS BP HE: NORMAL
CV STRESS PHASE HE: NORMAL
CV STRESS PROTOCOL HE: NORMAL
CV STRESS RESTING PT POSITION HE: NORMAL
CV STRESS ST DEVIATION AMOUNT HE: NORMAL
CV STRESS ST DEVIATION ELEVATION HE: NORMAL
CV STRESS ST EVELATION AMOUNT HE: NORMAL
CV STRESS TEST TYPE HE: NORMAL
CV STRESS TOTAL STAGE TIME MIN 1 HE: NORMAL
RATE PRESSURE PRODUCT: NORMAL
STRESS ECHO BASELINE DIASTOLIC HE: 82
STRESS ECHO BASELINE HR: 64
STRESS ECHO BASELINE SYSTOLIC BP: 121
STRESS ECHO CALCULATED PERCENT HR: 63 %
STRESS ECHO LAST STRESS DIASTOLIC BP: 87
STRESS ECHO LAST STRESS HR: 75
STRESS ECHO LAST STRESS SYSTOLIC BP: 156
STRESS ECHO TARGET HR: 138

## 2023-01-05 PROCEDURE — A9500 TC99M SESTAMIBI: HCPCS | Performed by: INTERNAL MEDICINE

## 2023-01-05 PROCEDURE — 93018 CV STRESS TEST I&R ONLY: CPT | Performed by: INTERNAL MEDICINE

## 2023-01-05 PROCEDURE — 93016 CV STRESS TEST SUPVJ ONLY: CPT | Performed by: INTERNAL MEDICINE

## 2023-01-05 PROCEDURE — 93017 CV STRESS TEST TRACING ONLY: CPT

## 2023-01-05 PROCEDURE — 78452 HT MUSCLE IMAGE SPECT MULT: CPT | Mod: 26 | Performed by: INTERNAL MEDICINE

## 2023-01-05 PROCEDURE — 999N000096 CARDIAC MOBILE TELEMETRY MONITOR

## 2023-01-05 PROCEDURE — 250N000011 HC RX IP 250 OP 636: Performed by: INTERNAL MEDICINE

## 2023-01-05 PROCEDURE — 343N000001 HC RX 343: Performed by: INTERNAL MEDICINE

## 2023-01-05 PROCEDURE — 78452 HT MUSCLE IMAGE SPECT MULT: CPT

## 2023-01-05 RX ORDER — ALBUTEROL SULFATE 0.83 MG/ML
2.5 SOLUTION RESPIRATORY (INHALATION)
Status: DISCONTINUED | OUTPATIENT
Start: 2023-01-05 | End: 2023-01-05 | Stop reason: HOSPADM

## 2023-01-05 RX ORDER — REGADENOSON 0.08 MG/ML
0.4 INJECTION, SOLUTION INTRAVENOUS ONCE
Status: COMPLETED | OUTPATIENT
Start: 2023-01-05 | End: 2023-01-05

## 2023-01-05 RX ORDER — CAFFEINE CITRATE 20 MG/ML
60 SOLUTION INTRAVENOUS
Status: DISCONTINUED | OUTPATIENT
Start: 2023-01-05 | End: 2023-01-05 | Stop reason: HOSPADM

## 2023-01-05 RX ORDER — CAFFEINE 200 MG
200 TABLET ORAL
Status: DISCONTINUED | OUTPATIENT
Start: 2023-01-05 | End: 2023-01-05 | Stop reason: HOSPADM

## 2023-01-05 RX ORDER — AMINOPHYLLINE 25 MG/ML
50 INJECTION, SOLUTION INTRAVENOUS
Status: DISCONTINUED | OUTPATIENT
Start: 2023-01-05 | End: 2023-01-05 | Stop reason: HOSPADM

## 2023-01-05 RX ADMIN — Medication 32.7 MILLICURIE: at 10:37

## 2023-01-05 RX ADMIN — Medication 8.75 MCI.: at 08:52

## 2023-01-05 RX ADMIN — REGADENOSON 0.4 MG: 0.08 INJECTION, SOLUTION INTRAVENOUS at 09:42

## 2023-01-06 ENCOUNTER — TELEPHONE (OUTPATIENT)
Dept: CARDIOLOGY | Facility: CLINIC | Age: 83
End: 2023-01-06

## 2023-01-06 NOTE — TELEPHONE ENCOUNTER
----- Message from MATTIE Talavera sent at 1/6/2023  7:51 AM CST -----  Regarding: Reid MARQUEZ Notified Dr. Pena  Good Morning,    We received 2 Reid MARQUEZ notifications for Dr. Pena. ECG strips are saved.    Thank you!    Miriam Huitron    CEP  [phone]

## 2023-01-06 NOTE — TELEPHONE ENCOUNTER
----- Message from MATTIE Talavera sent at 1/6/2023 12:12 PM CST -----  Regarding: Reid MARQUEZ Notify Dr. Pena (3rd notification)  Good Morning,     We received a 3rd Reid MARQUEZ notifications for Dr. Pena. ECG strips are saved.     Thank you!     Miriam Huitron     CEP   [phone]

## 2023-01-06 NOTE — TELEPHONE ENCOUNTER
Pt with known a-fib, on Eliquis.  Discussed with Vernon - he said pt would not remember symptoms if she had some, and he doesn't have any recollection of any symptoms.  -sammi

## 2023-01-09 NOTE — TELEPHONE ENCOUNTER
----- Message from MATTIE Jones sent at 1/9/2023  7:11 AM CST -----  Regarding: Heart Monitor notifications  Good morning!    There are heart monitor notifications for Dr. Pena's review in the [employer] folder on [employer].    Thank you and have a great day!    Masood Rodriguez MS CEP  Exercise Physiologist

## 2023-01-09 NOTE — TELEPHONE ENCOUNTER
Discussed with Vernon and pt.  Pt does not recall being dizzy or lightheaded, and Vernon said she hasn't c/o being dizzy or lightheaded.  Times correlate to when pt is sleeping or napping mostly.      Most recent 4 events printed and handed to Dr Pena.  -sammi    ===View-only below this line===  ----- Message -----  From: Audie Pena MD  Sent: 1/6/2023   5:17 PM CST  To: Kristy Lomeli RN    There is occasional 3-second pauses.  Can we determine if she is having any symptoms of dizziness or lightheadedness.  If not I would continue to monitor.  Thank you for your help.

## 2023-01-10 ENCOUNTER — TELEPHONE (OUTPATIENT)
Dept: CARDIOLOGY | Facility: CLINIC | Age: 83
End: 2023-01-10

## 2023-01-10 NOTE — TELEPHONE ENCOUNTER
----- Message from MATTIE Garcia sent at 1/10/2023 10:05 AM CST -----  Regarding: MD notify  Second MD notify saved for your review, thanks!

## 2023-01-10 NOTE — TELEPHONE ENCOUNTER
A-fib with pauses, this one has pause over 4 sec.  LVM to call.  -[facility]        ----- Message from MATTIE Garcia sent at 1/10/2023  9:21 AM CST -----  Regarding: MD notify  MD Notifmary morton for your review, thanks!

## 2023-01-10 NOTE — TELEPHONE ENCOUNTER
===View-only below this line===  ----- Message -----  From: Audie Pena MD  Sent: 1/10/2023  12:31 PM CST  To: Kristy Lomeli RN, MD Jose Sousa  This patient of mine has persistent atrial fibrillation.  No symptoms.  Mild underlying dementia.  We are getting alerts for pauses upwards of 4 seconds.  No reported syncope.  No rate control medicines.  What would be your threshold to offer a pacemaker.  Thanks Trevor

## 2023-01-11 ENCOUNTER — TELEPHONE (OUTPATIENT)
Dept: CARDIOLOGY | Facility: CLINIC | Age: 83
End: 2023-01-11

## 2023-01-11 NOTE — TELEPHONE ENCOUNTER
===View-only below this line===  ----- Message -----  From: Michael Root MD  Sent: 1/11/2023  11:55 AM CST  To: Kristy Lomeli RN, MD Jose Sabillon and Kristy,    Thanks for the note, and hope you both have been well.  Usually >5s unexplained/primary pauses with symptoms.  If she has had <5s pauses which are asymptomatic generally can follow expectantly.  Considerations become a bit more complex for >5s secondary pauses, for example, pauses thought to be due to PANKAJ, vagally mediated.  Also reassuring that she has a narrow QRS and ~normal WA in SR, since the question in case of pauses in AF is really one regarding AVN/HPS health.    Ilia Espinal      ----- Message -----  From: Audie Pena MD  Sent: 1/10/2023  12:31 PM CST  To: Kristy Lomeli RN, Michael Root MD    Hi Ilia  This patient of mine has persistent atrial fibrillation.  No symptoms.  Mild underlying dementia.  We are getting alerts for pauses upwards of 4 seconds.  No reported syncope.  No rate control medicines.  What would be your threshold to offer a pacemaker.  Thanks Trevor

## 2023-01-11 NOTE — TELEPHONE ENCOUNTER
----- Message from MATTIE Talavera sent at 1/11/2023 12:15 PM CST -----  Regarding: Reid MARQUEZ Notify Dr. Pena  Hi!    We received a Reid MARQUEZ Notify for a patient of Dr. Pena. ECG strips are saved.    Thank you!    Miriam Huitron    CEP  [phone]

## 2023-01-11 NOTE — TELEPHONE ENCOUNTER
===View-only below this line===  ----- Message -----  From: Audie Pena MD  Sent: 1/11/2023   4:17 PM CST  To: Kristy Lomeli RN, Michael Root MD    Thanks for your input Ilia I think that is reasonable but also varies amongst the providers.  Kristy can we please let Ele and her  know about this discussion and to certainly keep us informed regarding any dizziness or lightheadedness.  Thanks

## 2023-01-11 NOTE — TELEPHONE ENCOUNTER
No symptoms noted - pt was dozing in the recliner at the time of event.  -[facility]

## 2023-01-12 ENCOUNTER — TELEPHONE (OUTPATIENT)
Dept: CARDIOLOGY | Facility: CLINIC | Age: 83
End: 2023-01-12

## 2023-01-12 NOTE — TELEPHONE ENCOUNTER
----- Message from MATTIE Garcia sent at 1/12/2023  7:29 AM CST -----  Regarding: MD Notify  MD notify saved for your review, thanks!

## 2023-01-12 NOTE — TELEPHONE ENCOUNTER
Call placed to monitor tech, at [facility]. Discussion of adjustment of [employer] notifications as per recommendation from MDG for either symptoms or pause >4.5 seconds. Note sent to company, also noted for MT's to inform. CMM,Rn     PC to patient and informed no new recommendations after MDG review. Encouraged to wear heart monitor for full 14 days. Report any symptoms while wearing. CMM,Rn

## 2023-01-12 NOTE — TELEPHONE ENCOUNTER
===View-only below this line===  ----- Message -----  From: Audie Pena MD  Sent: 1/12/2023  11:10 AM CST  To: Justo Olea RN    I would continue with the full timeframe.  Per discussion with Dr. Root if asymptomatic and atrial fibrillation related not pursue pacemaker for pauses less than 5 seconds.  I am most interested to see if she converts back to normal rhythm if there is a longer pause.  Please change the criteria however to notify us for pauses greater than 4.5 seconds or symptoms.

## 2023-01-25 PROCEDURE — 93228 REMOTE 30 DAY ECG REV/REPORT: CPT | Performed by: INTERNAL MEDICINE

## 2023-02-03 DIAGNOSIS — I48.19 PERSISTENT ATRIAL FIBRILLATION (H): Primary | ICD-10-CM

## 2023-02-03 NOTE — PROGRESS NOTES
Audie Pena MD   1/29/2023 11:28 AM CST       Pauses upwards of 4.1 seconds, I believe no symptoms of dizziness if we an confirm, per Dr Washington if no symptoms 5 seconds is the cut off but if any dizziness would have low threshold to pursue pacer.Would she like to meet with ep? I would recommend that  ty mdg     EP follow-up order placed.  jose

## 2023-02-07 NOTE — PROGRESS NOTES
HEART CARE ENCOUNTER CONSULTATON NOTE      [facility]  [phone]      Assessment/Recommendations   Assessment/Plan:    Jodee Johnson is a very pleasant 82 year old female with persistent atrial fibrillation,hypertension, dyslipidemia, hemorrhagic stroke 2015, hypothyroidism  who presents today to discuss recent monitor results.    1.  Persistent atrial fibrillation  - Has Afib with slow RVR with pauses upto 4.1 seconds noted on MCOT  - Asymptomatic both from slow rates and Afib as such  - Recommend continuing to monitor at this time    2. Anticoagulation  - CHADSVASC score of 6  - HASBLED score of ~5  - Has a history of a previous hemorrhagic stroke per chart review  - Also has a higher risk of falls  - Will refer to our [facility] clinic       History of Present Illness/Subjective    HPI: Jodee Johnson is a very pleasant 82 year old female with persistent atrial fibrillation, HTN, hyperlipidemia, History of left breast cancer with status post surgery who presents today to discuss recent monitor results.    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 She has never had symptoms from her Afib. She does have occasionl light headedness when she gets up from a sitting or lying down position.  She does not have an episode of syncope chest pain or chest discomfort, worsening fatigue or worsening shortness of breath.    She does have a history of a hemorrhagic stroke in 2015 she also says she has problems with her gait and balance.  She has been compliant with all of her medications including Eliquis and has not noted any major bleeds as yet.      [facility] January 2023  Mobile cardiac telemetry monitoring from 1/5/2023 to 1/18/2023 (monitored duration 10d 11h 3m).  Continuous atrial fibrillation, ventricular rates 21 (1/6/2023 04:54am) to 132bpm, average 59bpm.  60 pauses of over 2.0s, longest 4.1s (1/9/2023 21:46) - these were noted during daytime and nighttime hours.  Narrow QRS.    Rare premature  "ventricular contractions (<1%).  Symptom triggers (1, other) correlated to atrial fibrillation with ventricular rate 70bpm.      Recent Stress test  January 2023       The nuclear stress test is negative for inducible myocardial ischemia or infarction.     Left ventricular function is normal. The left ventricular ejection fraction at rest is 65%.     A prior study was conducted on 5/9/2014.  Prior images were unavailable for comparison review.      Labs below reviewed personally     Physical Examination  Review of Systems   Vitals: /75 (BP Location: Right arm, Patient Position: Sitting, Cuff Size: Adult Large)   Pulse 88   Resp 16   Ht 1.588 m (5' 2.5\")   Wt 82.6 kg (182 lb)   BMI 32.76 kg/m    BMI= Body mass index is 32.76 kg/m .  Wt Readings from Last 3 Encounters:   02/08/23 82.6 kg (182 lb)   12/19/22 83 kg (183 lb)   05/13/22 84.4 kg (186 lb)       General Appearance:   no distress, normal body habitus   ENT/Mouth: membranes moist, no oral lesions or bleeding gums.      EYES:  no scleral icterus, normal conjunctivae   Neck: no carotid bruits or thyromegaly   Chest/Lungs:   lungs are clear to auscultation, no rales or wheezing, no sternal scar, equal chest wall expansion    Cardiovascular:   Irregular. Normal first and second heart sounds with no murmurs, rubs, or gallops; the carotid, radial and posterior tibial pulses are intact, no edema bilaterally    Abdomen:  no organomegaly, masses, bruits, or tenderness; bowel sounds are present   Extremities: no cyanosis or clubbing   Skin: no xanthelasma, warm.    Neurologic: normal  bilateral, no tremors     Psychiatric: alert and oriented x3, calm        Please refer above for cardiac ROS details.        Medical History  Surgical History Family History Social History   Past Medical History:   Diagnosis Date     Atrial fibrillation (H)      Cancer (H)     breast     Depression      Hemorrhagic cerebrovascular accident (CVA) (H)     due to HTN?     " Hyperlipidemia      Hypertension      Hypotension, unspecified hypotension type 8/17/2021     Hypothyroidism      Restless leg syndrome      Weight loss 8/17/2021     Past Surgical History:   Procedure Laterality Date     LUMPECTOMY BREAST  01/20/2022     No family history on file.     Social History     Socioeconomic History     Marital status: Single     Spouse name: Not on file     Number of children: Not on file     Years of education: Not on file     Highest education level: Not on file   Occupational History     Not on file   Tobacco Use     Smoking status: Former     Packs/day: 0.00     Types: Cigarettes     Smokeless tobacco: Never   Substance and Sexual Activity     Alcohol use: Not on file     Drug use: Not on file     Sexual activity: Not on file   Other Topics Concern     Parent/sibling w/ CABG, MI or angioplasty before 65F 55M? Not Asked   Social History Narrative     Not on file     Social Determinants of Health     Financial Resource Strain: Not on file   Food Insecurity: Not on file   Transportation Needs: Not on file   Physical Activity: Not on file   Stress: Not on file   Social Connections: Not on file   Intimate Partner Violence: Not on file   Housing Stability: Not on file           Medications  Allergies   Current Outpatient Medications   Medication Sig Dispense Refill     acetaminophen (TYLENOL) 500 MG tablet Take 500-1,000 mg by mouth every 6 hours as needed for pain       apixaban ANTICOAGULANT (ELIQUIS) 5 MG tablet Take 1 tablet (5 mg) by mouth 2 times daily 180 tablet 4     ARIPiprazole (ABILIFY) 5 MG tablet Take 5 mg by mouth every morning       aspirin-acetaminophen-caffeine (EXCEDRIN MIGRAINE) 250-250-65 MG tablet Take 1 tablet by mouth every 4 hours as needed for headaches       cholecalciferol, vitamin D3, 50 mcg (2,000 unit) capsule [CHOLECALCIFEROL, VITAMIN D3, 50 MCG (2,000 UNIT) CAPSULE] Take 1 capsule by mouth daily.       cloNIDine HCL (CATAPRES) 0.1 MG tablet Take 1 tablet by  mouth 2 times daily Hold if BP <100/60       cyanocobalamin 1000 MCG tablet [CYANOCOBALAMIN 1000 MCG TABLET] Take 1,000 mcg by mouth daily.       divalproex sodium delayed-release (DEPAKOTE) 250 MG DR tablet Take 250 mg by mouth 2 times daily       DULoxetine HCl 40 MG CPEP Take 40 mg by mouth daily       exemestane (AROMASIN) 25 MG tablet Take 25 mg by mouth daily       levothyroxine (SYNTHROID/LEVOTHROID) 137 MCG tablet Take 68.5 mcg by mouth daily       losartan (COZAAR) 25 MG tablet Take 1 tablet by mouth daily at 2 pm       Melatonin 10 MG CAPS Take 10 mg by mouth nightly as needed for sleep       memantine (NAMENDA) 5 MG tablet Take 2.5 mg by mouth 2 times daily       potassium chloride ER (KLOR-CON M) 20 MEQ CR tablet Take 20 mEq by mouth daily       rosuvastatin (CRESTOR) 5 MG tablet Take 5 mg by mouth At Bedtime        traZODone (DESYREL) 50 MG tablet Take 50 mg by mouth At Bedtime       estradiol (ESTRACE) 0.1 MG/GM vaginal cream          Allergies   Allergen Reactions     Atenolol Unknown     Other reaction(s): Bradycardia, Other reaction(s): Bradycardia     Hydrochlorothiazide Unknown     Other reaction(s): Hypercalcemia, Other reaction(s): Hypercalcemia     Gabapentin Other (See Comments)     Other reaction(s): Confusion, Other reaction(s): Insomnia, Unknown, insomnia, insomnia     Lisinopril Nausea     Other reaction(s): GI Upset, Other reaction(s): Abdominal Pain     Nortriptyline Unknown     Oxycodone Itching     Paroxetine Unknown     Other reaction(s): Drowsiness, Sedation, Other reaction(s): Sedation     Tetracycline Photosensitivity and Rash     Amlodipine Unknown     Other reaction(s): GI Upset, Vomiting, Other reaction(s): Abdominal Pain     Codeine Itching     Tolerates tylenol 3 at home per pt     Oxycodone-Acetaminophen Itching     Sertraline Unknown     Other reaction(s): Sedation, Other reaction(s): Sedation     Diphenhydramine Anxiety     Other reaction(s): Agitation, Other reaction(s):  Unknown          Lab Results    Chemistry/lipid CBC Cardiac Enzymes/BNP/TSH/INR   No results for input(s): CHOL, HDL, LDL, TRIG, CHOLHDLRATIO in the last 28672 hours.  No results for input(s): LDL in the last 96029 hours.  Recent Labs   Lab Test 02/17/22  1408      POTASSIUM 4.2   CHLORIDE 107   CO2 27   GLC 94   BUN 17   CR 0.74   GFRESTIMATED 81   DANTE 10.1     Recent Labs   Lab Test 02/17/22  1408   CR 0.74     No results for input(s): A1C in the last 92871 hours.       No results for input(s): WBC, HGB, HCT, MCV, PLT in the last 55298 hours.  No results for input(s): HGB in the last 82453 hours. No results for input(s): TROPONINI in the last 03146 hours.  No results for input(s): BNP, NTBNPI, NTBNP in the last 23879 hours.  No results for input(s): TSH in the last 67354 hours.  No results for input(s): INR in the last 61663 hours.     Shavon Hobson MD

## 2023-02-08 ENCOUNTER — OFFICE VISIT (OUTPATIENT)
Dept: CARDIOLOGY | Facility: CLINIC | Age: 83
End: 2023-02-08
Attending: INTERNAL MEDICINE
Payer: COMMERCIAL

## 2023-02-08 VITALS
HEIGHT: 63 IN | WEIGHT: 182 LBS | HEART RATE: 88 BPM | BODY MASS INDEX: 32.25 KG/M2 | SYSTOLIC BLOOD PRESSURE: 134 MMHG | DIASTOLIC BLOOD PRESSURE: 75 MMHG | RESPIRATION RATE: 16 BRPM

## 2023-02-08 DIAGNOSIS — I48.19 PERSISTENT ATRIAL FIBRILLATION (H): ICD-10-CM

## 2023-02-08 PROCEDURE — 99214 OFFICE O/P EST MOD 30 MIN: CPT | Performed by: INTERNAL MEDICINE

## 2023-02-08 RX ORDER — LOSARTAN POTASSIUM 25 MG/1
1 TABLET ORAL
COMMUNITY
Start: 2022-12-29

## 2023-02-08 RX ORDER — ESTRADIOL 0.1 MG/G
CREAM VAGINAL
COMMUNITY
Start: 2023-01-25

## 2023-02-08 NOTE — LETTER
2/8/2023    YO PALUMBO  [facility] [address] [place] [address]    RE: Jodee Johnson       Dear Colleague,     I had the pleasure of seeing Jodee Johnson in the [facility].    HEART CARE ENCOUNTER CONSULTATON NOTE      [facility]  [phone]      Assessment/Recommendations   Assessment/Plan:    Jodee Johnson is a very pleasant 82 year old female with persistent atrial fibrillation,hypertension, dyslipidemia, hemorrhagic stroke 2015, hypothyroidism  who presents today to discuss recent monitor results.    1.  Persistent atrial fibrillation  - Has Afib with slow RVR with pauses upto 4.1 seconds noted on MCOT  - Asymptomatic both from slow rates and Afib as such  - Recommend continuing to monitor at this time    2. Anticoagulation  - CHADSVASC score of 6  - HASBLED score of ~5  - Has a history of a previous hemorrhagic stroke per chart review  - Also has a higher risk of falls  - Will refer to our [facility]       History of Present Illness/Subjective    HPI: Jodee Johnson is a very pleasant 82 year old female with persistent atrial fibrillation, HTN, hyperlipidemia, History of left breast cancer with status post surgery who presents today to discuss recent monitor results.    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 She does have occasionl light headedness when she gets up from a sitting or lying down position.  She does not have an episode of syncope chest pain or chest discomfort, worsening fatigue or worsening shortness of breath.    She does have a history of a hemorrhagic stroke in 2015 she also says she has problems with her gait and balance.  She has been compliant with all of her medications including Eliquis and has not noted any major bleeds as yet.      OT January 2023  Mobile cardiac telemetry monitoring from 1/5/2023 to 1/18/2023 (monitored duration 10d 11h 3m).  Continuous atrial  "fibrillation, ventricular rates 21 (1/6/2023 04:54am) to 132bpm, average 59bpm.  60 pauses of over 2.0s, longest 4.1s (1/9/2023 21:46) - these were noted during daytime and nighttime hours.  Narrow QRS.    Rare premature ventricular contractions (<1%).  Symptom triggers (1, other) correlated to atrial fibrillation with ventricular rate 70bpm.      Recent Stress test  January 2023       The nuclear stress test is negative for inducible myocardial ischemia or infarction.     Left ventricular function is normal. The left ventricular ejection fraction at rest is 65%.     A prior study was conducted on 5/9/2014.  Prior images were unavailable for comparison review.      Labs below reviewed personally     Physical Examination  Review of Systems   Vitals: /75 (BP Location: Right arm, Patient Position: Sitting, Cuff Size: Adult Large)   Pulse 88   Resp 16   Ht 1.588 m (5' 2.5\")   Wt 82.6 kg (182 lb)   BMI 32.76 kg/m    BMI= Body mass index is 32.76 kg/m .  Wt Readings from Last 3 Encounters:   02/08/23 82.6 kg (182 lb)   12/19/22 83 kg (183 lb)   05/13/22 84.4 kg (186 lb)       General Appearance:   no distress, normal body habitus   ENT/Mouth: membranes moist, no oral lesions or bleeding gums.      EYES:  no scleral icterus, normal conjunctivae   Neck: no carotid bruits or thyromegaly   Chest/Lungs:   lungs are clear to auscultation, no rales or wheezing, no sternal scar, equal chest wall expansion    Cardiovascular:   Irregular. Normal first and second heart sounds with no murmurs, rubs, or gallops; the carotid, radial and posterior tibial pulses are intact, no edema bilaterally    Abdomen:  no organomegaly, masses, bruits, or tenderness; bowel sounds are present   Extremities: no cyanosis or clubbing   Skin: no xanthelasma, warm.    Neurologic: normal  bilateral, no tremors     Psychiatric: alert and oriented x3, calm        Please refer above for cardiac ROS details.        Medical History  Surgical " History Family History Social History   Past Medical History:   Diagnosis Date     Atrial fibrillation (H)      Cancer (H)     breast     Depression      Hemorrhagic cerebrovascular accident (CVA) (H)     due to HTN?     Hyperlipidemia      Hypertension      Hypotension, unspecified hypotension type 8/17/2021     Hypothyroidism      Restless leg syndrome      Weight loss 8/17/2021     Past Surgical History:   Procedure Laterality Date     LUMPECTOMY BREAST  01/20/2022     No family history on file.     Social History     Socioeconomic History     Marital status: Single     Spouse name: Not on file     Number of children: Not on file     Years of education: Not on file     Highest education level: Not on file   Occupational History     Not on file   Tobacco Use     Smoking status: Former     Packs/day: 0.00     Types: Cigarettes     Smokeless tobacco: Never   Substance and Sexual Activity     Alcohol use: Not on file     Drug use: Not on file     Sexual activity: Not on file   Other Topics Concern     Parent/sibling w/ CABG, MI or angioplasty before 65F 55M? Not Asked   Social History Narrative     Not on file     Social Determinants of Health     Financial Resource Strain: Not on file   Food Insecurity: Not on file   Transportation Needs: Not on file   Physical Activity: Not on file   Stress: Not on file   Social Connections: Not on file   Intimate Partner Violence: Not on file   Housing Stability: Not on file           Medications  Allergies   Current Outpatient Medications   Medication Sig Dispense Refill     acetaminophen (TYLENOL) 500 MG tablet Take 500-1,000 mg by mouth every 6 hours as needed for pain       apixaban ANTICOAGULANT (ELIQUIS) 5 MG tablet Take 1 tablet (5 mg) by mouth 2 times daily 180 tablet 4     ARIPiprazole (ABILIFY) 5 MG tablet Take 5 mg by mouth every morning       aspirin-acetaminophen-caffeine (EXCEDRIN MIGRAINE) 250-250-65 MG tablet Take 1 tablet by mouth every 4 hours as needed for  headaches       cholecalciferol, vitamin D3, 50 mcg (2,000 unit) capsule [CHOLECALCIFEROL, VITAMIN D3, 50 MCG (2,000 UNIT) CAPSULE] Take 1 capsule by mouth daily.       cloNIDine HCL (CATAPRES) 0.1 MG tablet Take 1 tablet by mouth 2 times daily Hold if BP <100/60       cyanocobalamin 1000 MCG tablet [CYANOCOBALAMIN 1000 MCG TABLET] Take 1,000 mcg by mouth daily.       divalproex sodium delayed-release (DEPAKOTE) 250 MG DR tablet Take 250 mg by mouth 2 times daily       DULoxetine HCl 40 MG CPEP Take 40 mg by mouth daily       exemestane (AROMASIN) 25 MG tablet Take 25 mg by mouth daily       levothyroxine (SYNTHROID/LEVOTHROID) 137 MCG tablet Take 68.5 mcg by mouth daily       losartan (COZAAR) 25 MG tablet Take 1 tablet by mouth daily at 2 pm       Melatonin 10 MG CAPS Take 10 mg by mouth nightly as needed for sleep       memantine (NAMENDA) 5 MG tablet Take 2.5 mg by mouth 2 times daily       potassium chloride ER (KLOR-CON M) 20 MEQ CR tablet Take 20 mEq by mouth daily       rosuvastatin (CRESTOR) 5 MG tablet Take 5 mg by mouth At Bedtime        traZODone (DESYREL) 50 MG tablet Take 50 mg by mouth At Bedtime       estradiol (ESTRACE) 0.1 MG/GM vaginal cream          Allergies   Allergen Reactions     Atenolol Unknown     Other reaction(s): Bradycardia, Other reaction(s): Bradycardia     Hydrochlorothiazide Unknown     Other reaction(s): Hypercalcemia, Other reaction(s): Hypercalcemia     Gabapentin Other (See Comments)     Other reaction(s): Confusion, Other reaction(s): Insomnia, Unknown, insomnia, insomnia     Lisinopril Nausea     Other reaction(s): GI Upset, Other reaction(s): Abdominal Pain     Nortriptyline Unknown     Oxycodone Itching     Paroxetine Unknown     Other reaction(s): Drowsiness, Sedation, Other reaction(s): Sedation     Tetracycline Photosensitivity and Rash     Amlodipine Unknown     Other reaction(s): GI Upset, Vomiting, Other reaction(s): Abdominal Pain     Codeine Itching     Tolerates  tylenol 3 at home per pt     Oxycodone-Acetaminophen Itching     Sertraline Unknown     Other reaction(s): Sedation, Other reaction(s): Sedation     Diphenhydramine Anxiety     Other reaction(s): Agitation, Other reaction(s): Unknown          Lab Results    Chemistry/lipid CBC Cardiac Enzymes/BNP/TSH/INR   No results for input(s): CHOL, HDL, LDL, TRIG, CHOLHDLRATIO in the last 83508 hours.  No results for input(s): LDL in the last 94278 hours.  Recent Labs   Lab Test 02/17/22  1408      POTASSIUM 4.2   CHLORIDE 107   CO2 27   GLC 94   BUN 17   CR 0.74   GFRESTIMATED 81   DANTE 10.1     Recent Labs   Lab Test 02/17/22  1408   CR 0.74     No results for input(s): A1C in the last 91959 hours.       No results for input(s): WBC, HGB, HCT, MCV, PLT in the last 25347 hours.  No results for input(s): HGB in the last 54741 hours. No results for input(s): TROPONINI in the last 06496 hours.  No results for input(s): BNP, NTBNPI, NTBNP in the last 05342 hours.  No results for input(s): TSH in the last 51234 hours.  No results for input(s): INR in the last 58459 hours.     Shavon Hobson MD        Thank you for allowing me to participate in the care of your patient.      Sincerely,     Shavon Hobson MD     [facility]  cc:   Audie Pena MD  [address]  [place] [address]

## 2023-02-09 ENCOUNTER — TELEPHONE (OUTPATIENT)
Dept: CARDIOLOGY | Facility: CLINIC | Age: 83
End: 2023-02-09
Payer: COMMERCIAL

## 2023-02-09 NOTE — TELEPHONE ENCOUNTER
Consult scheduled. -SC    ----- Message -----  From: Sherie Schilling  Sent: 2/9/2023   3:03 PM CST  To: Rula Pimentel RN    03/01  ----- Message -----  From: Rula Pimentel RN  Sent: 2/9/2023   1:46 PM CST  To: Sherie Schilling    ----- Message from Rula Pimentel RN sent at 2/9/2023  1:46 PM CST -----  Jose Rehman-  Could you set up an appointment for a consult for this patient. Thank you! -Rula

## 2023-02-09 NOTE — TELEPHONE ENCOUNTER
LAAC Referral made from Dr. Hobson. Called patient to let them know next steps would be to have a consult with Marissa to determine if they would be a good candidate for LAAC procedure and determine type of imaging moving forward. Patient stated they would like to move forward with scheduling this appointment and instructed that they would call with in the next week to schedule this. Routed to scheduling. Patient declined writers number. -SC

## 2023-02-12 NOTE — PROGRESS NOTES
HEART CARE ENCOUNTER CONSULTATON NOTE      [facility]  [phone]      Assessment/Recommendations   Assessment/Plan:  1.  Persistent atrial fibrillation with periods of trend towards bradycardia.  I very much appreciate the input from my EP colleague Dr. Hobson.  There is no current recommendation for pacemaker although discussed the importance of monitoring for complaints of increasing dizziness or lightheadedness.  She had pauses on recent monitor upwards of 4.1 seconds.  This has been felt to be asymptomatic.  We discussed the risk of stroke versus the risk of bleeding.  She has a chads vascular score of 6 and has bled score of approximately 5.  She has a history of prior hemorrhagic stroke.  She has a higher risk of falls.  She is in agreement with respect to referral to the Watchman device which we spent some time discussing today.    2.  EKG changes on the most recent EKG during our visit in December without associated symptoms of chest discomfort.  Nuclear stress test completed January 5 was negative for inducible ischemia with normal left ventricular function.    3.  Dyslipidemia.  Most recent lipids are from October 2022 at which time total cholesterol was 123, triglycerides 123, LDL 55.    4.  Daytime sleepiness.  Likely multifactorial in part related to her psychiatric medications.  Her  recalls that she did have a prior sleep study.  I has not been able to locate this in the outside chart records.  She has an upcoming follow-up with her primary care provider and I did ask that they discuss this further with her primary care provider.      Plan.  Referral for Watchman device given higher risk of bleeding.  Discussed avoidance of nonsteroidal anti-inflammatory medications.  She does have listed Excedrin Migraine which I would recommend she avoid in favor of Tylenol.  Patient to discuss with her primary care physician prior history of sleep apnea evaluation.  I would  appreciate an update.  Patient to monitor for symptoms of dizziness or lightheadedness and update us as soon as possible for development of symptoms.  Follow-up in 3 to 4 months.      (Patient) Has documented nonvalvular atrial fibrillation (NVAF) and is currently on oral anticoagulant therapy Eliquis   [id number] -VASc = 6   HAS-BLED risk score: 4  Indication(s) to consider non-oral anticoagulation therapy: Significant fall risk and bleeding risk.  Pt has no contra-indication to come off oral anti-coagulant therapy if LAAC device is successfully placed.     I have personally reviewed the patients chart and discussed the following with the patient/family; 1) The choices available for reducing stroke risk from atrial fibrillation, 2) Treatment options available including respective risk/benefits, and 3) What factors are most important for the patient in making their decision.  The [facility] shared decision making tool https://www.cardiosmart.org/SDM/Decision-Aids/Find-Decision-Aids/Atrial-Fibrillation  was used to guide this conversation.   The patient was counseled that their decision could be made at this time or in the future if more time was needed to consider their decision.       The patient is an appropriate candidate to proceed with left atrial appendage screening and implant.              History of Present Illness/Subjective    HPI: Jodee Johnson is a 82 year old female who is seen in follow-up.  I appreciate recent notes from my EP colleagues.  She has a history of hypertension, persistent atrial fibrillation and hyperlipidemia.  She wore an event monitor recently that did demonstrate pauses upwards of 4 seconds.  She has a history of hemorrhagic stroke in 2015 and does have some issues with chronic gait instability.  She has not noted any additional bleeding on Eliquis.  I had previously discussed the possibility of a Watchman device which was again discussed with my EP colleague February 8, 2023 and was referred  for watchman consideration.  No recommendations for pacemaker at this time with recommendations for close monitoring.  She did have persistent nonspecific ST-T changes on EKG when we visited in December which prompted nuclear stress testing which was negative for ischemia.    She has no specific cardiovascular complaints.  Specifically denying any chest pain, shortness of breath, dizziness or lightheadedness.  Her  does indicate that she feels tired during the day.  Reportedly through her outside clinic she has had a prior sleep evaluation but I could not locate the results.  She will be following up with her primary care physician in the near future and I have asked that they discuss this question with her as well.  Today I reviewed the findings of the event monitor and recommendations from my EP colleagues.  We spent some time talking about the Watchman device.  I indicated that I am concerned that she does have a higher risk of bleeding given prior history of hemorrhagic stroke and some gait instability is also a higher risk of stroke without measures to prevent stroke from atrial fibrillation.  I reviewed the Watchman device procedure in detail as well.    Recent Echocardiogram Results:      Recent Coronary Angiogram Results:    Cardiac Electrophysiology  Mobile cardiac telemetry monitor report     Mobile cardiac telemetry monitoring from 1/5/2023 to 1/18/2023 (monitored duration 10d 11h 3m).  Continuous atrial fibrillation, ventricular rates 21 (1/6/2023 04:54am) to 132bpm, average 59bpm.  60 pauses of over 2.0s, longest 4.1s (1/9/2023 21:46) - these were noted during daytime and nighttime hours.  Narrow QRS.    Rare premature ventricular contractions (<1%).  Symptom triggers (1, other) correlated to atrial fibrillation with ventricular rate 70bpm.     Electronically signed by Michael Root MD  1/25/2023  1:53 PM     NM Lexiscan stress test  Order: [id number]   Status: Final result      Visible  to patient: No ([facility]      Next appt: 02/13/2023 at 12:50 PM in Cardiology (Audie Pena MD)      Dx: Nonspecific ST-T wave electrocardiogr...      3 Result Notes  Details    Reading Physician Reading Date Result Priority   Wes Veronica DO  [phone] 1/5/2023 Routine   Wes Veronica,   [phone] 1/5/2023      Result Text        The nuclear stress test is negative for inducible myocardial ischemia or infarction.     Left ventricular function is normal. The left ventricular ejection fraction at rest is 65%.     A prior study was conducted on 5/9/2014.  Prior images were unavailable for comparison review.        A prior study was conducted on 5/9/2014.  Prior images were unavailable for comparison review.            Atrial fibrillation   Marked ST abnormality, possible inferior subendocardial injury   Abnormal ECG   When compared with ECG of 13-MAY-2022 13:55,   Significant changes have occurred   Confirmed by ALESSANDRA PATE MD LOC:[facility] ([id number]) on 12/20/2022 3:31:19 PM  Sinus bradycardia   Leftward axis   Otherwise normal ECG   When compared with ECG of 22-DEC-2021 14:09,   Sinus rhythm has replaced Atrial fibrillation   Confirmed by JAXSON RUBIO MD LOC:[facility] ([id number]) on 5/16/2022 11:28:32 AM           Physical Examination  Review of Systems   Vitals: 128/78, weight 181 pounds, heart rate of 52-64 and regular with occasional extrasystolic complexes.  Wt Readings from Last 3 Encounters:   02/08/23 82.6 kg (182 lb)   12/19/22 83 kg (183 lb)   05/13/22 84.4 kg (186 lb)       General Appearance:   no distress, normal body habitus   ENT/Mouth:  Wearing a facemask   EYES:  no scleral icterus, normal conjunctivae   Neck: no carotid bruits or thyromegaly   Chest/Lungs:   lungs are clear to auscultation, no rales or wheezingequal chest wall expansion    Cardiovascular:    Regularly irregular. Normal first and second heart sounds with soft systolic murmur, rubs, or gallops; the  carotid, radial and posterior tibial pulses are intact, Jugular venous pressure not obviously increased although examined in the chair, no significant edema bilaterally    Abdomen:  no organomegaly, masses, bruits, or tenderness; bowel sounds are present   Extremities: no cyanosis or clubbing   Skin: no xanthelasma, warm.    Neurologic:  no tremors     Psychiatric: alert and oriented x3, calm        Please refer above for cardiac ROS details.        Medical History  Surgical History Family History Social History   Past Medical History:   Diagnosis Date     Atrial fibrillation (H)      Cancer (H)     breast     Depression      Hemorrhagic cerebrovascular accident (CVA) (H)     due to HTN?     Hyperlipidemia      Hypertension      Hypotension, unspecified hypotension type 8/17/2021     Hypothyroidism      Restless leg syndrome      Weight loss 8/17/2021     Past Surgical History:   Procedure Laterality Date     LUMPECTOMY BREAST  01/20/2022     No family history on file.     Social History     Socioeconomic History     Marital status: Single     Spouse name: Not on file     Number of children: Not on file     Years of education: Not on file     Highest education level: Not on file   Occupational History     Not on file   Tobacco Use     Smoking status: Former     Packs/day: 0.00     Types: Cigarettes     Smokeless tobacco: Never   Substance and Sexual Activity     Alcohol use: Not on file     Drug use: Not on file     Sexual activity: Not on file   Other Topics Concern     Parent/sibling w/ CABG, MI or angioplasty before 65F 55M? Not Asked   Social History Narrative     Not on file     Social Determinants of Health     Financial Resource Strain: Not on file   Food Insecurity: Not on file   Transportation Needs: Not on file   Physical Activity: Not on file   Stress: Not on file   Social Connections: Not on file   Intimate Partner Violence: Not on file   Housing Stability: Not on file           Medications  Allergies    Current Outpatient Medications   Medication Sig Dispense Refill     acetaminophen (TYLENOL) 500 MG tablet Take 500-1,000 mg by mouth every 6 hours as needed for pain       apixaban ANTICOAGULANT (ELIQUIS) 5 MG tablet Take 1 tablet (5 mg) by mouth 2 times daily 180 tablet 4     ARIPiprazole (ABILIFY) 5 MG tablet Take 5 mg by mouth every morning       aspirin-acetaminophen-caffeine (EXCEDRIN MIGRAINE) 250-250-65 MG tablet Take 1 tablet by mouth every 4 hours as needed for headaches       cholecalciferol, vitamin D3, 50 mcg (2,000 unit) capsule [CHOLECALCIFEROL, VITAMIN D3, 50 MCG (2,000 UNIT) CAPSULE] Take 1 capsule by mouth daily.       cloNIDine HCL (CATAPRES) 0.1 MG tablet Take 1 tablet by mouth 2 times daily Hold if BP <100/60       cyanocobalamin 1000 MCG tablet [CYANOCOBALAMIN 1000 MCG TABLET] Take 1,000 mcg by mouth daily.       divalproex sodium delayed-release (DEPAKOTE) 250 MG DR tablet Take 250 mg by mouth 2 times daily       DULoxetine HCl 40 MG CPEP Take 40 mg by mouth daily       estradiol (ESTRACE) 0.1 MG/GM vaginal cream        exemestane (AROMASIN) 25 MG tablet Take 25 mg by mouth daily       levothyroxine (SYNTHROID/LEVOTHROID) 137 MCG tablet Take 68.5 mcg by mouth daily       losartan (COZAAR) 25 MG tablet Take 1 tablet by mouth daily at 2 pm       Melatonin 10 MG CAPS Take 10 mg by mouth nightly as needed for sleep       memantine (NAMENDA) 5 MG tablet Take 2.5 mg by mouth 2 times daily       potassium chloride ER (KLOR-CON M) 20 MEQ CR tablet Take 20 mEq by mouth daily       rosuvastatin (CRESTOR) 5 MG tablet Take 5 mg by mouth At Bedtime        traZODone (DESYREL) 50 MG tablet Take 50 mg by mouth At Bedtime         Allergies   Allergen Reactions     Atenolol Unknown     Other reaction(s): Bradycardia, Other reaction(s): Bradycardia     Hydrochlorothiazide Unknown     Other reaction(s): Hypercalcemia, Other reaction(s): Hypercalcemia     Gabapentin Other (See Comments)     Other reaction(s):  Confusion, Other reaction(s): Insomnia, Unknown, insomnia, insomnia     Lisinopril Nausea     Other reaction(s): GI Upset, Other reaction(s): Abdominal Pain     Nortriptyline Unknown     Oxycodone Itching     Paroxetine Unknown     Other reaction(s): Drowsiness, Sedation, Other reaction(s): Sedation     Tetracycline Photosensitivity and Rash     Amlodipine Unknown     Other reaction(s): GI Upset, Vomiting, Other reaction(s): Abdominal Pain     Codeine Itching     Tolerates tylenol 3 at home per pt     Oxycodone-Acetaminophen Itching     Sertraline Unknown     Other reaction(s): Sedation, Other reaction(s): Sedation     Diphenhydramine Anxiety     Other reaction(s): Agitation, Other reaction(s): Unknown          Lab Results    Chemistry/lipid CBC Cardiac Enzymes/BNP/TSH/INR   No results for input(s): CHOL, HDL, LDL, TRIG, CHOLHDLRATIO in the last 91793 hours.  No results for input(s): LDL in the last 84128 hours.  Recent Labs   Lab Test 02/17/22  1408      POTASSIUM 4.2   CHLORIDE 107   CO2 27   GLC 94   BUN 17   CR 0.74   GFRESTIMATED 81   DANTE 10.1     Recent Labs   Lab Test 02/17/22  1408   CR 0.74     No results for input(s): A1C in the last 95097 hours.       No results for input(s): WBC, HGB, HCT, MCV, PLT in the last 88842 hours.  No results for input(s): HGB in the last 99193 hours. No results for input(s): TROPONINI in the last 56264 hours.  No results for input(s): BNP, NTBNPI, NTBNP in the last 38004 hours.  No results for input(s): TSH in the last 53854 hours.  No results for input(s): INR in the last 13773 hours.     Audie Pena MD

## 2023-02-13 ENCOUNTER — TELEPHONE (OUTPATIENT)
Dept: CARDIOLOGY | Facility: CLINIC | Age: 83
End: 2023-02-13

## 2023-02-13 ENCOUNTER — OFFICE VISIT (OUTPATIENT)
Dept: CARDIOLOGY | Facility: CLINIC | Age: 83
End: 2023-02-13
Payer: COMMERCIAL

## 2023-02-13 VITALS
HEART RATE: 52 BPM | SYSTOLIC BLOOD PRESSURE: 128 MMHG | BODY MASS INDEX: 32.58 KG/M2 | RESPIRATION RATE: 16 BRPM | DIASTOLIC BLOOD PRESSURE: 78 MMHG | OXYGEN SATURATION: 96 % | WEIGHT: 181 LBS

## 2023-02-13 DIAGNOSIS — I48.91 ATRIAL FIBRILLATION, UNSPECIFIED TYPE (H): ICD-10-CM

## 2023-02-13 PROCEDURE — 99214 OFFICE O/P EST MOD 30 MIN: CPT | Performed by: INTERNAL MEDICINE

## 2023-02-13 NOTE — LETTER
2/13/2023    YO PALUMBO  [facility] [address] [facility] [address]    RE: Jodee Johnson       Dear Colleague,     I had the pleasure of seeing [facility].    HEART CARE ENCOUNTER CONSULTATON NOTE      [facility]  [phone]      Assessment/Recommendations   Assessment/Plan:  1.  Persistent atrial fibrillation with periods of trend towards bradycardia.  I very much appreciate the input from my EP colleague Dr. Hobson.  There is no current recommendation for pacemaker although discussed the importance of monitoring for complaints of increasing dizziness or lightheadedness.  She had pauses on recent monitor upwards of 4.1 seconds.  This has been felt to be asymptomatic.  We discussed the risk of stroke versus the risk of bleeding.  She has a chads vascular score of 6 and has bled score of approximately 5.  She has a history of prior hemorrhagic stroke.  She has a higher risk of falls.  She is in agreement with respect to referral to the Watchman device which we spent some time discussing today.    2.  EKG changes on the most recent EKG during our visit in December without associated symptoms of chest discomfort.  Nuclear stress test completed January 5 was negative for inducible ischemia with normal left ventricular function.    3.  Dyslipidemia.  Most recent lipids are from October 2022 at which time total cholesterol was 123, triglycerides 123, LDL 55.    4.  Daytime sleepiness.  Likely multifactorial in part related to her psychiatric medications.  Her  recalls that she did have a prior sleep study.  I has not been able to locate this in the outside chart records.  She has an upcoming follow-up with her primary care provider and I did ask that they discuss this further with her primary care provider.      Plan.  Referral for Watchman device given higher risk of bleeding.  Discussed avoidance of nonsteroidal  anti-inflammatory medications.  She does have listed Excedrin Migraine which I would recommend she avoid in favor of Tylenol.  Patient to discuss with her primary care physician prior history of sleep apnea evaluation.  I would appreciate an update.  Patient to monitor for symptoms of dizziness or lightheadedness and update us as soon as possible for development of symptoms.  Follow-up in 3 to 4 months.       History of Present Illness/Subjective    HPI: Jodee Johnson is a 82 year old female who is seen in follow-up.  I appreciate recent notes from my EP colleagues.  She has a history of hypertension, persistent atrial fibrillation and hyperlipidemia.  She wore an event monitor recently that did demonstrate pauses upwards of 4 seconds.  She has a history of hemorrhagic stroke in 2015 and does have some issues with chronic gait instability.  She has not noted any additional bleeding on Eliquis.  I had previously discussed the possibility of a Watchman device which was again discussed with my EP colleague February 8, 2023 and was referred for watchman consideration.  No recommendations for pacemaker at this time with recommendations for close monitoring.  She did have persistent nonspecific ST-T changes on EKG when we visited in December which prompted nuclear stress testing which was negative for ischemia.    She has no specific cardiovascular complaints.  Specifically denying any chest pain, shortness of breath, dizziness or lightheadedness.  Her  does indicate that she feels tired during the day.  Reportedly through her outside clinic she has had a prior sleep evaluation but I could not locate the results.  She will be following up with her primary care physician in the near future and I have asked that they discuss this question with her as well.  Today I reviewed the findings of the event monitor and recommendations from my EP colleagues.  We spent some time talking about the Watchman device.  I indicated  that I am concerned that she does have a higher risk of bleeding given prior history of hemorrhagic stroke and some gait instability is also a higher risk of stroke without measures to prevent stroke from atrial fibrillation.  I reviewed the Watchman device procedure in detail as well.    Recent Echocardiogram Results:      Recent Coronary Angiogram Results:    Cardiac Electrophysiology  Mobile cardiac telemetry monitor report     Mobile cardiac telemetry monitoring from 1/5/2023 to 1/18/2023 (monitored duration 10d 11h 3m).  Continuous atrial fibrillation, ventricular rates 21 (1/6/2023 04:54am) to 132bpm, average 59bpm.  60 pauses of over 2.0s, longest 4.1s (1/9/2023 21:46) - these were noted during daytime and nighttime hours.  Narrow QRS.    Rare premature ventricular contractions (<1%).  Symptom triggers (1, other) correlated to atrial fibrillation with ventricular rate 70bpm.     Electronically signed by Michael Root MD  1/25/2023  1:53 PM     NM Lexiscan stress test  Order: [id number]   Status: Final result      Visible to patient: No (inaccessible in MyChart)      Next appt: 02/13/2023 at 12:50 PM in Cardiology (Audie Pena MD)      Dx: Nonspecific ST-T wave electrocardiogr...      3 Result Notes  Details    Reading Physician Reading Date Result Priority   Wes Veronica DO  [phone] 1/5/2023 Routine   Wes Veronica DO  [phone] 1/5/2023      Result Text        The nuclear stress test is negative for inducible myocardial ischemia or infarction.     Left ventricular function is normal. The left ventricular ejection fraction at rest is 65%.     A prior study was conducted on 5/9/2014.  Prior images were unavailable for comparison review.        A prior study was conducted on 5/9/2014.  Prior images were unavailable for comparison review.            Atrial fibrillation   Marked ST abnormality, possible inferior subendocardial injury   Abnormal ECG   When compared with  ECG of 13-MAY-2022 13:55,   Significant changes have occurred   Confirmed by ALESSANDRA PATE MD LOC:JN [id number]) on 12/20/2022 3:31:19 PM  Sinus bradycardia   Leftward axis   Otherwise normal ECG   When compared with ECG of 22-DEC-2021 14:09,   Sinus rhythm has replaced Atrial fibrillation   Confirmed by JAXSON RUBIO MD LOC:JN ([id number]) on 5/16/2022 11:28:32 AM           Physical Examination  Review of Systems   Vitals: 128/78, weight 181 pounds, heart rate of 52-64 and regular with occasional extrasystolic complexes.  Wt Readings from Last 3 Encounters:   02/08/23 82.6 kg (182 lb)   12/19/22 83 kg (183 lb)   05/13/22 84.4 kg (186 lb)       General Appearance:   no distress, normal body habitus   ENT/Mouth:  Wearing a facemask   EYES:  no scleral icterus, normal conjunctivae   Neck: no carotid bruits or thyromegaly   Chest/Lungs:   lungs are clear to auscultation, no rales or wheezingequal chest wall expansion    Cardiovascular:    Regularly irregular. Normal first and second heart sounds with soft systolic murmur, rubs, or gallops; the carotid, radial and posterior tibial pulses are intact, Jugular venous pressure not obviously increased although examined in the chair, no significant edema bilaterally    Abdomen:  no organomegaly, masses, bruits, or tenderness; bowel sounds are present   Extremities: no cyanosis or clubbing   Skin: no xanthelasma, warm.    Neurologic:  no tremors     Psychiatric: alert and oriented x3, calm        Please refer above for cardiac ROS details.        Medical History  Surgical History Family History Social History   Past Medical History:   Diagnosis Date     Atrial fibrillation (H)      Cancer (H)     breast     Depression      Hemorrhagic cerebrovascular accident (CVA) (H)     due to HTN?     Hyperlipidemia      Hypertension      Hypotension, unspecified hypotension type 8/17/2021     Hypothyroidism      Restless leg syndrome      Weight loss 8/17/2021     Past Surgical History:    Procedure Laterality Date     LUMPECTOMY BREAST  01/20/2022     No family history on file.     Social History     Socioeconomic History     Marital status: Single     Spouse name: Not on file     Number of children: Not on file     Years of education: Not on file     Highest education level: Not on file   Occupational History     Not on file   Tobacco Use     Smoking status: Former     Packs/day: 0.00     Types: Cigarettes     Smokeless tobacco: Never   Substance and Sexual Activity     Alcohol use: Not on file     Drug use: Not on file     Sexual activity: Not on file   Other Topics Concern     Parent/sibling w/ CABG, MI or angioplasty before 65F 55M? Not Asked   Social History Narrative     Not on file     Social Determinants of Health     Financial Resource Strain: Not on file   Food Insecurity: Not on file   Transportation Needs: Not on file   Physical Activity: Not on file   Stress: Not on file   Social Connections: Not on file   Intimate Partner Violence: Not on file   Housing Stability: Not on file           Medications  Allergies   Current Outpatient Medications   Medication Sig Dispense Refill     acetaminophen (TYLENOL) 500 MG tablet Take 500-1,000 mg by mouth every 6 hours as needed for pain       apixaban ANTICOAGULANT (ELIQUIS) 5 MG tablet Take 1 tablet (5 mg) by mouth 2 times daily 180 tablet 4     ARIPiprazole (ABILIFY) 5 MG tablet Take 5 mg by mouth every morning       aspirin-acetaminophen-caffeine (EXCEDRIN MIGRAINE) 250-250-65 MG tablet Take 1 tablet by mouth every 4 hours as needed for headaches       cholecalciferol, vitamin D3, 50 mcg (2,000 unit) capsule [CHOLECALCIFEROL, VITAMIN D3, 50 MCG (2,000 UNIT) CAPSULE] Take 1 capsule by mouth daily.       cloNIDine HCL (CATAPRES) 0.1 MG tablet Take 1 tablet by mouth 2 times daily Hold if BP <100/60       cyanocobalamin 1000 MCG tablet [CYANOCOBALAMIN 1000 MCG TABLET] Take 1,000 mcg by mouth daily.       divalproex sodium delayed-release (DEPAKOTE)  250 MG DR tablet Take 250 mg by mouth 2 times daily       DULoxetine HCl 40 MG CPEP Take 40 mg by mouth daily       estradiol (ESTRACE) 0.1 MG/GM vaginal cream        exemestane (AROMASIN) 25 MG tablet Take 25 mg by mouth daily       levothyroxine (SYNTHROID/LEVOTHROID) 137 MCG tablet Take 68.5 mcg by mouth daily       losartan (COZAAR) 25 MG tablet Take 1 tablet by mouth daily at 2 pm       Melatonin 10 MG CAPS Take 10 mg by mouth nightly as needed for sleep       memantine (NAMENDA) 5 MG tablet Take 2.5 mg by mouth 2 times daily       potassium chloride ER (KLOR-CON M) 20 MEQ CR tablet Take 20 mEq by mouth daily       rosuvastatin (CRESTOR) 5 MG tablet Take 5 mg by mouth At Bedtime        traZODone (DESYREL) 50 MG tablet Take 50 mg by mouth At Bedtime         Allergies   Allergen Reactions     Atenolol Unknown     Other reaction(s): Bradycardia, Other reaction(s): Bradycardia     Hydrochlorothiazide Unknown     Other reaction(s): Hypercalcemia, Other reaction(s): Hypercalcemia     Gabapentin Other (See Comments)     Other reaction(s): Confusion, Other reaction(s): Insomnia, Unknown, insomnia, insomnia     Lisinopril Nausea     Other reaction(s): GI Upset, Other reaction(s): Abdominal Pain     Nortriptyline Unknown     Oxycodone Itching     Paroxetine Unknown     Other reaction(s): Drowsiness, Sedation, Other reaction(s): Sedation     Tetracycline Photosensitivity and Rash     Amlodipine Unknown     Other reaction(s): GI Upset, Vomiting, Other reaction(s): Abdominal Pain     Codeine Itching     Tolerates tylenol 3 at home per pt     Oxycodone-Acetaminophen Itching     Sertraline Unknown     Other reaction(s): Sedation, Other reaction(s): Sedation     Diphenhydramine Anxiety     Other reaction(s): Agitation, Other reaction(s): Unknown          Lab Results    Chemistry/lipid CBC Cardiac Enzymes/BNP/TSH/INR   No results for input(s): CHOL, HDL, LDL, TRIG, CHOLHDLRATIO in the last 68072 hours.  No results for input(s):  LDL in the last 18378 hours.  Recent Labs   Lab Test 02/17/22  1408      POTASSIUM 4.2   CHLORIDE 107   CO2 27   GLC 94   BUN 17   CR 0.74   GFRESTIMATED 81   DANTE 10.1     Recent Labs   Lab Test 02/17/22  1408   CR 0.74     No results for input(s): A1C in the last 52293 hours.       No results for input(s): WBC, HGB, HCT, MCV, PLT in the last 05670 hours.  No results for input(s): HGB in the last 15255 hours. No results for input(s): TROPONINI in the last 64459 hours.  No results for input(s): BNP, NTBNPI, NTBNP in the last 90554 hours.  No results for input(s): TSH in the last 86804 hours.  No results for input(s): INR in the last 12846 hours.     Audie Pena MD    Thank you for allowing me to participate in the care of your patient.      Sincerely,     Audie Pena MD     [facility]  cc:   Audie Pena MD  [address] [facility]  [address]  [place] [address]

## 2023-02-13 NOTE — PATIENT INSTRUCTIONS
Please ask Dr Mandujano what your sleep study history is and ask her to connect with me about the details.Kristy is my nurse and her number is [phone].Please call me with symptoms of dizziness or lighheadness.

## 2023-02-13 NOTE — TELEPHONE ENCOUNTER
Vernon called - pt had sleep eval 9/27/21 at [facility].    Chart reviewed - I don't see any sleep eval 9/27/21 at [facility].  Found old sleep study from [facility] that was done 11/05/10, scanned 11/18/10.    Dr Pena updated.  -[facility]

## 2023-02-14 NOTE — TELEPHONE ENCOUNTER
----- Message -----  From: Audie Pena MD  Sent: 2/13/2023   4:54 PM CST  To: Kristy Lomeli RN    She will be seeing her primary care physician soon maybe she could help shed light on this.

## 2023-03-01 ENCOUNTER — OFFICE VISIT (OUTPATIENT)
Dept: CARDIOLOGY | Facility: CLINIC | Age: 83
End: 2023-03-01
Payer: COMMERCIAL

## 2023-03-01 VITALS
HEART RATE: 50 BPM | SYSTOLIC BLOOD PRESSURE: 114 MMHG | DIASTOLIC BLOOD PRESSURE: 78 MMHG | RESPIRATION RATE: 14 BRPM | WEIGHT: 180 LBS | BODY MASS INDEX: 32.4 KG/M2

## 2023-03-01 DIAGNOSIS — I48.19 PERSISTENT ATRIAL FIBRILLATION (H): Primary | ICD-10-CM

## 2023-03-01 DIAGNOSIS — I10 BENIGN ESSENTIAL HYPERTENSION: ICD-10-CM

## 2023-03-01 DIAGNOSIS — R41.89 COGNITIVE IMPAIRMENT: ICD-10-CM

## 2023-03-01 PROCEDURE — 99214 OFFICE O/P EST MOD 30 MIN: CPT | Performed by: INTERNAL MEDICINE

## 2023-03-01 NOTE — LETTER
3/1/2023    YO PALUMBO  [facility] [address] [address] [facility] [address]    RE: Jodee Johnson       Dear Colleague,     I had the pleasure of seeing Jodee Johnson in the [facility].  HEART CARE ENCOUNTER NOTE       [facility]  [phone]      Assessment/Recommendations   1.  Persistent atrial fibrillation: I have personally reviewed this patient's chart and have spoken with the patient about the treatment options, including ADRIANO device.  She has a [id number]-[id number] score of 6 for age greater than 75, female gender, prior stroke and hypertension.  She has a HAS-BLED score of 3 for age, bleeding disposition and hemorrhagic stroke.   She is not a good candidate for long-term anticoagulation due to prior hemorrhagic stroke, gait imbalance and high fall risk.  She will need screening of her anatomy to ensure that one of the device sizes will fit.  I have recommended CT pulmonary vein study.    If her anatomy is amenable, she can be scheduled.  Once scheduled, the patient will stay on Eliquis up until implant.  After implant, the patient will be changed to aspirin 81 mg daily and Plavix 75 mg daily.  She will take DAPT for 6 months, then stop Plavix and remain on aspirin 81 mg daily, indefinitely.  She will have a GLENN approximately 3 months post-implant.  She understands that the risks of the procedure are <2% and include, but are not limited to device embolization, air embolism, myocardial perforation, device thrombosis, ASD, stroke, or death.  We discussed expected recovery and follow-up.       The patient is a good candidate for proceeding with left atrial appendage screening and implant.  Her questions were answered to her satisfaction.     2.  Hypertension -blood pressure is at goal today.  Patient should continue taking clonidine 0.1 mg twice daily and losartan 25 mg daily    3.  Dyslipidemia with goal LDL less than 100 -controlled on current dose of  rosuvastatin    4.  Cognitive impairment -taking memantine    5.  History of hemorrhagic stroke in 2015 -thought to be secondary to uncontrolled hypertension       History of Present Illness/Subjective    Jodee Johnson is a 82 year old female who comes in today for discussion regarding her interest in left atrial appendage occlusion device.  Her  accompanies her to the visit today.    Jodee Johnson has a past history of persistent atrial fibrillation, hypertension, hyperlipidemia, cognitive impairment, prior hemorrhagic stroke and high fall risk due to gait imbalance.  She has been on Eliquis, but due to multiple concerns has reason to come off of it.  She recently saw both Dr. Pena and Dr. Hobson, who agree with Watchman placement.     Jodee Johnson denies chest discomfort, palpitations, shortness of breath, paroxysmal nocturnal dyspnea, orthopnea, lightheadedness, dizziness, pre-syncope, or syncope.  Jodee Johnson also denies any weight loss, changes in appetite, nausea or vomiting.     Medical, surgical, family, social history, and medications were reviewed and updated as necessary.    Nuclear stress 1/5/23     The nuclear stress test is negative for inducible myocardial ischemia or infarction.     Left ventricular function is normal. The left ventricular ejection fraction at rest is 65%.     A prior study was conducted on 5/9/2014.  Prior images were unavailable for comparison review.        A prior study was conducted on 5/9/2014.  Prior images were unavailable for comparison review.   The nuclear stress test is negative for inducible myocardial ischemia or infarction.     Left ventricular function is normal. The left ventricular ejection fraction at rest is 65%.     A prior study was conducted on 5/9/2014.  Prior images were unavailable for comparison review.        A prior study was conducted on 5/9/2014.  Prior images were unavailable for comparison review.     Physical Examination Review of  Systems   Vitals: /78 (BP Location: Right arm, Patient Position: Sitting, Cuff Size: Adult Regular)   Pulse 50   Resp 14   Wt 81.6 kg (180 lb)   BMI 32.40 kg/m    BMI= Body mass index is 32.4 kg/m .  Wt Readings from Last 3 Encounters:   03/01/23 81.6 kg (180 lb)   02/13/23 82.1 kg (181 lb)   02/08/23 82.6 kg (182 lb)       General Appearance:   Alert, cooperative and in no acute distress   ENT/Mouth: membranes moist, no oral lesions or bleeding gums.      EYES:  no scleral icterus, normal conjunctivae   Neck: Thyroid not visualized   Chest/Lungs:   lungs are clear to auscultation, no rales or wheezing   Cardiovascular:   Irregularly irregular . Normal first and second heart sounds with no murmurs, rubs or gallops; the carotid, radial and posterior tibial pulses are intact, no edema bilaterally    Abdomen:  Soft and nontender. Bowel sounds are present in all quadrants   Extremities: no cyanosis or clubbing   Skin: no xanthelasma, warm.    Neurologic: normal gait, normal  bilateral, no tremors   Psychiatric: Normal mood and affect       Please refer above for cardiac ROS details.      Medical History  Surgical History Family History Social History   Past Medical History:   Diagnosis Date     Atrial fibrillation (H)      Cancer (H)     breast     Depression      Hemorrhagic cerebrovascular accident (CVA) (H)     due to HTN?     Hyperlipidemia      Hypertension      Hypotension, unspecified hypotension type 8/17/2021     Hypothyroidism      Restless leg syndrome      Weight loss 8/17/2021     Past Surgical History:   Procedure Laterality Date     LUMPECTOMY BREAST  01/20/2022     History reviewed. No pertinent family history. Social History     Socioeconomic History     Marital status: Single     Spouse name: Not on file     Number of children: Not on file     Years of education: Not on file     Highest education level: Not on file   Occupational History     Not on file   Tobacco Use     Smoking status:  Former     Packs/day: 0.00     Types: Cigarettes     Smokeless tobacco: Never   Substance and Sexual Activity     Alcohol use: Not on file     Drug use: Not on file     Sexual activity: Not on file   Other Topics Concern     Parent/sibling w/ CABG, MI or angioplasty before 65F 55M? Not Asked   Social History Narrative     Not on file     Social Determinants of Health     Financial Resource Strain: Not on file   Food Insecurity: Not on file   Transportation Needs: Not on file   Physical Activity: Not on file   Stress: Not on file   Social Connections: Not on file   Intimate Partner Violence: Not on file   Housing Stability: Not on file          Medications  Allergies   Current Outpatient Medications   Medication Sig Dispense Refill     acetaminophen (TYLENOL) 500 MG tablet Take 500-1,000 mg by mouth every 6 hours as needed for pain       apixaban ANTICOAGULANT (ELIQUIS) 5 MG tablet Take 1 tablet (5 mg) by mouth 2 times daily 180 tablet 4     ARIPiprazole (ABILIFY) 5 MG tablet Take 5 mg by mouth every morning       aspirin-acetaminophen-caffeine (EXCEDRIN MIGRAINE) 250-250-65 MG tablet Take 1 tablet by mouth every 4 hours as needed for headaches       cephALEXin (KEFLEX) 250 MG capsule Take 250 mg by mouth daily       cholecalciferol, vitamin D3, 50 mcg (2,000 unit) capsule [CHOLECALCIFEROL, VITAMIN D3, 50 MCG (2,000 UNIT) CAPSULE] Take 1 capsule by mouth daily.       cloNIDine HCL (CATAPRES) 0.1 MG tablet Take 1 tablet by mouth 2 times daily Hold if BP <100/60       cyanocobalamin 1000 MCG tablet [CYANOCOBALAMIN 1000 MCG TABLET] Take 1,000 mcg by mouth daily.       divalproex sodium delayed-release (DEPAKOTE) 250 MG DR tablet Take 250 mg by mouth 2 times daily       DULoxetine HCl 40 MG CPEP Take 40 mg by mouth daily       estradiol (ESTRACE) 0.1 MG/GM vaginal cream        exemestane (AROMASIN) 25 MG tablet Take 25 mg by mouth daily       levothyroxine (SYNTHROID/LEVOTHROID) 137 MCG tablet Take 68.5 mcg by mouth  daily       losartan (COZAAR) 25 MG tablet Take 1 tablet by mouth daily at 2 pm       Melatonin 10 MG CAPS Take 10 mg by mouth nightly as needed for sleep       memantine (NAMENDA) 5 MG tablet Take 2.5 mg by mouth 2 times daily       potassium chloride ER (KLOR-CON M) 20 MEQ CR tablet Take 20 mEq by mouth daily       rosuvastatin (CRESTOR) 5 MG tablet Take 5 mg by mouth At Bedtime        traZODone (DESYREL) 50 MG tablet Take 50 mg by mouth At Bedtime      Allergies   Allergen Reactions     Atenolol Unknown     Other reaction(s): Bradycardia, Other reaction(s): Bradycardia     Hydrochlorothiazide Unknown     Other reaction(s): Hypercalcemia, Other reaction(s): Hypercalcemia     Gabapentin Other (See Comments)     Other reaction(s): Confusion, Other reaction(s): Insomnia, Unknown, insomnia, insomnia     Lisinopril Nausea     Other reaction(s): GI Upset, Other reaction(s): Abdominal Pain     Nortriptyline Unknown     Oxycodone Itching     Paroxetine Unknown     Other reaction(s): Drowsiness, Sedation, Other reaction(s): Sedation     Tetracycline Photosensitivity and Rash     Amlodipine Unknown     Other reaction(s): GI Upset, Vomiting, Other reaction(s): Abdominal Pain     Codeine Itching     Tolerates tylenol 3 at home per pt     Oxycodone-Acetaminophen Itching     Sertraline Unknown     Other reaction(s): Sedation, Other reaction(s): Sedation     Diphenhydramine Anxiety     Other reaction(s): Agitation, Other reaction(s): Unknown         Lab Results    Chemistry/lipid CBC Cardiac Enzymes/BNP/TSH/INR   No results for input(s): CHOL, HDL, LDL, TRIG, CHOLHDLRATIO in the last 93475 hours.  No results for input(s): LDL in the last 55645 hours.  Recent Labs   Lab Test 02/17/22  1408      POTASSIUM 4.2   CHLORIDE 107   CO2 27   GLC 94   BUN 17   CR 0.74   GFRESTIMATED 81   DANTE 10.1     Recent Labs   Lab Test 02/17/22  1408   CR 0.74     No results for input(s): A1C in the last 68389 hours. No results for input(s):  WBC, HGB, HCT, MCV, PLT in the last 12011 hours.  No results for input(s): HGB in the last 95882 hours. No results for input(s): TROPONINI in the last 04810 hours.  No results for input(s): BNP, NTBNPI, NTBNP in the last 72488 hours.  No results for input(s): TSH in the last 51436 hours.  No results for input(s): INR in the last 80151 hours.       This note has been dictated using voice recognition software. Any grammatical or context distortions are unintentional and inherent to the software.          Thank you for allowing me to participate in the care of your patient.      Sincerely,     Marissa Day PA-C     [facility]  cc:   No referring provider defined for this encounter.

## 2023-03-01 NOTE — PATIENT INSTRUCTIONS
Jodee Johnson,    It was a pleasure to see you today in the clinic regarding your interest in the Watchman device.     My recommendations after this visit include:     - have a CT of your heart to make sure your left atrial appendage will fit one of the device sizes      If you have questions or concerns, please call using the numbers below:                Mary Huizar RN  [phone]    Rula Pimentel RN  [phone]

## 2023-03-02 NOTE — PROGRESS NOTES
HEART CARE ENCOUNTER NOTE       [facility]  [phone]      Assessment/Recommendations   1.  Persistent atrial fibrillation: I have personally reviewed this patient's chart and have spoken with the patient about the treatment options, including ADRIANO device.  She has a [id number]-GUPq score of 6 for age greater than 75, female gender, prior stroke and hypertension.  She has a HAS-BLED score of 3 for age, bleeding disposition and hemorrhagic stroke.   She is not a good candidate for long-term anticoagulation due to prior hemorrhagic stroke, gait imbalance and high fall risk.  She will need screening of her anatomy to ensure that one of the device sizes will fit.  I have recommended CT pulmonary vein study.    If her anatomy is amenable, she can be scheduled.  Once scheduled, the patient will stay on Eliquis up until implant.  After implant, the patient will be changed to aspirin 81 mg daily and Plavix 75 mg daily.  She will take DAPT for 6 months, then stop Plavix and remain on aspirin 81 mg daily, indefinitely.  She will have a GLENN approximately 3 months post-implant.  She understands that the risks of the procedure are <2% and include, but are not limited to device embolization, air embolism, myocardial perforation, device thrombosis, ASD, stroke, or death.  We discussed expected recovery and follow-up.       The patient is a good candidate for proceeding with left atrial appendage screening and implant.  Her questions were answered to her satisfaction.     2.  Hypertension -blood pressure is at goal today.  Patient should continue taking clonidine 0.1 mg twice daily and losartan 25 mg daily    3.  Dyslipidemia with goal LDL less than 100 -controlled on current dose of rosuvastatin    4.  Cognitive impairment -taking memantine    5.  History of hemorrhagic stroke in 2015 -thought to be secondary to uncontrolled hypertension       History of Present Illness/Subjective    Jodee Johnson is a 82 year  old female who comes in today for discussion regarding her interest in left atrial appendage occlusion device.  Her  accompanies her to the visit today.    Jodee Johnson has a past history of persistent atrial fibrillation, hypertension, hyperlipidemia, cognitive impairment, prior hemorrhagic stroke and high fall risk due to gait imbalance.  She has been on Eliquis, but due to multiple concerns has reason to come off of it.  She recently saw both Dr. Pena and Dr. Hobson, who agree with Watchman placement.     Jodee Johnson denies chest discomfort, palpitations, shortness of breath, paroxysmal nocturnal dyspnea, orthopnea, lightheadedness, dizziness, pre-syncope, or syncope.  Jodee Johnson also denies any weight loss, changes in appetite, nausea or vomiting.     Medical, surgical, family, social history, and medications were reviewed and updated as necessary.    Nuclear stress 1/5/23     The nuclear stress test is negative for inducible myocardial ischemia or infarction.     Left ventricular function is normal. The left ventricular ejection fraction at rest is 65%.     A prior study was conducted on 5/9/2014.  Prior images were unavailable for comparison review.        A prior study was conducted on 5/9/2014.  Prior images were unavailable for comparison review.   The nuclear stress test is negative for inducible myocardial ischemia or infarction.     Left ventricular function is normal. The left ventricular ejection fraction at rest is 65%.     A prior study was conducted on 5/9/2014.  Prior images were unavailable for comparison review.        A prior study was conducted on 5/9/2014.  Prior images were unavailable for comparison review.     Physical Examination Review of Systems   Vitals: /78 (BP Location: Right arm, Patient Position: Sitting, Cuff Size: Adult Regular)   Pulse 50   Resp 14   Wt 81.6 kg (180 lb)   BMI 32.40 kg/m    BMI= Body mass index is 32.4 kg/m .  Wt Readings from Last 3  Encounters:   03/01/23 81.6 kg (180 lb)   02/13/23 82.1 kg (181 lb)   02/08/23 82.6 kg (182 lb)       General Appearance:   Alert, cooperative and in no acute distress   ENT/Mouth: membranes moist, no oral lesions or bleeding gums.      EYES:  no scleral icterus, normal conjunctivae   Neck: Thyroid not visualized   Chest/Lungs:   lungs are clear to auscultation, no rales or wheezing   Cardiovascular:   Irregularly irregular . Normal first and second heart sounds with no murmurs, rubs or gallops; the carotid, radial and posterior tibial pulses are intact, no edema bilaterally    Abdomen:  Soft and nontender. Bowel sounds are present in all quadrants   Extremities: no cyanosis or clubbing   Skin: no xanthelasma, warm.    Neurologic: normal gait, normal  bilateral, no tremors   Psychiatric: Normal mood and affect       Please refer above for cardiac ROS details.      Medical History  Surgical History Family History Social History   Past Medical History:   Diagnosis Date     Atrial fibrillation (H)      Cancer (H)     breast     Depression      Hemorrhagic cerebrovascular accident (CVA) (H)     due to HTN?     Hyperlipidemia      Hypertension      Hypotension, unspecified hypotension type 8/17/2021     Hypothyroidism      Restless leg syndrome      Weight loss 8/17/2021     Past Surgical History:   Procedure Laterality Date     LUMPECTOMY BREAST  01/20/2022     History reviewed. No pertinent family history. Social History     Socioeconomic History     Marital status: Single     Spouse name: Not on file     Number of children: Not on file     Years of education: Not on file     Highest education level: Not on file   Occupational History     Not on file   Tobacco Use     Smoking status: Former     Packs/day: 0.00     Types: Cigarettes     Smokeless tobacco: Never   Substance and Sexual Activity     Alcohol use: Not on file     Drug use: Not on file     Sexual activity: Not on file   Other Topics Concern      Parent/sibling w/ CABG, MI or angioplasty before 65F 55M? Not Asked   Social History Narrative     Not on file     Social Determinants of Health     Financial Resource Strain: Not on file   Food Insecurity: Not on file   Transportation Needs: Not on file   Physical Activity: Not on file   Stress: Not on file   Social Connections: Not on file   Intimate Partner Violence: Not on file   Housing Stability: Not on file          Medications  Allergies   Current Outpatient Medications   Medication Sig Dispense Refill     acetaminophen (TYLENOL) 500 MG tablet Take 500-1,000 mg by mouth every 6 hours as needed for pain       apixaban ANTICOAGULANT (ELIQUIS) 5 MG tablet Take 1 tablet (5 mg) by mouth 2 times daily 180 tablet 4     ARIPiprazole (ABILIFY) 5 MG tablet Take 5 mg by mouth every morning       aspirin-acetaminophen-caffeine (EXCEDRIN MIGRAINE) 250-250-65 MG tablet Take 1 tablet by mouth every 4 hours as needed for headaches       cephALEXin (KEFLEX) 250 MG capsule Take 250 mg by mouth daily       cholecalciferol, vitamin D3, 50 mcg (2,000 unit) capsule [CHOLECALCIFEROL, VITAMIN D3, 50 MCG (2,000 UNIT) CAPSULE] Take 1 capsule by mouth daily.       cloNIDine HCL (CATAPRES) 0.1 MG tablet Take 1 tablet by mouth 2 times daily Hold if BP <100/60       cyanocobalamin 1000 MCG tablet [CYANOCOBALAMIN 1000 MCG TABLET] Take 1,000 mcg by mouth daily.       divalproex sodium delayed-release (DEPAKOTE) 250 MG DR tablet Take 250 mg by mouth 2 times daily       DULoxetine HCl 40 MG CPEP Take 40 mg by mouth daily       estradiol (ESTRACE) 0.1 MG/GM vaginal cream        exemestane (AROMASIN) 25 MG tablet Take 25 mg by mouth daily       levothyroxine (SYNTHROID/LEVOTHROID) 137 MCG tablet Take 68.5 mcg by mouth daily       losartan (COZAAR) 25 MG tablet Take 1 tablet by mouth daily at 2 pm       Melatonin 10 MG CAPS Take 10 mg by mouth nightly as needed for sleep       memantine (NAMENDA) 5 MG tablet Take 2.5 mg by mouth 2 times daily        potassium chloride ER (KLOR-CON M) 20 MEQ CR tablet Take 20 mEq by mouth daily       rosuvastatin (CRESTOR) 5 MG tablet Take 5 mg by mouth At Bedtime        traZODone (DESYREL) 50 MG tablet Take 50 mg by mouth At Bedtime      Allergies   Allergen Reactions     Atenolol Unknown     Other reaction(s): Bradycardia, Other reaction(s): Bradycardia     Hydrochlorothiazide Unknown     Other reaction(s): Hypercalcemia, Other reaction(s): Hypercalcemia     Gabapentin Other (See Comments)     Other reaction(s): Confusion, Other reaction(s): Insomnia, Unknown, insomnia, insomnia     Lisinopril Nausea     Other reaction(s): GI Upset, Other reaction(s): Abdominal Pain     Nortriptyline Unknown     Oxycodone Itching     Paroxetine Unknown     Other reaction(s): Drowsiness, Sedation, Other reaction(s): Sedation     Tetracycline Photosensitivity and Rash     Amlodipine Unknown     Other reaction(s): GI Upset, Vomiting, Other reaction(s): Abdominal Pain     Codeine Itching     Tolerates tylenol 3 at home per pt     Oxycodone-Acetaminophen Itching     Sertraline Unknown     Other reaction(s): Sedation, Other reaction(s): Sedation     Diphenhydramine Anxiety     Other reaction(s): Agitation, Other reaction(s): Unknown         Lab Results    Chemistry/lipid CBC Cardiac Enzymes/BNP/TSH/INR   No results for input(s): CHOL, HDL, LDL, TRIG, CHOLHDLRATIO in the last 59493 hours.  No results for input(s): LDL in the last 12418 hours.  Recent Labs   Lab Test 02/17/22  1408      POTASSIUM 4.2   CHLORIDE 107   CO2 27   GLC 94   BUN 17   CR 0.74   GFRESTIMATED 81   DANTE 10.1     Recent Labs   Lab Test 02/17/22  1408   CR 0.74     No results for input(s): A1C in the last 14212 hours. No results for input(s): WBC, HGB, HCT, MCV, PLT in the last 50565 hours.  No results for input(s): HGB in the last 18345 hours. No results for input(s): TROPONINI in the last 59435 hours.  No results for input(s): BNP, NTBNPI, NTBNP in the last 76107  hours.  No results for input(s): TSH in the last 67363 hours.  No results for input(s): INR in the last 26213 hours.       This note has been dictated using voice recognition software. Any grammatical or context distortions are unintentional and inherent to the software.

## 2023-03-03 ENCOUNTER — TELEPHONE (OUTPATIENT)
Dept: CARDIOLOGY | Facility: CLINIC | Age: 83
End: 2023-03-03
Payer: COMMERCIAL

## 2023-03-03 DIAGNOSIS — I48.19 PERSISTENT ATRIAL FIBRILLATION (H): Primary | ICD-10-CM

## 2023-03-03 NOTE — TELEPHONE ENCOUNTER
for return call. [facility]    ----- Message from Marissa Day PA-C sent at 3/2/2023  1:38 PM CST -----  She wants to move forward.  Okay to order CT pulmonary vein study for screening

## 2023-03-03 NOTE — TELEPHONE ENCOUNTER
Incoming call from patient and . Informed them of need for CT scan to evaluate ADRIANO anatomy for suitability for LAAC device. Informed them writer will place order and have scheduling contact them to arrange for CT at [facility]. Verbalized understanding, no further questions at this time. GEM

## 2023-03-09 NOTE — TELEPHONE ENCOUNTER
Routed to scheduling. [facility]    ----- Message -----  From: Kristy Lomeli RN  Sent: 3/9/2023   2:13 PM CST  To: [facility] Left Atrial Appendage Closure Pool - e    Vernon called - haven't heard about scheduling CT yet.    Thank you,  Kristy Bull RN

## 2023-03-09 NOTE — TELEPHONE ENCOUNTER
----- Message -----  From: Sherie Schilling  Sent: 3/9/2023   2:41 PM CST  To: Mary Huizar RN    3/18      ----- Message from Mary Huizar RN sent at 3/3/2023 11:10 AM CST -----  Please call for pre-LAAC CT. I messaged Jean to addend his office visit, thanks!    Mary

## 2023-03-17 ENCOUNTER — ANCILLARY ORDERS (OUTPATIENT)
Dept: CARDIOLOGY | Facility: CLINIC | Age: 83
End: 2023-03-17

## 2023-03-17 DIAGNOSIS — I48.19 PERSISTENT ATRIAL FIBRILLATION (H): ICD-10-CM

## 2023-03-18 ENCOUNTER — HOSPITAL ENCOUNTER (OUTPATIENT)
Dept: CT IMAGING | Facility: CLINIC | Age: 83
Discharge: HOME | End: 2023-03-18
Attending: INTERNAL MEDICINE | Admitting: INTERNAL MEDICINE
Payer: COMMERCIAL

## 2023-03-18 DIAGNOSIS — I48.19 PERSISTENT ATRIAL FIBRILLATION (H): ICD-10-CM

## 2023-03-18 LAB
CREAT BLD-MCNC: 1 MG/DL (ref 0.6–1.1)
GFR SERPL CREATININE-BSD FRML MDRD: 56 ML/MIN/1.73M2

## 2023-03-18 PROCEDURE — 82565 ASSAY OF CREATININE: CPT

## 2023-03-18 PROCEDURE — 250N000011 HC RX IP 250 OP 636: Performed by: INTERNAL MEDICINE

## 2023-03-18 PROCEDURE — 75572 CT HRT W/3D IMAGE: CPT

## 2023-03-18 PROCEDURE — 75572 CT HRT W/3D IMAGE: CPT | Mod: 26 | Performed by: INTERNAL MEDICINE

## 2023-03-18 RX ORDER — IOPAMIDOL 755 MG/ML
120 INJECTION, SOLUTION INTRAVASCULAR ONCE
Status: COMPLETED | OUTPATIENT
Start: 2023-03-18 | End: 2023-03-18

## 2023-03-18 RX ADMIN — IOPAMIDOL 120 ML: 755 INJECTION, SOLUTION INTRAVENOUS at 07:57

## 2023-03-21 LAB — BSA FOR ECHO PROCEDURE: 1.8 M2

## 2023-03-24 ENCOUNTER — TELEPHONE (OUTPATIENT)
Dept: CARDIOLOGY | Facility: CLINIC | Age: 83
End: 2023-03-24
Payer: COMMERCIAL

## 2023-03-24 NOTE — TELEPHONE ENCOUNTER
Incoming call from Patients  asking about results of CT scan. Explained to  that we are waiting for implanting providers to review CT results and will call as soon as we have information. He had no further questions. -SC

## 2023-03-28 ENCOUNTER — TELEPHONE (OUTPATIENT)
Dept: CARDIOLOGY | Facility: CLINIC | Age: 83
End: 2023-03-28
Payer: COMMERCIAL

## 2023-03-28 DIAGNOSIS — I48.19 PERSISTENT ATRIAL FIBRILLATION (H): Primary | ICD-10-CM

## 2023-03-28 NOTE — TELEPHONE ENCOUNTER
for return call. [facility]    ----- Message from Clay Walker MD sent at 3/27/2023  2:03 PM CDT -----  [id number]. Okay to proceed w/ LAAO attempt.  Thx!  Clay  ----- Message -----  From: Mary Huizar RN  Sent: 3/21/2023   3:52 PM CDT  To: Mary Huizar RN, Clay Walker MD, #    Please review patient pre-LAAC CT.    Ok to schedule patient for LAAC procedure? Thanks!    Mary GONZALEZ RN

## 2023-03-28 NOTE — LETTER
March 28, 2023      TO: Jodee Johnson  [address] [place] [id number]         Dear Ms. Jodee Johnson,    ***      Sincerely,      Mary Huizar {[id number]}

## 2023-03-28 NOTE — TELEPHONE ENCOUNTER
Phone call to patient to discuss results of pre-LAAC CT. Informed patient of suitable anatomy for LAAC procedure. Discussed pre and post procedure expectations, including appts the week of the procedure, need for responsible adult at home the night of the procedure, driving restrictions post-procedure, and 6 week post-LAAC appt, and 3 month post-LAAC GLENN. Patient agreeable to plan moving forward. Would like to have LAAC procedure 5/11/2023 with Dr. Hobson. Informed patient scheduling will be in touch to finalize procedure appointments but that they may return call to writer at their leisure. Patient has writer's direct office number for further questions or concerns. No further questions or concerns at this time.

## 2023-03-31 ENCOUNTER — TELEPHONE (OUTPATIENT)
Dept: CARDIOLOGY | Facility: CLINIC | Age: 83
End: 2023-03-31
Payer: COMMERCIAL

## 2023-03-31 NOTE — TELEPHONE ENCOUNTER
Addressed in alternate encounter. Patient scheduled for LAAC 4/13/2023 with pre-op already arranged. [facility]    You  Sherie Schilling 3 days ago     LC  Please call patient to schedule pre-ops, post-op, and LAAC procedure 5/11/23 with Dr. Hobson   Patient DOES NOT have a device. SDM done with Dr. Pena 2/13/23   Case request, intra-Op GLENN, and 3 month post-LAAC GLENN orders in.   Thanks!     Mary GONZALEZ RN

## 2023-03-31 NOTE — TELEPHONE ENCOUNTER
Phone call to patient, spoke to  Vernon. Discussed moving patient LAAC procedure day forward to 4/13/2023. Vernon states this will work for him and Jodee. Informed him of pre-op date, location, and time and that patient is NOT to fast for this appt. He verbalized understanding. Encouraged him to return call if there were to be any questions or concerns for patient. No further questions at this time. Writer has contacted financial securing to work case. [facility]

## 2023-04-07 DIAGNOSIS — I48.19 PERSISTENT ATRIAL FIBRILLATION (H): Primary | ICD-10-CM

## 2023-04-09 LAB
ABO/RH(D): NORMAL
ANTIBODY SCREEN: NEGATIVE
SPECIMEN EXPIRATION DATE: NORMAL

## 2023-04-10 ENCOUNTER — LAB (OUTPATIENT)
Dept: CARDIOLOGY | Facility: CLINIC | Age: 83
End: 2023-04-10
Payer: COMMERCIAL

## 2023-04-10 ENCOUNTER — ALLIED HEALTH/NURSE VISIT (OUTPATIENT)
Dept: CARDIOLOGY | Facility: CLINIC | Age: 83
End: 2023-04-10
Payer: COMMERCIAL

## 2023-04-10 ENCOUNTER — DOCUMENTATION ONLY (OUTPATIENT)
Dept: CARDIOLOGY | Facility: CLINIC | Age: 83
End: 2023-04-10

## 2023-04-10 ENCOUNTER — OFFICE VISIT (OUTPATIENT)
Dept: CARDIOLOGY | Facility: CLINIC | Age: 83
End: 2023-04-10
Payer: COMMERCIAL

## 2023-04-10 ENCOUNTER — PREP FOR PROCEDURE (OUTPATIENT)
Dept: CARDIOLOGY | Facility: CLINIC | Age: 83
End: 2023-04-10

## 2023-04-10 VITALS
HEART RATE: 88 BPM | RESPIRATION RATE: 14 BRPM | SYSTOLIC BLOOD PRESSURE: 84 MMHG | DIASTOLIC BLOOD PRESSURE: 57 MMHG | WEIGHT: 179 LBS | BODY MASS INDEX: 32.22 KG/M2

## 2023-04-10 DIAGNOSIS — I48.19 PERSISTENT ATRIAL FIBRILLATION (H): ICD-10-CM

## 2023-04-10 DIAGNOSIS — I48.19 PERSISTENT ATRIAL FIBRILLATION (H): Primary | ICD-10-CM

## 2023-04-10 DIAGNOSIS — Z86.79 HISTORY OF CEREBRAL HEMORRHAGE: ICD-10-CM

## 2023-04-10 DIAGNOSIS — E78.5 DYSLIPIDEMIA: ICD-10-CM

## 2023-04-10 DIAGNOSIS — I49.5 SINUS NODE DYSFUNCTION (H): ICD-10-CM

## 2023-04-10 DIAGNOSIS — I10 BENIGN ESSENTIAL HYPERTENSION: ICD-10-CM

## 2023-04-10 LAB
ANION GAP SERPL CALCULATED.3IONS-SCNC: 9 MMOL/L (ref 7–15)
ATRIAL RATE - MUSE: 101 BPM
BUN SERPL-MCNC: 17.5 MG/DL (ref 8–23)
CALCIUM SERPL-MCNC: 10.1 MG/DL (ref 8.8–10.2)
CHLORIDE SERPL-SCNC: 104 MMOL/L (ref 98–107)
CREAT SERPL-MCNC: 0.89 MG/DL (ref 0.51–0.95)
DEPRECATED HCO3 PLAS-SCNC: 29 MMOL/L (ref 22–29)
DIASTOLIC BLOOD PRESSURE - MUSE: NORMAL MMHG
ERYTHROCYTE [DISTWIDTH] IN BLOOD BY AUTOMATED COUNT: 13.3 % (ref 10–15)
GFR SERPL CREATININE-BSD FRML MDRD: 64 ML/MIN/1.73M2
GLUCOSE SERPL-MCNC: 129 MG/DL (ref 70–99)
HCT VFR BLD AUTO: 48.9 % (ref 35–47)
HGB BLD-MCNC: 15.9 G/DL (ref 11.7–15.7)
INTERPRETATION ECG - MUSE: NORMAL
MCH RBC QN AUTO: 31.1 PG (ref 26.5–33)
MCHC RBC AUTO-ENTMCNC: 32.5 G/DL (ref 31.5–36.5)
MCV RBC AUTO: 96 FL (ref 78–100)
P AXIS - MUSE: NORMAL DEGREES
PLATELET # BLD AUTO: 243 10E3/UL (ref 150–450)
POTASSIUM SERPL-SCNC: 3.9 MMOL/L (ref 3.4–5.3)
PR INTERVAL - MUSE: NORMAL MS
QRS DURATION - MUSE: 74 MS
QT - MUSE: 350 MS
QTC - MUSE: 423 MS
R AXIS - MUSE: 2 DEGREES
RBC # BLD AUTO: 5.12 10E6/UL (ref 3.8–5.2)
SODIUM SERPL-SCNC: 142 MMOL/L (ref 136–145)
SYSTOLIC BLOOD PRESSURE - MUSE: NORMAL MMHG
T AXIS - MUSE: 206 DEGREES
VENTRICULAR RATE- MUSE: 88 BPM
WBC # BLD AUTO: 7.5 10E3/UL (ref 4–11)

## 2023-04-10 PROCEDURE — 36415 COLL VENOUS BLD VENIPUNCTURE: CPT

## 2023-04-10 PROCEDURE — 86900 BLOOD TYPING SEROLOGIC ABO: CPT

## 2023-04-10 PROCEDURE — 93000 ELECTROCARDIOGRAM COMPLETE: CPT | Performed by: INTERNAL MEDICINE

## 2023-04-10 PROCEDURE — 80048 BASIC METABOLIC PNL TOTAL CA: CPT

## 2023-04-10 PROCEDURE — 99207 PR NO CHARGE NURSE ONLY: CPT

## 2023-04-10 PROCEDURE — 86850 RBC ANTIBODY SCREEN: CPT

## 2023-04-10 PROCEDURE — 86901 BLOOD TYPING SEROLOGIC RH(D): CPT

## 2023-04-10 PROCEDURE — 99215 OFFICE O/P EST HI 40 MIN: CPT | Performed by: NURSE PRACTITIONER

## 2023-04-10 PROCEDURE — 85027 COMPLETE CBC AUTOMATED: CPT

## 2023-04-10 RX ORDER — SODIUM CHLORIDE 9 MG/ML
1000 INJECTION, SOLUTION INTRAVENOUS CONTINUOUS
Status: CANCELLED | OUTPATIENT
Start: 2023-04-13

## 2023-04-10 RX ORDER — LIDOCAINE 40 MG/G
CREAM TOPICAL
Status: CANCELLED | OUTPATIENT
Start: 2023-04-10

## 2023-04-10 RX ORDER — CEFAZOLIN SODIUM/WATER 2 G/20 ML
2 SYRINGE (ML) INTRAVENOUS
Status: CANCELLED | OUTPATIENT
Start: 2023-04-13

## 2023-04-10 RX ORDER — ASPIRIN 81 MG/1
81 TABLET ORAL ONCE
Status: CANCELLED | OUTPATIENT
Start: 2023-04-10 | End: 2023-04-10
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
## 2023-04-10 NOTE — PATIENT INSTRUCTIONS
Jodee Johnson,    It was a pleasure to see you today at the [facility].     You are scheduled for left atrial appendage closure via Watchman device with Dr. Davies on 4/13/2023.     Before your procedure:   -Continue your Eliquis as ordered.     After your procedure:   -You should arrange for someone to stay with you for the first 24 hours after discharge  -Make sure you are taking all of your medications as directed in your discharge summary.  Do not stop taking these medications (especially your Plavix/clopidogrel and aspirin) without speaking to your provider.   -No driving for 3 days after your procedure.   -No lifting more than 10 pounds or aggressive exercise for 5 days after your procedure.   -You may shower tomorrow, no bath for 5 days  -Call the office if you start bleeding from the site in your groin, if the site is swollen or tender, if you have a fever, or if you have a growing hard lump at the site.   -Dial 911 if you have any NEW signs or symptoms of a stroke, including but not limited to injuries in vision, problems talking, numbness on one side of your face or body, sudden headache, confusion, or problems with walking.  -Do not hesitate to call the office with additional questions or concerns.     Kristina Black, CNP  Clinical Cardiac Electrophysiology  [facility] and Scheduling [phone]  Left atrial appendage closure nurse coordinators: [phone] for Mary Huizar or [phone] for Rula Pimenetl

## 2023-04-10 NOTE — PROGRESS NOTES
Jodee Johnson  [address]   [place] [address]  [phone] (home)     Patient in to see RN for Pre-LAAC visit on 4/10/2023    All pre-procedure labs drawn: Yes, in process   EKG obtained: Yes, atrial fibrillation   Labs reviewed: In process  Renal Issues: Yes  Diabetic?: No  Device?: No      Pre-procedure instructions  Patient instructed to be NPO after midnight the evening prior to procedure.  Patient instructed to shower the evening before or the morning of the procedure.  Leave all valuables at home (jewlery, rings, watches, large amounts of money).  Patient understands there is one visitor allowed during patients stay. Visitor will need to wear a mask their entire stay and remain in the restricted area per guidelines.   Patient instructed to arrange for transportation home following procedure from a responsible family member of friend. No driving for at least 72 hours post-procedure.  Patient instructed to have a responsible adult with them for 24 hours post-procedure.  Post-procedure follow up process.    Patient instructed on medications:   Take Eliquis, Clonidine, Depakote, Synthroid, Namenda    Aspirin 81mg morning of procedure: To be given in pre-procedure area    Education was given to patient regarding what to expect pre-procedure.     Patient was informed procedure will be done at [facility], [address] [place] [address]. They have been instructed to check in at the  at the Hospital and their arrival time is at 10:00 am    LAAC procedure, GLENN  and blood consent signed at the time of the appt: Yes    All questions were answered to family and patient by RN.    Patient and  Vernon present at the time of appointment.  Vernon will be present day of procedure.    Mary Huizar RN BSN  Structural Heart Coordinator   [facility]  [phone]    Patient Active Problem List   Diagnosis     Benign essential hypertension      Hypothyroidism     Dyslipidemia     Persistent atrial fibrillation (H)     Cognitive impairment     Malignant neoplasm of upper-outer quadrant of left breast in female, estrogen receptor positive (H)     Restless legs syndrome (RLS)     History of cerebral hemorrhage     Current Outpatient Medications   Medication Sig     acetaminophen (TYLENOL) 500 MG tablet Take 500-1,000 mg by mouth every 6 hours as needed for pain     apixaban ANTICOAGULANT (ELIQUIS) 5 MG tablet Take 1 tablet (5 mg) by mouth 2 times daily     ARIPiprazole (ABILIFY) 5 MG tablet Take 5 mg by mouth every morning     aspirin-acetaminophen-caffeine (EXCEDRIN MIGRAINE) 250-250-65 MG tablet Take 1 tablet by mouth every 4 hours as needed for headaches     cholecalciferol, vitamin D3, 50 mcg (2,000 unit) capsule [CHOLECALCIFEROL, VITAMIN D3, 50 MCG (2,000 UNIT) CAPSULE] Take 1 capsule by mouth daily.     cloNIDine HCL (CATAPRES) 0.1 MG tablet Take 1 tablet by mouth 2 times daily Hold if BP <100/60     cyanocobalamin 1000 MCG tablet [CYANOCOBALAMIN 1000 MCG TABLET] Take 1,000 mcg by mouth daily.     divalproex sodium delayed-release (DEPAKOTE) 250 MG DR tablet Take 250 mg by mouth 2 times daily     DULoxetine HCl 40 MG CPEP Take 40 mg by mouth daily     estradiol (ESTRACE) 0.1 MG/GM vaginal cream Place vaginally three times a week     exemestane (AROMASIN) 25 MG tablet Take 25 mg by mouth daily     levothyroxine (SYNTHROID/LEVOTHROID) 137 MCG tablet Take 68.5 mcg by mouth daily     losartan (COZAAR) 25 MG tablet Take 1 tablet by mouth daily at 2 pm     Melatonin 10 MG CAPS Take 10 mg by mouth nightly as needed for sleep     memantine (NAMENDA) 5 MG tablet Take 2.5 mg by mouth 2 times daily     potassium chloride ER (KLOR-CON M) 20 MEQ CR tablet Take 20 mEq by mouth daily     rosuvastatin (CRESTOR) 5 MG tablet Take 5 mg by mouth At Bedtime      traZODone (DESYREL) 50 MG tablet Take 50 mg by mouth At Bedtime     No current facility-administered  medications for this visit.      Allergies   Allergen Reactions     Atenolol Unknown     Other reaction(s): Bradycardia, Other reaction(s): Bradycardia     Hydrochlorothiazide Unknown     Other reaction(s): Hypercalcemia, Other reaction(s): Hypercalcemia     Gabapentin Other (See Comments)     Other reaction(s): Confusion, Other reaction(s): Insomnia, Unknown, insomnia, insomnia     Lisinopril Nausea     Other reaction(s): GI Upset, Other reaction(s): Abdominal Pain     Nortriptyline Unknown     Oxycodone Itching     Paroxetine Unknown     Other reaction(s): Drowsiness, Sedation, Other reaction(s): Sedation     Tetracycline Photosensitivity and Rash     Amlodipine Unknown     Other reaction(s): GI Upset, Vomiting, Other reaction(s): Abdominal Pain     Codeine Itching     Tolerates tylenol 3 at home per pt     Oxycodone-Acetaminophen Itching     Sertraline Unknown     Other reaction(s): Sedation, Other reaction(s): Sedation     Diphenhydramine Anxiety     Other reaction(s): Agitation, Other reaction(s): Unknown

## 2023-04-10 NOTE — PROGRESS NOTES
[facility] [address], [place] [address]   [email or web address]   OFFICE: [phone]        Primary Care: Claudia Mandujano MD    This visit will serve as history and physical for left atrial appendage occlusion via Watchman device with Dr. Davies on 4/13/2023.  See assessment and plan for further details.      Assessment/Recommendations       Persistent atrial fibrillation: Diagnosed 2021 after incidental detection with controlled ventricular rates.  Asymptomatic. Ventricular rate controlled/borderline slow off AV chrissy blocking agents.    She has a [id number] score of 6 for age >75, female gender, history of hemorrhagic stroke, hypertension; HAS-BLED score of 3 for age >75, stroke, bleeding disposition. She is not a good candidate for long-term anticoagulation due to history of hemorrhagic stroke and unsteady gait increasing fall risk. She received CT imaging 3/18/2023 which has indicated suitable anatomy for LAAC procedure and negative for thrombus.    She and her spouse understand that the risks of the procedure are <2% and include but are not limited to device embolization, air embolism, myocardial perforation, device thrombosis, ASD, stroke, or death. We reviewed the medication changes that will place subsequent to left atrial appendage occlusion, including stopping oral anticoagulation and initiation of aspirin 81 mg daily and Plavix 75 mg daily. She will continue DAPT for 6 months, then stop Plavix and remain on aspirin 81 mg daily indefinitely. She will have a GLENN approximately 3 months post-implant. We discussed expected recovery and follow-up. All questions were answered to her satisfaction and she is ready to proceed.     Medical, past medical, surgical and social history reviewed and updated. Current medications and allergies reviewed. No personal or family history of adverse reactions to anesthesia or abnormal bleeding with surgery. Labs today  including CBC BMP and T&S pending.      Consents were signed and witnessed by me.      Jodee Johnson will report the morning of 4/13/2023 for the procedure. All instructions regarding times and medications were given by NOE coordinator RN.     Sinus node dysfunction: Numerous pauses >2 seconds, the longest being 4.1 seconds, noted on 2-week MCOT in January. No dizziness/lightheadedness, presyncope or syncope, or falls. Continue closely monitoring for progression and development of symptomatic bradycardia.     Hypertension: Hypotensive in clinic today, prior readings at goal and history of refractory hypertension. Advised adequate hydration and monitor closely for lightheadness/dizziness or orthopnea. Continue losartan 25 mg daily and clonidine 0.1 mg twice daily    Dyslipidemia: continue rosuvastatin 5mg daily    History of hemorrhagic stroke 2015    Follow up: with myself 5/26/2023, 6 week post implant; GLENN 7/19/2023     History of Present Illness/Subjective    Jodee Johnson is a 82 year old female who is seen today for history and physical prior to left atrial appendage closure via Watchman device with Dr. Davies. She has a past medical history significant for persistent atrial fibrillation, hypertension, dyslipidemia, hypothyroidism, left breast cancer (status post lumpectomy 2022 and XRT, now on exemestane), depression and cognitive impairment.  Hemorrhagic stroke 2015.    Her history of atrial fibrillation dates back to 2021 at which time it was detected incidentally.  She has remained largely asymptomatic though she did have occasional lightheadedness with position changes. MCOT in January was significant for pauses greater than 2 seconds during the day and overnight, the longest being 4.1 seconds, recommendations to continue monitoring for progression given that she also remains asymptomatic in this regard. She remains off AV chrissy blocking agents.    She was initiated on Eliquis 5 mg twice daily for stroke  prophylaxis after her lumpectomy was completed. She has suffered a hemorrhagic stroke in 2015, to be secondary to uncontrolled hypertension.  She has an unsteady gait as well.  She has no history of GI bleeding. She was a nurse at [employer]. She denies chest discomfort, palpitations, abdominal fullness/bloating or peripheral edema, shortness of breath, paroxysmal nocturnal dyspnea, orthopnea, lightheadedness, dizziness, pre-syncope, or syncope.     Data Review     EK/10/2023: Atrial fibrillation at 88 bpm, nonspecific ST-T abnormalities to inferior lateral leads, QRS 74 ms, QT/QTc 350/423 ms  2022: Atrial fibrillation with modestly elevated ventricular response at 91 bpm, nonspecific ST-T abnormalities to inferior lateral leads,  QRS 76 ms, QT/QTc 258/317 ms  2021: Atrial fibrillation at 70 bpm  Personally reviewed.     ECHOCARDIOGRAM: 2022  The left atrium is mildly dilated.  Biplane LVEF is 60%.  Diastolic Doppler findings (E/E' ratio and/or other parameters) suggest left  ventricular filling pressures are indeterminate.  There is mild (1+) mitral regurgitation.  There is mild (1+) aortic regurgitation.  There is trace to mild tricuspid regurgitation.  Doppler findings do not suggest pulmonary hypertension.  The rhythm was atrial fibrillation.    MCOT AND/OR EVENT MONITOR:   Mobile cardiac telemetry monitoring from 2023 to 2023 (monitored duration 10d 11h 3m).  Continuous atrial fibrillation, ventricular rates 21 (2023 04:54am) to 132bpm, average 59bpm.  60 pauses of over 2.0s, longest 4.1s (2023 21:46) - these were noted during daytime and nighttime hours.  Narrow QRS.    Rare premature ventricular contractions (<1%).  Symptom triggers (1, other) correlated to atrial fibrillation with ventricular rate 70bpm.    ADDITIONAL STUDIES:    CT angio pulmonary veins: 3/18/2023  The measurement of left atrial appendage at 35% cardiac offices and LCx bifurcation:  Circumferential  area: 727 mm   Perimeter: 67 mm  Measured diameters: 18 x 25 mm  Average diameter: 20 mm  Useful depth: 25 mm  Maximal depth: 37 mm     2.  No thrombus in left atrial appendage.  Left atrium is moderately enlarged.     3.  Normal coronary artery, no coronary artery disease.     4.  No pericardial effusion.  No calcified pericardium.      NMST: 1/5/2023     The nuclear stress test is negative for inducible myocardial ischemia or infarction.     Left ventricular function is normal. The left ventricular ejection fraction at rest is 65%.     A prior study was conducted on 5/9/2014.  Prior images were unavailable for comparison review.    50 minutes spent reviewing prior records (including documentation, laboratory studies, cardiac testing/imaging), history and physical exam, planning, and subsequent documentation.     I have reviewed and updated the patient's past medical history, social history, family history, and medication list.                Physical Examination Review of Systems   BP (!) 84/57 (BP Location: Left arm, Patient Position: Sitting, Cuff Size: Adult Regular)   Pulse 88   Resp 14   Wt 81.2 kg (179 lb)   BMI 32.22 kg/m      Body mass index is 32.22 kg/m .    Wt Readings from Last 3 Encounters:   04/10/23 81.2 kg (179 lb)   03/01/23 81.6 kg (180 lb)   02/13/23 82.1 kg (181 lb)       General   Appearance:   Alert and oriented, in no acute distress.    HEENT:  Normocephalic and atraumatic. Wearing a mask. Conjunctiva and sclera are clear. Moist oral mucosa.    Neck: No JVP, carotid bruit or obvious thyromegaly.   Lungs:   Respirations unlabored. Clear bilaterally with no rales, rhonchi, or wheezes.     Cardiovascular:   Rhythm is irregular. S1 and S2 are normal. No significant murmur is present. Lower extremities demonstrate no significant edema. Posterior tibial pulses are intact bilaterally.   Extremities: No cyanosis or clubbing   Skin: Skin is warm, dry, and otherwise intact.   Neurologic: Patient  seated for duration of exam. Mood and affect appropriate.                                                Medical History  Surgical History Family History Social History   Past Medical History:   Diagnosis Date     Atrial fibrillation (H)      Cancer (H)     breast     Depression      Hemorrhagic cerebrovascular accident (CVA) (H)     due to HTN?     Hyperlipidemia      Hypertension      Hypotension, unspecified hypotension type 8/17/2021     Hypothyroidism      Restless leg syndrome      Weight loss 8/17/2021    Past Surgical History:   Procedure Laterality Date     LUMPECTOMY BREAST  01/20/2022    No family history on file. Social History     Tobacco Use     Smoking status: Former     Packs/day: 0.00     Types: Cigarettes     Smokeless tobacco: Never          Medications  Allergies   Current Outpatient Medications   Medication Sig Dispense Refill     acetaminophen (TYLENOL) 500 MG tablet Take 500-1,000 mg by mouth every 6 hours as needed for pain       apixaban ANTICOAGULANT (ELIQUIS) 5 MG tablet Take 1 tablet (5 mg) by mouth 2 times daily 180 tablet 4     ARIPiprazole (ABILIFY) 5 MG tablet Take 5 mg by mouth every morning       aspirin-acetaminophen-caffeine (EXCEDRIN MIGRAINE) 250-250-65 MG tablet Take 1 tablet by mouth every 4 hours as needed for headaches       cephALEXin (KEFLEX) 250 MG capsule Take 250 mg by mouth daily       cholecalciferol, vitamin D3, 50 mcg (2,000 unit) capsule [CHOLECALCIFEROL, VITAMIN D3, 50 MCG (2,000 UNIT) CAPSULE] Take 1 capsule by mouth daily.       cloNIDine HCL (CATAPRES) 0.1 MG tablet Take 1 tablet by mouth 2 times daily Hold if BP <100/60       cyanocobalamin 1000 MCG tablet [CYANOCOBALAMIN 1000 MCG TABLET] Take 1,000 mcg by mouth daily.       divalproex sodium delayed-release (DEPAKOTE) 250 MG DR tablet Take 250 mg by mouth 2 times daily       DULoxetine HCl 40 MG CPEP Take 40 mg by mouth daily       estradiol (ESTRACE) 0.1 MG/GM vaginal cream Place vaginally three times a week        exemestane (AROMASIN) 25 MG tablet Take 25 mg by mouth daily       levothyroxine (SYNTHROID/LEVOTHROID) 137 MCG tablet Take 68.5 mcg by mouth daily       losartan (COZAAR) 25 MG tablet Take 1 tablet by mouth daily at 2 pm       Melatonin 10 MG CAPS Take 10 mg by mouth nightly as needed for sleep       memantine (NAMENDA) 5 MG tablet Take 2.5 mg by mouth 2 times daily       potassium chloride ER (KLOR-CON M) 20 MEQ CR tablet Take 20 mEq by mouth daily       rosuvastatin (CRESTOR) 5 MG tablet Take 5 mg by mouth At Bedtime        traZODone (DESYREL) 50 MG tablet Take 50 mg by mouth At Bedtime      Allergies   Allergen Reactions     Atenolol Unknown     Other reaction(s): Bradycardia, Other reaction(s): Bradycardia     Hydrochlorothiazide Unknown     Other reaction(s): Hypercalcemia, Other reaction(s): Hypercalcemia     Gabapentin Other (See Comments)     Other reaction(s): Confusion, Other reaction(s): Insomnia, Unknown, insomnia, insomnia     Lisinopril Nausea     Other reaction(s): GI Upset, Other reaction(s): Abdominal Pain     Nortriptyline Unknown     Oxycodone Itching     Paroxetine Unknown     Other reaction(s): Drowsiness, Sedation, Other reaction(s): Sedation     Tetracycline Photosensitivity and Rash     Amlodipine Unknown     Other reaction(s): GI Upset, Vomiting, Other reaction(s): Abdominal Pain     Codeine Itching     Tolerates tylenol 3 at home per pt     Oxycodone-Acetaminophen Itching     Sertraline Unknown     Other reaction(s): Sedation, Other reaction(s): Sedation     Diphenhydramine Anxiety     Other reaction(s): Agitation, Other reaction(s): Unknown     No personal or family history of prior complications of general anesthesia.         Lab Results    Chemistry/lipid CBC Cardiac Enzymes/BNP/TSH/INR   Lab Results   Component Value Date    BUN 17 02/17/2022     02/17/2022    CO2 27 02/17/2022     No results found for: CREATININE    No results found for: CHOL, HDL, LDL, CHOLHDL   No  results found for: WBC, HGB, HCT, MCV, PLT No results found for: CKTOTAL, CKMB, TROPONINI, BNP, TSH, INR     This note has been dictated using voice recognition software. Any grammatical, typographical, or context distortions are unintentional and inherent to the software.    Kristina Black CNP  Clinical Cardiac Electrophysiology  [address]  [place] [address]   Office: [phone]  Fax: [phone]

## 2023-04-10 NOTE — LETTER
4/10/2023    CLAUDIA PALUMBO  [facility] [address] [address] [facility] [address]    RE: Jodee Johnson       Dear Colleague,     I had the pleasure of seeing Jodee Johnson in the [facility].      [facility]   [address], [place] [address]   [email or web address]   OFFICE: [phone]        Primary Care: Claudia Palumbo MD    This visit will serve as history and physical for left atrial appendage occlusion via Watchman device with Dr. Davies on 4/13/2023.  See assessment and plan for further details.      Assessment/Recommendations       Persistent atrial fibrillation: Diagnosed 2021 after incidental detection with controlled ventricular rates.  Asymptomatic. Ventricular rate controlled/borderline slow off AV chrissy blocking agents.    She has a [id number] score of 6 for age >75, female gender, history of hemorrhagic stroke, hypertension; HAS-BLED score of 3 for age >75, stroke, bleeding disposition. She is not a good candidate for long-term anticoagulation due to history of hemorrhagic stroke and unsteady gait increasing fall risk. She received CT imaging 3/18/2023 which has indicated suitable anatomy for LAAC procedure and negative for thrombus.    She and her spouse understand that the risks of the procedure are <2% and include but are not limited to device embolization, air embolism, myocardial perforation, device thrombosis, ASD, stroke, or death. We reviewed the medication changes that will place subsequent to left atrial appendage occlusion, including stopping oral anticoagulation and initiation of aspirin 81 mg daily and Plavix 75 mg daily. She will continue DAPT for 6 months, then stop Plavix and remain on aspirin 81 mg daily indefinitely. She will have a GLENN approximately 3 months post-implant. We discussed expected recovery and follow-up. All questions were answered to her satisfaction and she is ready to proceed.      Medical, past medical, surgical and social history reviewed and updated. Current medications and allergies reviewed. No personal or family history of adverse reactions to anesthesia or abnormal bleeding with surgery. Labs today including CBC BMP and T&S pending.      Consents were signed and witnessed by me.      Jodee Johnson will report the morning of 4/13/2023 for the procedure. All instructions regarding times and medications were given by LAAO coordinator RN.     Sinus node dysfunction: Numerous pauses >2 seconds, the longest being 4.1 seconds, noted on 2-week MCOT in January. No dizziness/lightheadedness, presyncope or syncope, or falls. Continue closely monitoring for progression and development of symptomatic bradycardia.     Hypertension: Hypotensive in clinic today, prior readings at goal and history of refractory hypertension. Advised adequate hydration and monitor closely for lightheadness/dizziness or orthopnea. Continue losartan 25 mg daily and clonidine 0.1 mg twice daily    Dyslipidemia: continue rosuvastatin 5mg daily    History of hemorrhagic stroke 2015    Follow up: with myself 5/26/2023, 6 week post implant; GLENN 7/19/2023     History of Present Illness/Subjective    Jodee Johnson is a 82 year old female who is seen today for history and physical prior to left atrial appendage closure via Watchman device with Dr. Davies. She has a past medical history significant for persistent atrial fibrillation, hypertension, dyslipidemia, hypothyroidism, left breast cancer (status post lumpectomy 2022 and XRT, now on exemestane), depression and cognitive impairment.  Hemorrhagic stroke 2015.    Her history of atrial fibrillation dates back to 2021 at which time it was detected incidentally.  She has remained largely asymptomatic though she did have occasional lightheadedness with position changes. MCOT in January was significant for pauses greater than 2 seconds during the day and overnight, the longest  being 4.1 seconds, recommendations to continue monitoring for progression given that she also remains asymptomatic in this regard. She remains off AV chrissy blocking agents.    She was initiated on Eliquis 5 mg twice daily for stroke prophylaxis after her lumpectomy was completed. She has suffered a hemorrhagic stroke in 2015, to be secondary to uncontrolled hypertension.  She has an unsteady gait as well.  She has no history of GI bleeding. She was a nurse at [employer]. She denies chest discomfort, palpitations, abdominal fullness/bloating or peripheral edema, shortness of breath, paroxysmal nocturnal dyspnea, orthopnea, lightheadedness, dizziness, pre-syncope, or syncope.     Data Review     EK/10/2023: Atrial fibrillation at 88 bpm, nonspecific ST-T abnormalities to inferior lateral leads, QRS 74 ms, QT/QTc 350/423 ms  2022: Atrial fibrillation with modestly elevated ventricular response at 91 bpm, nonspecific ST-T abnormalities to inferior lateral leads,  QRS 76 ms, QT/QTc 258/317 ms  2021: Atrial fibrillation at 70 bpm  Personally reviewed.     ECHOCARDIOGRAM: 2022  The left atrium is mildly dilated.  Biplane LVEF is 60%.  Diastolic Doppler findings (E/E' ratio and/or other parameters) suggest left  ventricular filling pressures are indeterminate.  There is mild (1+) mitral regurgitation.  There is mild (1+) aortic regurgitation.  There is trace to mild tricuspid regurgitation.  Doppler findings do not suggest pulmonary hypertension.  The rhythm was atrial fibrillation.    MCOT AND/OR EVENT MONITOR:   Mobile cardiac telemetry monitoring from 2023 to 2023 (monitored duration 10d 11h 3m).  Continuous atrial fibrillation, ventricular rates 21 (2023 04:54am) to 132bpm, average 59bpm.  60 pauses of over 2.0s, longest 4.1s (2023 21:46) - these were noted during daytime and nighttime hours.  Narrow QRS.    Rare premature ventricular contractions (<1%).  Symptom triggers (1,  other) correlated to atrial fibrillation with ventricular rate 70bpm.    ADDITIONAL STUDIES:    CT angio pulmonary veins: 3/18/2023  The measurement of left atrial appendage at 35% cardiac offices and LCx bifurcation:  Circumferential area: 727 mm   Perimeter: 67 mm  Measured diameters: 18 x 25 mm  Average diameter: 20 mm  Useful depth: 25 mm  Maximal depth: 37 mm     2.  No thrombus in left atrial appendage.  Left atrium is moderately enlarged.     3.  Normal coronary artery, no coronary artery disease.     4.  No pericardial effusion.  No calcified pericardium.      NMST: 1/5/2023    The nuclear stress test is negative for inducible myocardial ischemia or infarction.    Left ventricular function is normal. The left ventricular ejection fraction at rest is 65%.    A prior study was conducted on 5/9/2014.  Prior images were unavailable for comparison review.    50 minutes spent reviewing prior records (including documentation, laboratory studies, cardiac testing/imaging), history and physical exam, planning, and subsequent documentation.     I have reviewed and updated the patient's past medical history, social history, family history, and medication list.                Physical Examination Review of Systems   BP (!) 84/57 (BP Location: Left arm, Patient Position: Sitting, Cuff Size: Adult Regular)   Pulse 88   Resp 14   Wt 81.2 kg (179 lb)   BMI 32.22 kg/m      Body mass index is 32.22 kg/m .    Wt Readings from Last 3 Encounters:   04/10/23 81.2 kg (179 lb)   03/01/23 81.6 kg (180 lb)   02/13/23 82.1 kg (181 lb)       General   Appearance:   Alert and oriented, in no acute distress.    HEENT:  Normocephalic and atraumatic. Wearing a mask. Conjunctiva and sclera are clear. Moist oral mucosa.    Neck: No JVP, carotid bruit or obvious thyromegaly.   Lungs:   Respirations unlabored. Clear bilaterally with no rales, rhonchi, or wheezes.     Cardiovascular:   Rhythm is irregular. S1 and S2 are normal. No  significant murmur is present. Lower extremities demonstrate no significant edema. Posterior tibial pulses are intact bilaterally.   Extremities: No cyanosis or clubbing   Skin: Skin is warm, dry, and otherwise intact.   Neurologic: Patient seated for duration of exam. Mood and affect appropriate.                                                Medical History  Surgical History Family History Social History   Past Medical History:   Diagnosis Date    Atrial fibrillation (H)     Cancer (H)     breast    Depression     Hemorrhagic cerebrovascular accident (CVA) (H)     due to HTN?    Hyperlipidemia     Hypertension     Hypotension, unspecified hypotension type 8/17/2021    Hypothyroidism     Restless leg syndrome     Weight loss 8/17/2021    Past Surgical History:   Procedure Laterality Date    LUMPECTOMY BREAST  01/20/2022    No family history on file. Social History     Tobacco Use    Smoking status: Former     Packs/day: 0.00     Types: Cigarettes    Smokeless tobacco: Never          Medications  Allergies   Current Outpatient Medications   Medication Sig Dispense Refill    acetaminophen (TYLENOL) 500 MG tablet Take 500-1,000 mg by mouth every 6 hours as needed for pain      apixaban ANTICOAGULANT (ELIQUIS) 5 MG tablet Take 1 tablet (5 mg) by mouth 2 times daily 180 tablet 4    ARIPiprazole (ABILIFY) 5 MG tablet Take 5 mg by mouth every morning      aspirin-acetaminophen-caffeine (EXCEDRIN MIGRAINE) 250-250-65 MG tablet Take 1 tablet by mouth every 4 hours as needed for headaches      cephALEXin (KEFLEX) 250 MG capsule Take 250 mg by mouth daily      cholecalciferol, vitamin D3, 50 mcg (2,000 unit) capsule [CHOLECALCIFEROL, VITAMIN D3, 50 MCG (2,000 UNIT) CAPSULE] Take 1 capsule by mouth daily.      cloNIDine HCL (CATAPRES) 0.1 MG tablet Take 1 tablet by mouth 2 times daily Hold if BP <100/60      cyanocobalamin 1000 MCG tablet [CYANOCOBALAMIN 1000 MCG TABLET] Take 1,000 mcg by mouth daily.      divalproex sodium  delayed-release (DEPAKOTE) 250 MG DR tablet Take 250 mg by mouth 2 times daily      DULoxetine HCl 40 MG CPEP Take 40 mg by mouth daily      estradiol (ESTRACE) 0.1 MG/GM vaginal cream Place vaginally three times a week      exemestane (AROMASIN) 25 MG tablet Take 25 mg by mouth daily      levothyroxine (SYNTHROID/LEVOTHROID) 137 MCG tablet Take 68.5 mcg by mouth daily      losartan (COZAAR) 25 MG tablet Take 1 tablet by mouth daily at 2 pm      Melatonin 10 MG CAPS Take 10 mg by mouth nightly as needed for sleep      memantine (NAMENDA) 5 MG tablet Take 2.5 mg by mouth 2 times daily      potassium chloride ER (KLOR-CON M) 20 MEQ CR tablet Take 20 mEq by mouth daily      rosuvastatin (CRESTOR) 5 MG tablet Take 5 mg by mouth At Bedtime       traZODone (DESYREL) 50 MG tablet Take 50 mg by mouth At Bedtime      Allergies   Allergen Reactions    Atenolol Unknown     Other reaction(s): Bradycardia, Other reaction(s): Bradycardia    Hydrochlorothiazide Unknown     Other reaction(s): Hypercalcemia, Other reaction(s): Hypercalcemia    Gabapentin Other (See Comments)     Other reaction(s): Confusion, Other reaction(s): Insomnia, Unknown, insomnia, insomnia    Lisinopril Nausea     Other reaction(s): GI Upset, Other reaction(s): Abdominal Pain    Nortriptyline Unknown    Oxycodone Itching    Paroxetine Unknown     Other reaction(s): Drowsiness, Sedation, Other reaction(s): Sedation    Tetracycline Photosensitivity and Rash    Amlodipine Unknown     Other reaction(s): GI Upset, Vomiting, Other reaction(s): Abdominal Pain    Codeine Itching     Tolerates tylenol 3 at home per pt    Oxycodone-Acetaminophen Itching    Sertraline Unknown     Other reaction(s): Sedation, Other reaction(s): Sedation    Diphenhydramine Anxiety     Other reaction(s): Agitation, Other reaction(s): Unknown     No personal or family history of prior complications of general anesthesia.         Lab Results    Chemistry/lipid CBC Cardiac  Enzymes/BNP/TSH/INR   Lab Results   Component Value Date    BUN 17 02/17/2022     02/17/2022    CO2 27 02/17/2022     No results found for: CREATININE    No results found for: CHOL, HDL, LDL, CHOLHDL   No results found for: WBC, HGB, HCT, MCV, PLT No results found for: CKTOTAL, CKMB, TROPONINI, BNP, TSH, INR     This note has been dictated using voice recognition software. Any grammatical, typographical, or context distortions are unintentional and inherent to the software.    Kristina Black CNP  Clinical Cardiac Electrophysiology  [facility]  [address]  [place] [address]   Office: [phone]  Fax: [phone]     Thank you for allowing me to participate in the care of your patient.      Sincerely,     BETO GRISSOM CNP     [facility]  cc:   No referring provider defined for this encounter.

## 2023-04-10 NOTE — H&P (VIEW-ONLY)
[facility] [address], [place] [address]   [email or web address]   OFFICE: [phone]        Primary Care: Claudia Mandujano MD    This visit will serve as history and physical for left atrial appendage occlusion via Watchman device with Dr. Davies on 4/13/2023.  See assessment and plan for further details.      Assessment/Recommendations       Persistent atrial fibrillation: Diagnosed 2021 after incidental detection with controlled ventricular rates.  Asymptomatic. Ventricular rate controlled/borderline slow off AV chrissy blocking agents.    She has a [id number] score of 6 for age >75, female gender, history of hemorrhagic stroke, hypertension; HAS-BLED score of 3 for age >75, stroke, bleeding disposition. She is not a good candidate for long-term anticoagulation due to history of hemorrhagic stroke and unsteady gait increasing fall risk. She received CT imaging 3/18/2023 which has indicated suitable anatomy for LAAC procedure and negative for thrombus.    She and her spouse understand that the risks of the procedure are <2% and include but are not limited to device embolization, air embolism, myocardial perforation, device thrombosis, ASD, stroke, or death. We reviewed the medication changes that will place subsequent to left atrial appendage occlusion, including stopping oral anticoagulation and initiation of aspirin 81 mg daily and Plavix 75 mg daily. She will continue DAPT for 6 months, then stop Plavix and remain on aspirin 81 mg daily indefinitely. She will have a GLENN approximately 3 months post-implant. We discussed expected recovery and follow-up. All questions were answered to her satisfaction and she is ready to proceed.     Medical, past medical, surgical and social history reviewed and updated. Current medications and allergies reviewed. No personal or family history of adverse reactions to anesthesia or abnormal bleeding with surgery. Labs today  including CBC BMP and T&S pending.      Consents were signed and witnessed by me.      Jodee Johnson will report the morning of 4/13/2023 for the procedure. All instructions regarding times and medications were given by NOE coordinator RN.     Sinus node dysfunction: Numerous pauses >2 seconds, the longest being 4.1 seconds, noted on 2-week MCOT in January. No dizziness/lightheadedness, presyncope or syncope, or falls. Continue closely monitoring for progression and development of symptomatic bradycardia.     Hypertension: Hypotensive in clinic today, prior readings at goal and history of refractory hypertension. Advised adequate hydration and monitor closely for lightheadness/dizziness or orthopnea. Continue losartan 25 mg daily and clonidine 0.1 mg twice daily    Dyslipidemia: continue rosuvastatin 5mg daily    History of hemorrhagic stroke 2015    Follow up: with myself 5/26/2023, 6 week post implant; GLENN 7/19/2023     History of Present Illness/Subjective    Jodee Johnson is a 82 year old female who is seen today for history and physical prior to left atrial appendage closure via Watchman device with Dr. Davies. She has a past medical history significant for persistent atrial fibrillation, hypertension, dyslipidemia, hypothyroidism, left breast cancer (status post lumpectomy 2022 and XRT, now on exemestane), depression and cognitive impairment.  Hemorrhagic stroke 2015.    Her history of atrial fibrillation dates back to 2021 at which time it was detected incidentally.  She has remained largely asymptomatic though she did have occasional lightheadedness with position changes. MCOT in January was significant for pauses greater than 2 seconds during the day and overnight, the longest being 4.1 seconds, recommendations to continue monitoring for progression given that she also remains asymptomatic in this regard. She remains off AV chrissy blocking agents.    She was initiated on Eliquis 5 mg twice daily for stroke  prophylaxis after her lumpectomy was completed. She has suffered a hemorrhagic stroke in 2015, to be secondary to uncontrolled hypertension.  She has an unsteady gait as well.  She has no history of GI bleeding. She was a nurse at [employer]. She denies chest discomfort, palpitations, abdominal fullness/bloating or peripheral edema, shortness of breath, paroxysmal nocturnal dyspnea, orthopnea, lightheadedness, dizziness, pre-syncope, or syncope.     Data Review     EK/10/2023: Atrial fibrillation at 88 bpm, nonspecific ST-T abnormalities to inferior lateral leads, QRS 74 ms, QT/QTc 350/423 ms  2022: Atrial fibrillation with modestly elevated ventricular response at 91 bpm, nonspecific ST-T abnormalities to inferior lateral leads,  QRS 76 ms, QT/QTc 258/317 ms  2021: Atrial fibrillation at 70 bpm  Personally reviewed.     ECHOCARDIOGRAM: 2022  The left atrium is mildly dilated.  Biplane LVEF is 60%.  Diastolic Doppler findings (E/E' ratio and/or other parameters) suggest left  ventricular filling pressures are indeterminate.  There is mild (1+) mitral regurgitation.  There is mild (1+) aortic regurgitation.  There is trace to mild tricuspid regurgitation.  Doppler findings do not suggest pulmonary hypertension.  The rhythm was atrial fibrillation.    MCOT AND/OR EVENT MONITOR:   Mobile cardiac telemetry monitoring from 2023 to 2023 (monitored duration 10d 11h 3m).  Continuous atrial fibrillation, ventricular rates 21 (2023 04:54am) to 132bpm, average 59bpm.  60 pauses of over 2.0s, longest 4.1s (2023 21:46) - these were noted during daytime and nighttime hours.  Narrow QRS.    Rare premature ventricular contractions (<1%).  Symptom triggers (1, other) correlated to atrial fibrillation with ventricular rate 70bpm.    ADDITIONAL STUDIES:    CT angio pulmonary veins: 3/18/2023  The measurement of left atrial appendage at 35% cardiac offices and LCx bifurcation:  Circumferential  area: 727 mm   Perimeter: 67 mm  Measured diameters: 18 x 25 mm  Average diameter: 20 mm  Useful depth: 25 mm  Maximal depth: 37 mm     2.  No thrombus in left atrial appendage.  Left atrium is moderately enlarged.     3.  Normal coronary artery, no coronary artery disease.     4.  No pericardial effusion.  No calcified pericardium.      NMST: 1/5/2023     The nuclear stress test is negative for inducible myocardial ischemia or infarction.     Left ventricular function is normal. The left ventricular ejection fraction at rest is 65%.     A prior study was conducted on 5/9/2014.  Prior images were unavailable for comparison review.    50 minutes spent reviewing prior records (including documentation, laboratory studies, cardiac testing/imaging), history and physical exam, planning, and subsequent documentation.     I have reviewed and updated the patient's past medical history, social history, family history, and medication list.                Physical Examination Review of Systems   BP (!) 84/57 (BP Location: Left arm, Patient Position: Sitting, Cuff Size: Adult Regular)   Pulse 88   Resp 14   Wt 81.2 kg (179 lb)   BMI 32.22 kg/m      Body mass index is 32.22 kg/m .    Wt Readings from Last 3 Encounters:   04/10/23 81.2 kg (179 lb)   03/01/23 81.6 kg (180 lb)   02/13/23 82.1 kg (181 lb)       General   Appearance:   Alert and oriented, in no acute distress.    HEENT:  Normocephalic and atraumatic. Wearing a mask. Conjunctiva and sclera are clear. Moist oral mucosa.    Neck: No JVP, carotid bruit or obvious thyromegaly.   Lungs:   Respirations unlabored. Clear bilaterally with no rales, rhonchi, or wheezes.     Cardiovascular:   Rhythm is irregular. S1 and S2 are normal. No significant murmur is present. Lower extremities demonstrate no significant edema. Posterior tibial pulses are intact bilaterally.   Extremities: No cyanosis or clubbing   Skin: Skin is warm, dry, and otherwise intact.   Neurologic: Patient  seated for duration of exam. Mood and affect appropriate.                                                Medical History  Surgical History Family History Social History   Past Medical History:   Diagnosis Date     Atrial fibrillation (H)      Cancer (H)     breast     Depression      Hemorrhagic cerebrovascular accident (CVA) (H)     due to HTN?     Hyperlipidemia      Hypertension      Hypotension, unspecified hypotension type 8/17/2021     Hypothyroidism      Restless leg syndrome      Weight loss 8/17/2021    Past Surgical History:   Procedure Laterality Date     LUMPECTOMY BREAST  01/20/2022    No family history on file. Social History     Tobacco Use     Smoking status: Former     Packs/day: 0.00     Types: Cigarettes     Smokeless tobacco: Never          Medications  Allergies   Current Outpatient Medications   Medication Sig Dispense Refill     acetaminophen (TYLENOL) 500 MG tablet Take 500-1,000 mg by mouth every 6 hours as needed for pain       apixaban ANTICOAGULANT (ELIQUIS) 5 MG tablet Take 1 tablet (5 mg) by mouth 2 times daily 180 tablet 4     ARIPiprazole (ABILIFY) 5 MG tablet Take 5 mg by mouth every morning       aspirin-acetaminophen-caffeine (EXCEDRIN MIGRAINE) 250-250-65 MG tablet Take 1 tablet by mouth every 4 hours as needed for headaches       cephALEXin (KEFLEX) 250 MG capsule Take 250 mg by mouth daily       cholecalciferol, vitamin D3, 50 mcg (2,000 unit) capsule [CHOLECALCIFEROL, VITAMIN D3, 50 MCG (2,000 UNIT) CAPSULE] Take 1 capsule by mouth daily.       cloNIDine HCL (CATAPRES) 0.1 MG tablet Take 1 tablet by mouth 2 times daily Hold if BP <100/60       cyanocobalamin 1000 MCG tablet [CYANOCOBALAMIN 1000 MCG TABLET] Take 1,000 mcg by mouth daily.       divalproex sodium delayed-release (DEPAKOTE) 250 MG DR tablet Take 250 mg by mouth 2 times daily       DULoxetine HCl 40 MG CPEP Take 40 mg by mouth daily       estradiol (ESTRACE) 0.1 MG/GM vaginal cream Place vaginally three times a week        exemestane (AROMASIN) 25 MG tablet Take 25 mg by mouth daily       levothyroxine (SYNTHROID/LEVOTHROID) 137 MCG tablet Take 68.5 mcg by mouth daily       losartan (COZAAR) 25 MG tablet Take 1 tablet by mouth daily at 2 pm       Melatonin 10 MG CAPS Take 10 mg by mouth nightly as needed for sleep       memantine (NAMENDA) 5 MG tablet Take 2.5 mg by mouth 2 times daily       potassium chloride ER (KLOR-CON M) 20 MEQ CR tablet Take 20 mEq by mouth daily       rosuvastatin (CRESTOR) 5 MG tablet Take 5 mg by mouth At Bedtime        traZODone (DESYREL) 50 MG tablet Take 50 mg by mouth At Bedtime      Allergies   Allergen Reactions     Atenolol Unknown     Other reaction(s): Bradycardia, Other reaction(s): Bradycardia     Hydrochlorothiazide Unknown     Other reaction(s): Hypercalcemia, Other reaction(s): Hypercalcemia     Gabapentin Other (See Comments)     Other reaction(s): Confusion, Other reaction(s): Insomnia, Unknown, insomnia, insomnia     Lisinopril Nausea     Other reaction(s): GI Upset, Other reaction(s): Abdominal Pain     Nortriptyline Unknown     Oxycodone Itching     Paroxetine Unknown     Other reaction(s): Drowsiness, Sedation, Other reaction(s): Sedation     Tetracycline Photosensitivity and Rash     Amlodipine Unknown     Other reaction(s): GI Upset, Vomiting, Other reaction(s): Abdominal Pain     Codeine Itching     Tolerates tylenol 3 at home per pt     Oxycodone-Acetaminophen Itching     Sertraline Unknown     Other reaction(s): Sedation, Other reaction(s): Sedation     Diphenhydramine Anxiety     Other reaction(s): Agitation, Other reaction(s): Unknown     No personal or family history of prior complications of general anesthesia.         Lab Results    Chemistry/lipid CBC Cardiac Enzymes/BNP/TSH/INR   Lab Results   Component Value Date    BUN 17 02/17/2022     02/17/2022    CO2 27 02/17/2022     No results found for: CREATININE    No results found for: CHOL, HDL, LDL, CHOLHDL   No  results found for: WBC, HGB, HCT, MCV, PLT No results found for: CKTOTAL, CKMB, TROPONINI, BNP, TSH, INR     This note has been dictated using voice recognition software. Any grammatical, typographical, or context distortions are unintentional and inherent to the software.    Kristina Black CNP  Clinical Cardiac Electrophysiology  [address]  [place] [address]   Office: [phone]  Fax: [phone]

## 2023-04-11 NOTE — DISCHARGE INSTRUCTIONS
Going home after Left Atrial Appendage Occlusion Device Implant    Once Home:  You should arrange for someone to stay with you for the first 24 hours after discharge  Make sure you are taking all of your medications as directed in your discharge summary.  Do not stop taking these medications (especially your blood thinner and baby aspirin) without speaking to your provider.   Increase fluid intake for two days    No lifting more than 10 pounds for 5 days  No aggressive exercise for 5 days  No driving x 3 day  You may shower tomorrow; no bath x 5 days  Return to work in 3 days if you have a sedentary job or 5 days if you do manual labor      Care of groin site  It is normal to have a small bruise or lump at the site.  Do not scrub the site.  For the first 2 days: Do not stoop or squat. When you cough, sneeze or move your bowels, hold your hand over the puncture site and press gently.  Do not use lotion or powder near the puncture site for 3 days.  Ok to use ice packs at groin sites 20 minutes at a time for groin discomfort    If you start bleeding from the site in your groin, lie down flat and press firmly on the site. Call your doctor as soon as you can.    Call your doctor if:  You have a large or growing hard lump around the site.  The site is red, swollen, hot or tender.  Blood or fluid is draining from the site.  You have chills or a fever greater than 101 F (38 C).  Your leg or arm feels numb or cool.  You have hives, a rash or unusual itching.    To reduce the risk of infection, avoid dental procedures (including cleanings) for the first 6 weeks.  Contact your cardiology clinic for an antibiotic should you need to see the dentist in the first 6 weeks post left atrial appendage implant.    Dial 911 if you have bleeding that is heavy or does not stop OR for any NEW signs/symptoms of a stroke:  Visual disturbance  Problems with talking  Smile only occurs on half your face  Numbness on one side of your face or  body  Sudden headache  Confusion  Problems with walking or balance    Follow up appointments:   6 Week Post Implant Visit Date: May 25 at 9:10 am with Kristina Black NP  At [facility] [address], [address]  [facility] [address]    Post-procedure Transesophageal Echocardiogram: Please arrange for a responsible adult to drive you to and from this appointment. There will be nothing to eat or drink for at least 6 hours prior to your arrival time for the GLENN.  Date: July 19 at noon -  Location: [facility]      Your Procedural Physician was: Dr. Hobson     To reach the [facility] nurse coordinators please call:   [phone] for Mary Huizar    Or  [phone] for Rula Pimentel        If you have issues tonight when you get home:      Call [phone] and enter your phone number.  Marissa the PA will call you back.      If after 9 pm, call the [facility] at [phone] and you will be connected to the on call doctor                                                                    If you are calling after hours, please listen to the entire voicemail, a live  will answer at the end of the message

## 2023-04-13 ENCOUNTER — ANESTHESIA (OUTPATIENT)
Dept: CARDIOLOGY | Facility: HOSPITAL | Age: 83
DRG: 274 | End: 2023-04-13
Payer: COMMERCIAL

## 2023-04-13 ENCOUNTER — APPOINTMENT (OUTPATIENT)
Dept: RADIOLOGY | Facility: HOSPITAL | Age: 83
DRG: 274 | End: 2023-04-13
Attending: INTERNAL MEDICINE
Payer: COMMERCIAL

## 2023-04-13 ENCOUNTER — HOSPITAL ENCOUNTER (INPATIENT)
Facility: HOSPITAL | Age: 83
LOS: 1 days | Discharge: HOME | DRG: 274 | End: 2023-04-13
Attending: INTERNAL MEDICINE | Admitting: INTERNAL MEDICINE
Payer: COMMERCIAL

## 2023-04-13 ENCOUNTER — HOSPITAL ENCOUNTER (OUTPATIENT)
Dept: CARDIOLOGY | Facility: HOSPITAL | Age: 83
Discharge: HOME | DRG: 274 | End: 2023-04-13
Attending: INTERNAL MEDICINE | Admitting: INTERNAL MEDICINE
Payer: COMMERCIAL

## 2023-04-13 ENCOUNTER — ANESTHESIA EVENT (OUTPATIENT)
Dept: CARDIOLOGY | Facility: HOSPITAL | Age: 83
DRG: 274 | End: 2023-04-13
Payer: COMMERCIAL

## 2023-04-13 VITALS
HEIGHT: 62 IN | BODY MASS INDEX: 32.94 KG/M2 | WEIGHT: 179 LBS | HEART RATE: 89 BPM | OXYGEN SATURATION: 99 % | TEMPERATURE: 97 F | RESPIRATION RATE: 19 BRPM | DIASTOLIC BLOOD PRESSURE: 86 MMHG | SYSTOLIC BLOOD PRESSURE: 119 MMHG

## 2023-04-13 DIAGNOSIS — I48.19 PERSISTENT ATRIAL FIBRILLATION (H): ICD-10-CM

## 2023-04-13 DIAGNOSIS — Z95.818 PRESENCE OF WATCHMAN LEFT ATRIAL APPENDAGE CLOSURE DEVICE: ICD-10-CM

## 2023-04-13 DIAGNOSIS — Z95.818 PRESENCE OF WATCHMAN LEFT ATRIAL APPENDAGE CLOSURE DEVICE: Primary | ICD-10-CM

## 2023-04-13 PROBLEM — I10 BENIGN ESSENTIAL HYPERTENSION: Status: ACTIVE | Noted: 2021-03-26

## 2023-04-13 PROBLEM — I10 SEVERE UNCONTROLLED HYPERTENSION: Status: ACTIVE | Noted: 2019-08-23

## 2023-04-13 LAB
ACT BLD: 501 SECONDS (ref 74–150)
ACT BLD: 650 SECONDS (ref 74–150)
BLD PROD TYP BPU: NORMAL
BLD PROD TYP BPU: NORMAL
BLOOD COMPONENT TYPE: NORMAL
BLOOD COMPONENT TYPE: NORMAL
CODING SYSTEM: NORMAL
CODING SYSTEM: NORMAL
CREAT SERPL-MCNC: 0.95 MG/DL (ref 0.51–0.95)
CROSSMATCH: NORMAL
CROSSMATCH: NORMAL
GFR SERPL CREATININE-BSD FRML MDRD: 60 ML/MIN/1.73M2
HGB BLD-MCNC: 14.8 G/DL (ref 11.7–15.7)
LVEF ECHO: NORMAL
UNIT ABO/RH: NORMAL
UNIT ABO/RH: NORMAL
UNIT NUMBER: NORMAL
UNIT NUMBER: NORMAL
UNIT STATUS: NORMAL
UNIT STATUS: NORMAL
UNIT TYPE ISBT: 6200
UNIT TYPE ISBT: 6200

## 2023-04-13 PROCEDURE — 250N000009 HC RX 250: Performed by: STUDENT IN AN ORGANIZED HEALTH CARE EDUCATION/TRAINING PROGRAM

## 2023-04-13 PROCEDURE — C1894 INTRO/SHEATH, NON-LASER: HCPCS | Performed by: INTERNAL MEDICINE

## 2023-04-13 PROCEDURE — 99232 SBSQ HOSP IP/OBS MODERATE 35: CPT | Mod: 25 | Performed by: NURSE PRACTITIONER

## 2023-04-13 PROCEDURE — 36415 COLL VENOUS BLD VENIPUNCTURE: CPT | Performed by: INTERNAL MEDICINE

## 2023-04-13 PROCEDURE — 250N000011 HC RX IP 250 OP 636: Performed by: INTERNAL MEDICINE

## 2023-04-13 PROCEDURE — 02L73DK OCCLUSION OF LEFT ATRIAL APPENDAGE WITH INTRALUMINAL DEVICE, PERCUTANEOUS APPROACH: ICD-10-PCS | Performed by: INTERNAL MEDICINE

## 2023-04-13 PROCEDURE — 255N000002 HC RX 255 OP 636: Performed by: INTERNAL MEDICINE

## 2023-04-13 PROCEDURE — 258N000003 HC RX IP 258 OP 636: Performed by: INTERNAL MEDICINE

## 2023-04-13 PROCEDURE — 93355 ECHO TRANSESOPHAGEAL (TEE): CPT

## 2023-04-13 PROCEDURE — 272N000001 HC OR GENERAL SUPPLY STERILE: Performed by: INTERNAL MEDICINE

## 2023-04-13 PROCEDURE — 33340 PERQ CLSR TCAT L ATR APNDGE: CPT | Performed by: INTERNAL MEDICINE

## 2023-04-13 PROCEDURE — 86923 COMPATIBILITY TEST ELECTRIC: CPT | Performed by: INTERNAL MEDICINE

## 2023-04-13 PROCEDURE — 370N000017 HC ANESTHESIA TECHNICAL FEE, PER MIN: Performed by: INTERNAL MEDICINE

## 2023-04-13 PROCEDURE — 250N000011 HC RX IP 250 OP 636: Performed by: STUDENT IN AN ORGANIZED HEALTH CARE EDUCATION/TRAINING PROGRAM

## 2023-04-13 PROCEDURE — 258N000003 HC RX IP 258 OP 636: Performed by: STUDENT IN AN ORGANIZED HEALTH CARE EDUCATION/TRAINING PROGRAM

## 2023-04-13 PROCEDURE — 85347 COAGULATION TIME ACTIVATED: CPT

## 2023-04-13 PROCEDURE — 33340 PERQ CLSR TCAT L ATR APNDGE: CPT | Mod: Q0 | Performed by: INTERNAL MEDICINE

## 2023-04-13 PROCEDURE — 120N000001 HC R&B MED SURG/OB

## 2023-04-13 PROCEDURE — 250N000009 HC RX 250: Performed by: INTERNAL MEDICINE

## 2023-04-13 PROCEDURE — 82565 ASSAY OF CREATININE: CPT | Performed by: INTERNAL MEDICINE

## 2023-04-13 PROCEDURE — 710N000010 HC RECOVERY PHASE 1, LEVEL 2, PER MIN

## 2023-04-13 PROCEDURE — C1769 GUIDE WIRE: HCPCS | Performed by: INTERNAL MEDICINE

## 2023-04-13 PROCEDURE — 85018 HEMOGLOBIN: CPT | Performed by: INTERNAL MEDICINE

## 2023-04-13 PROCEDURE — 250N000013 HC RX MED GY IP 250 OP 250 PS 637: Performed by: INTERNAL MEDICINE

## 2023-04-13 PROCEDURE — 999N000065 XR CHEST PORT 1 VIEW

## 2023-04-13 PROCEDURE — 93355 ECHO TRANSESOPHAGEAL (TEE): CPT | Performed by: GENERAL ACUTE CARE HOSPITAL

## 2023-04-13 PROCEDURE — C1887 CATHETER, GUIDING: HCPCS | Performed by: INTERNAL MEDICINE

## 2023-04-13 DEVICE — OCCLUDER CV WATCHMAN FLX OD24 MM M635WU50240: Type: IMPLANTABLE DEVICE | Status: FUNCTIONAL

## 2023-04-13 RX ORDER — FENTANYL CITRATE 50 UG/ML
INJECTION, SOLUTION INTRAMUSCULAR; INTRAVENOUS PRN
Status: DISCONTINUED | OUTPATIENT
Start: 2023-04-13 | End: 2023-04-13

## 2023-04-13 RX ORDER — ONDANSETRON 4 MG/1
4 TABLET, ORALLY DISINTEGRATING ORAL EVERY 6 HOURS PRN
Status: DISCONTINUED | OUTPATIENT
Start: 2023-04-13 | End: 2023-04-13 | Stop reason: HOSPADM

## 2023-04-13 RX ORDER — ACETAMINOPHEN 325 MG/1
650 TABLET ORAL EVERY 4 HOURS PRN
Status: DISCONTINUED | OUTPATIENT
Start: 2023-04-13 | End: 2023-04-13 | Stop reason: HOSPADM

## 2023-04-13 RX ORDER — LIDOCAINE 40 MG/G
CREAM TOPICAL
Status: DISCONTINUED | OUTPATIENT
Start: 2023-04-13 | End: 2023-04-13 | Stop reason: HOSPADM

## 2023-04-13 RX ORDER — ONDANSETRON 2 MG/ML
INJECTION INTRAMUSCULAR; INTRAVENOUS PRN
Status: DISCONTINUED | OUTPATIENT
Start: 2023-04-13 | End: 2023-04-13

## 2023-04-13 RX ORDER — PROTAMINE SULFATE 10 MG/ML
INJECTION, SOLUTION INTRAVENOUS PRN
Status: DISCONTINUED | OUTPATIENT
Start: 2023-04-13 | End: 2023-04-13

## 2023-04-13 RX ORDER — LIDOCAINE HYDROCHLORIDE 10 MG/ML
INJECTION, SOLUTION INFILTRATION; PERINEURAL PRN
Status: DISCONTINUED | OUTPATIENT
Start: 2023-04-13 | End: 2023-04-13

## 2023-04-13 RX ORDER — DEXAMETHASONE SODIUM PHOSPHATE 10 MG/ML
INJECTION, SOLUTION INTRAMUSCULAR; INTRAVENOUS PRN
Status: DISCONTINUED | OUTPATIENT
Start: 2023-04-13 | End: 2023-04-13

## 2023-04-13 RX ORDER — PROPOFOL 10 MG/ML
INJECTION, EMULSION INTRAVENOUS PRN
Status: DISCONTINUED | OUTPATIENT
Start: 2023-04-13 | End: 2023-04-13

## 2023-04-13 RX ORDER — CLOPIDOGREL BISULFATE 75 MG/1
75 TABLET ORAL DAILY
Qty: 90 TABLET | Refills: 1 | Status: SHIPPED | OUTPATIENT
Start: 2023-04-13 | End: 2023-04-13

## 2023-04-13 RX ORDER — ASPIRIN 81 MG/1
81 TABLET ORAL ONCE
Status: COMPLETED | OUTPATIENT
Start: 2023-04-13 | End: 2023-04-13

## 2023-04-13 RX ORDER — ONDANSETRON 2 MG/ML
4 INJECTION INTRAMUSCULAR; INTRAVENOUS EVERY 6 HOURS PRN
Status: DISCONTINUED | OUTPATIENT
Start: 2023-04-13 | End: 2023-04-13 | Stop reason: HOSPADM

## 2023-04-13 RX ORDER — SODIUM CHLORIDE 9 MG/ML
INJECTION, SOLUTION INTRAVENOUS CONTINUOUS
Status: ACTIVE | OUTPATIENT
Start: 2023-04-13 | End: 2023-04-13

## 2023-04-13 RX ORDER — ACETAMINOPHEN 500 MG
500-1000 TABLET ORAL EVERY 6 HOURS PRN
Status: CANCELLED | OUTPATIENT
Start: 2023-04-13

## 2023-04-13 RX ORDER — CLOPIDOGREL BISULFATE 75 MG/1
75 TABLET ORAL DAILY
Qty: 90 TABLET | Refills: 3 | Status: SHIPPED | OUTPATIENT
Start: 2023-04-13 | End: 2023-10-11

## 2023-04-13 RX ORDER — SODIUM CHLORIDE, SODIUM LACTATE, POTASSIUM CHLORIDE, CALCIUM CHLORIDE 600; 310; 30; 20 MG/100ML; MG/100ML; MG/100ML; MG/100ML
INJECTION, SOLUTION INTRAVENOUS CONTINUOUS
Status: DISCONTINUED | OUTPATIENT
Start: 2023-04-13 | End: 2023-04-13 | Stop reason: HOSPADM

## 2023-04-13 RX ORDER — ASPIRIN 81 MG/1
81 TABLET ORAL DAILY
Status: DISCONTINUED | OUTPATIENT
Start: 2023-04-14 | End: 2023-04-13 | Stop reason: HOSPADM

## 2023-04-13 RX ORDER — CEFAZOLIN SODIUM/WATER 2 G/20 ML
2 SYRINGE (ML) INTRAVENOUS
Status: DISCONTINUED | OUTPATIENT
Start: 2023-04-13 | End: 2023-04-13 | Stop reason: HOSPADM

## 2023-04-13 RX ORDER — IODIXANOL 320 MG/ML
INJECTION, SOLUTION INTRAVASCULAR
Status: DISCONTINUED | OUTPATIENT
Start: 2023-04-13 | End: 2023-04-13 | Stop reason: HOSPADM

## 2023-04-13 RX ORDER — HEPARIN SODIUM 1000 [USP'U]/ML
INJECTION, SOLUTION INTRAVENOUS; SUBCUTANEOUS
Status: DISCONTINUED | OUTPATIENT
Start: 2023-04-13 | End: 2023-04-13 | Stop reason: HOSPADM

## 2023-04-13 RX ORDER — SODIUM CHLORIDE 9 MG/ML
1000 INJECTION, SOLUTION INTRAVENOUS CONTINUOUS
Status: DISCONTINUED | OUTPATIENT
Start: 2023-04-13 | End: 2023-04-13 | Stop reason: HOSPADM

## 2023-04-13 RX ADMIN — ROCURONIUM BROMIDE 50 MG: 50 INJECTION, SOLUTION INTRAVENOUS at 12:44

## 2023-04-13 RX ADMIN — Medication 2 G: at 12:37

## 2023-04-13 RX ADMIN — PHENYLEPHRINE HYDROCHLORIDE 0.3 MCG/KG/MIN: 10 INJECTION INTRAVENOUS at 12:48

## 2023-04-13 RX ADMIN — PROTAMINE SULFATE 75 MG: 10 INJECTION, SOLUTION INTRAVENOUS at 13:51

## 2023-04-13 RX ADMIN — ONDANSETRON 4 MG: 2 INJECTION INTRAMUSCULAR; INTRAVENOUS at 13:52

## 2023-04-13 RX ADMIN — SODIUM CHLORIDE: 0.9 INJECTION, SOLUTION INTRAVENOUS at 12:37

## 2023-04-13 RX ADMIN — SODIUM CHLORIDE: 0.9 INJECTION, SOLUTION INTRAVENOUS at 14:02

## 2023-04-13 RX ADMIN — ASPIRIN 81 MG: 81 TABLET, CHEWABLE ORAL at 11:44

## 2023-04-13 RX ADMIN — PROPOFOL 120 MG: 10 INJECTION, EMULSION INTRAVENOUS at 12:44

## 2023-04-13 RX ADMIN — FENTANYL CITRATE 100 MCG: 50 INJECTION, SOLUTION INTRAMUSCULAR; INTRAVENOUS at 12:43

## 2023-04-13 RX ADMIN — DEXAMETHASONE SODIUM PHOSPHATE 10 MG: 10 INJECTION, SOLUTION INTRAMUSCULAR; INTRAVENOUS at 12:44

## 2023-04-13 RX ADMIN — SUGAMMADEX 200 MG: 100 INJECTION, SOLUTION INTRAVENOUS at 13:52

## 2023-04-13 RX ADMIN — LIDOCAINE HYDROCHLORIDE 40 MG: 10 INJECTION, SOLUTION INFILTRATION; PERINEURAL at 12:43

## 2023-04-13 ASSESSMENT — ACTIVITIES OF DAILY LIVING (ADL)
ADLS_ACUITY_SCORE: 35

## 2023-04-13 ASSESSMENT — ENCOUNTER SYMPTOMS: DYSRHYTHMIAS: 1

## 2023-04-13 NOTE — DISCHARGE SUMMARY
HEART CARE INPATIENT ENCOUNTER NOTE      Structural Discharge Summary    Primary Care Physician:  Claudia Mandujano    Discharge Provider: Radha Pool CNP     Admission Date: 4/13/2023. Admission Diagnoses: Persistent atrial fibrillation (H) [I48.19]   Discharge Date: April 13, 2023   Disposition: Home   Condition at Discharge: Stable  Code Status: Full Code     Principal Diagnosis:  Persistent atrial fibrillation    Discharge Diagnoses:  Principal Problem:    Persistent atrial fibrillation (H)  Active Problems:    Benign essential hypertension    Cognitive impairment    History of cerebral hemorrhage    Presence of Watchman left atrial appendage closure device    Consult/s: none  Significant Diagnostic Studies:   Procedural GLENN -   Interpretation Summary     Structural GLENN for Left Atrial Occlusion Device     Pre-Device:  1. Normal left ventricular size and systolic function. LVEF:55-60%  2. No left atrial thrombus or spontaneous contrast. Mild left atrial  enlargement.  3. No ASD or PFO by color flow.  4. Trivial pericardial effusion.     Post Device:  1. Well seated 24 mm Watchman FLX device in left atrial appendage by 2D and 3D  imaging. No color Doppler evidence of flow around device at ostium insertion  before or after device release. No change in mitral valve function. No  thrombus noted on device, LA or ADRIANO.     ADRIANO device measurements:  Device compression diameter:  Post deployment  0 degrees: 20.8 mm  45 degrees: 21.2 mm  90 degrees: 21.4 mm  135 degrees: 20.1 mm     2. Normal left ventricular size and systolic function. LVEF:55-60%  3. Small ASD with unidirectional flow (left to right) by color flow doppler.  4. No new post procedural pericardial effusion.    CXR:  PACS Images     Show images for XR Chest Port 1 View     Study Result    Narrative & Impression   EXAM: XR CHEST PORT 1 VIEW  LOCATION: [facility]  DATE/TIME: 4/13/2023 3:50 PM CDT     INDICATION: Status Post  Watchman  COMPARISON: CT chest, 03/18/2023                                                        IMPRESSION: Watchman device is noted within the region of the left atrial appendage. Heart size is prominent. Band-like opacity is seen involving the perihilar regions as well as the inferior aspect of the left upper lobe, likely reflecting atelectasis.   Trace left pleural effusion. There is no pneumothorax.     **CXR personally reviewed and LAAO device is in the expected position**    Treatments: procedures: LAAO implant (Watchman FLX)    Discharge Medications:   Current Discharge Medication List      START taking these medications    Details   aspirin (ASA) 81 MG EC tablet Take 1 tablet (81 mg) by mouth daily Don't take this in addition to your excedrin migraine.    Associated Diagnoses: Presence of Watchman left atrial appendage closure device      clopidogrel (PLAVIX) 75 MG tablet Take 1 tablet (75 mg) by mouth daily  Qty: 90 tablet, Refills: 1    Associated Diagnoses: Presence of Watchman left atrial appendage closure device         CONTINUE these medications which have NOT CHANGED    Details   ARIPiprazole (ABILIFY) 5 MG tablet Take 5 mg by mouth every morning      cloNIDine HCL (CATAPRES) 0.1 MG tablet Take 1 tablet by mouth 2 times daily Hold if BP <100/60      divalproex sodium delayed-release (DEPAKOTE) 250 MG DR tablet Take 250 mg by mouth 2 times daily      DULoxetine HCl 40 MG CPEP Take 40 mg by mouth daily      levothyroxine (SYNTHROID/LEVOTHROID) 137 MCG tablet Take 68.5 mcg by mouth daily      acetaminophen (TYLENOL) 500 MG tablet Take 500-1,000 mg by mouth every 6 hours as needed for pain      aspirin-acetaminophen-caffeine (EXCEDRIN MIGRAINE) 250-250-65 MG tablet Take 1 tablet by mouth every 4 hours as needed for headaches      cholecalciferol, vitamin D3, 50 mcg (2,000 unit) capsule [CHOLECALCIFEROL, VITAMIN D3, 50 MCG (2,000 UNIT) CAPSULE] Take 1 capsule by mouth daily.      cyanocobalamin 1000 MCG  tablet [CYANOCOBALAMIN 1000 MCG TABLET] Take 1,000 mcg by mouth daily.      estradiol (ESTRACE) 0.1 MG/GM vaginal cream Place vaginally three times a week      exemestane (AROMASIN) 25 MG tablet Take 25 mg by mouth daily      losartan (COZAAR) 25 MG tablet Take 1 tablet by mouth daily at 2 pm      Melatonin 10 MG CAPS Take 10 mg by mouth nightly as needed for sleep      memantine (NAMENDA) 5 MG tablet Take 2.5 mg by mouth 2 times daily      potassium chloride ER (KLOR-CON M) 20 MEQ CR tablet Take 20 mEq by mouth daily      rosuvastatin (CRESTOR) 5 MG tablet Take 5 mg by mouth At Bedtime       traZODone (DESYREL) 50 MG tablet Take 50 mg by mouth At Bedtime         STOP taking these medications       apixaban ANTICOAGULANT (ELIQUIS) 5 MG tablet Comments:   Reason for Stopping:             Discharge Instructions:  Follow up appointment with Kristina Cifuentes NP in 45 days (May 25)    Follow up Echocardiogram in 3-4 months (July 19)    Diet: Regular diet. Increase fluids over the next 2 days.   Activity: Activity as tolerated   Restrictions: No lifting >10 lbs or vigorous exercise for 5 days. No driving for 3 days. May return to work in 5 days  Wound / drain care: OK to shower next day. Keep wound clean and dry. Ok to use Ice packs PRN for discomfort. No baths, hot tubs or swimming pools for 5 days.    Hospital Summary:   Ms. Jodee Johnson is a 82 year old female who underwent successful LAAO implant with a 24 mm Watchman FLX device. The patient was recovered in [facility], on bedrest for 4 hours.  The patient then dangled at the edge of bed and ambulated; She voided without difficulty.  Vascular access site remained stable prior to discharge.  Post procedure the patient denied chest pain/pressure, palpitations, or shortness of breath.      Repeat labs were reviewed and were stable.  Activity restrictions and reportable signs and symptoms were discussed with the patient who verbalizes understanding.  She will stop taking  "her Eliquis.  Tomorrow she will start DAPT with ASA 81 mg daily and Plavix 75 mg daily and she will continue this for 6 months, at which time she will stop Plavix and continue ASA indefinitely.     Follow up is arranged as above.     Physical Examination   /62   Pulse 60   Temp 97  F (36.1  C) (Oral)   Resp 17   Ht 1.575 m (5' 2\")   Wt 81.2 kg (179 lb)   SpO2 97%   BMI 32.74 kg/m    Body mass index is 32.74 kg/m .  Wt Readings from Last 3 Encounters:   04/13/23 81.2 kg (179 lb)   04/10/23 81.2 kg (179 lb)   03/01/23 81.6 kg (180 lb)     Intake/Output Summary (Last 24 hours) at 4/13/2023 1410  Last data filed at 4/13/2023 1402  Gross per 24 hour   Intake 500 ml   Output --   Net 500 ml     General Appearance:   no distress, normal body habitus   Chest/Lungs:   Normal respiratory effort. Respirations are even and unlabored. Lungs are clear to auscultation, no rales or wheezing. No chest wall tenderness.    Cardiovascular:   Irregularly irregular. Normal S1, S2 with no murmurs, rubs, or gallops; the carotid, radial, dorsalis pedis and posterior tibial pulses are intact   Abdomen:  soft, non-tender to palpation, non-distended. + bowel sounds.   Extremities: No edema    Skin: Right groin site WNL   Neurologic: Alert and oriented x3, calm and able to move all 4 extremities appropriately            Lab Results    Chemistry/lipid CBC Cardiac Enzymes/BNP/TSH/INR   Creat 4/13/2023 - 0.95 Hgb 4/13/2023 - 14.8 No results found for: CKTOTAL, CKMB, TROPONINI, BNP, TSH, INR               "

## 2023-04-13 NOTE — ANESTHESIA POSTPROCEDURE EVALUATION
Patient: Jodee Johnson    Procedure: Procedure(s):  Left Atrial Appendage Closure       Anesthesia Type:  General    Note:  Disposition: Outpatient   Postop Pain Control: Uneventful            Sign Out: Well controlled pain   PONV: No   Neuro/Psych: Uneventful            Sign Out: Acceptable/Baseline neuro status   Airway/Respiratory: Uneventful            Sign Out: Acceptable/Baseline resp. status   CV/Hemodynamics: Uneventful            Sign Out: Acceptable CV status; No obvious hypovolemia; No obvious fluid overload   Other NRE: NONE   DID A NON-ROUTINE EVENT OCCUR? No    Event details/Postop Comments:  I notified the patient that one of her teeth was damaged with gentle manipulation of the ETT shortly after intubation. She denies pain or discomfort in her mouth or otherwise. I expressed our regret that this occurred, but explained that it is a known risk of endotracheal intubation and GLENN placement. I also expressed my concern to Ms. Bender for her overall dentition. While under general anesthesia we performed a thorough evaluation of her dentition and noted multiple caries and several severely loose teeth. Our findings suggest she is at risk for further dental damage, tooth loss, and possible infection if not managed appropriately by a dentist. Ms. Bender expressed her understanding and all of her questions were answered. I encouraged her to reach out to our team again if more questions arose.           Last vitals:  Vitals Value Taken Time   /62 04/13/23 1435   Temp 36.1  C (97  F) 04/13/23 1420   Pulse 56 04/13/23 1502   Resp 15 04/13/23 1502   SpO2 98 % 04/13/23 1502   Vitals shown include unvalidated device data.    Electronically Signed By: Dick Harvey MD  April 13, 2023  3:04 PM

## 2023-04-13 NOTE — ANESTHESIA CARE TRANSFER NOTE
Patient: Jodee Johnson    Procedure: Procedure(s):  Left Atrial Appendage Closure       Diagnosis: other  Diagnosis Additional Information: No value filed.    Anesthesia Type:   General     Note:    Oropharynx: oropharynx clear of all foreign objects and spontaneously breathing  Level of Consciousness: awake  Oxygen Supplementation: face mask  Level of Supplemental Oxygen (L/min / FiO2): 6  Independent Airway: airway patency satisfactory and stable  Dentition: dentition unchanged  Vital Signs Stable: post-procedure vital signs reviewed and stable  Report to RN Given: handoff report given  Patient transferred to: Card[facility]          Vitals:  Vitals Value Taken Time   /77 04/13/23 1420   Temp 36.1  C (97  F) 04/13/23 1420   Pulse 65 04/13/23 1420   Resp 14 04/13/23 1420   SpO2 96 % 04/13/23 1420       Electronically Signed By: BETO Solano CRNA  April 13, 2023  2:21 PM

## 2023-04-13 NOTE — Clinical Note
0 : 17.8. 45 : 17.8. 90 : 19.7. 135 : 20.6. 0 : 23.5. 45 : 27.1. 90 : 17.1. 135 : 23. 0 : 20.8 . 45 : 21.2 . 90 : 21.4 . 135 : 20.1 . ADRIANO type used: Wind SockPosition: Passed. Kewadin: Passed. Size: Accepted. Seal: Complete. Jet: 0.Watchman Flex 24 mm, exp 11/13/25, [id number], [id number], Lot [id number].

## 2023-04-13 NOTE — ANESTHESIA PREPROCEDURE EVALUATION
Anesthesia Pre-Procedure Evaluation    Patient: Jodee Johnson   MRN: [id number] : 1940        Procedure : Procedure(s):  Left Atrial Appendage Closure          Past Medical History:   Diagnosis Date     Atrial fibrillation (H)      Cancer (H)     breast     Depression      Hemorrhagic cerebrovascular accident (CVA) (H)     due to HTN?     Hyperlipidemia      Hypertension      Hypotension, unspecified hypotension type 2021     Hypothyroidism      Restless leg syndrome      Weight loss 2021      Past Surgical History:   Procedure Laterality Date     LUMPECTOMY BREAST  2022      Allergies   Allergen Reactions     Atenolol Unknown     Other reaction(s): Bradycardia, Other reaction(s): Bradycardia     Hydrochlorothiazide Unknown     Other reaction(s): Hypercalcemia, Other reaction(s): Hypercalcemia     Gabapentin Other (See Comments)     Other reaction(s): Confusion, Other reaction(s): Insomnia, Unknown, insomnia, insomnia     Lisinopril Nausea     Other reaction(s): GI Upset, Other reaction(s): Abdominal Pain     Nortriptyline Unknown     Oxycodone Itching     Paroxetine Unknown     Other reaction(s): Drowsiness, Sedation, Other reaction(s): Sedation     Tetracycline Photosensitivity and Rash     Amlodipine Unknown     Other reaction(s): GI Upset, Vomiting, Other reaction(s): Abdominal Pain     Codeine Itching     Tolerates tylenol 3 at home per pt     Oxycodone-Acetaminophen Itching     Sertraline Unknown     Other reaction(s): Sedation, Other reaction(s): Sedation     Diphenhydramine Anxiety     Other reaction(s): Agitation, Other reaction(s): Unknown      Social History     Tobacco Use     Smoking status: Former     Packs/day: 0.00     Types: Cigarettes     Smokeless tobacco: Never   Vaping Use     Vaping status: Not on file   Substance Use Topics     Alcohol use: Not on file      Wt Readings from Last 1 Encounters:   23 81.2 kg (179 lb)        Anesthesia Evaluation   Pt has had prior  anesthetic. Type: General.    No history of anesthetic complications       ROS/MED HX  ENT/Pulmonary:  - neg pulmonary ROS     Neurologic: Comment: Restless legs syndrome    (+) dementia, CVA,     Cardiovascular: Comment: 1/17/22 Echo:  The left atrium is mildly dilated.  Biplane LVEF is 60%.  Diastolic Doppler findings (E/E' ratio and/or other parameters) suggest left  ventricular filling pressures are indeterminate.  There is mild (1+) mitral regurgitation.  There is mild (1+) aortic regurgitation.  There is trace to mild tricuspid regurgitation.  Doppler findings do not suggest pulmonary hypertension.  The rhythm was atrial fibrillation.    (+) Dyslipidemia hypertension-----dysrhythmias, a-fib,  (-) murmur   METS/Exercise Tolerance:     Hematologic:       Musculoskeletal:       GI/Hepatic:       Renal/Genitourinary:       Endo:     (+) thyroid problem, hypothyroidism, Obesity (BMI 32.74),     Psychiatric/Substance Use:     (+) psychiatric history depression     Infectious Disease:       Malignancy:   (+) Malignancy, History of Breast.Breast CA Remission status post Surgery.        Other:            Physical Exam    Airway        Mallampati: III   TM distance: < 3 FB   Neck ROM: limited   Mouth opening: > 3 cm    Respiratory Devices and Support         Dental       (+) Multiple visibly decayed, broken teeth      Cardiovascular          Rhythm and rate: irregular and bradycardia (-) no murmur    Pulmonary           breath sounds clear to auscultation           OUTSIDE LABS:  CBC:   Lab Results   Component Value Date    WBC 7.5 04/10/2023    HGB 15.9 (H) 04/10/2023    HCT 48.9 (H) 04/10/2023     04/10/2023     BMP:   Lab Results   Component Value Date     04/10/2023     02/17/2022    POTASSIUM 3.9 04/10/2023    POTASSIUM 4.2 02/17/2022    CHLORIDE 104 04/10/2023    CHLORIDE 107 02/17/2022    CO2 29 04/10/2023    CO2 27 02/17/2022    BUN 17.5 04/10/2023    BUN 17 02/17/2022    CR 0.89 04/10/2023     CR 1.0 03/18/2023     (H) 04/10/2023    GLC 94 02/17/2022     COAGS: No results found for: PTT, INR, FIBR  POC: No results found for: BGM, HCG, HCGS  HEPATIC: No results found for: ALBUMIN, PROTTOTAL, ALT, AST, GGT, ALKPHOS, BILITOTAL, BILIDIRECT, POWER  OTHER:   Lab Results   Component Value Date    DANTE 10.1 04/10/2023       Anesthesia Plan    ASA Status:  3   NPO Status:  NPO Appropriate    Anesthesia Type: General.     - Airway: ETT   Induction: Intravenous, Propofol.   Maintenance: Balanced.   Techniques and Equipment:     - Airway: Video-Laryngoscope     - Lines/Monitors: GLENN     Consents    Anesthesia Plan(s) and associated risks, benefits, and realistic alternatives discussed. Questions answered and patient/representative(s) expressed understanding.     - Discussed: Risks, Benefits and Alternatives for BOTH SEDATION and the PROCEDURE were discussed     - Discussed with:  Patient         Postoperative Care            Comments:    Other Comments: GETA.  Glidescope.            Demetri Esquivel MD

## 2023-04-13 NOTE — ANESTHESIA PROCEDURE NOTES
Airway       Patient location during procedure: OR       Procedure Start/Stop Times: 4/13/2023 12:47 PM  Staff -        Anesthesiologist:  Demetri Esquivel MD       CRNA: Erma Rodrigez APRN CRNA       Performed By: CRNA  Consent for Airway        Urgency: elective  Indications and Patient Condition       Indications for airway management: michael-procedural       Induction type:intravenous       Mask difficulty assessment: 1 - vent by mask    Final Airway Details       Final airway type: endotracheal airway       Successful airway: Oral and ETT - single  Endotracheal Airway Details        ETT size (mm): 7.0       Cuffed: yes       Successful intubation technique: video laryngoscopy       VL Blade Size: Glidescope 3       Grade View of Cords: 1       Adjucts: stylet       Position: Right       Measured from: lips       Secured at (cm): 21       Bite block used: None    Post intubation assessment        Placement verified by: capnometry, equal breath sounds and chest rise        Number of attempts at approach: 1       Number of other approaches attempted: 0       Secured with: silk tape       Ease of procedure: easy       Dentition: Dental injury    Medication(s) Administered   Medication Administration Time: 4/13/2023 12:47 PM    Additional Comments       Front right tooth fell out, removed and put in cup

## 2023-04-13 NOTE — INTERVAL H&P NOTE
"I have reviewed the surgical (or preoperative) H&P that is linked to this encounter, and examined the patient. There are no significant changes    Clinical Conditions Present on Arrival:  Clinically Significant Risk Factors Present on Admission                # Drug Induced Coagulation Defect: home medication list includes an anticoagulant medication   # Obesity: Estimated body mass index is 32.74 kg/m  as calculated from the following:    Height as of this encounter: 1.575 m (5' 2\").    Weight as of this encounter: 81.2 kg (179 lb).     Patient seen preprocedure, questions answered and patient agrees to proceed.  Agree with above.  "

## 2023-04-14 ENCOUNTER — TELEPHONE (OUTPATIENT)
Dept: CARDIOLOGY | Facility: CLINIC | Age: 83
End: 2023-04-14
Payer: COMMERCIAL

## 2023-04-14 ENCOUNTER — PREP FOR PROCEDURE (OUTPATIENT)
Dept: CARDIOLOGY | Facility: CLINIC | Age: 83
End: 2023-04-14
Payer: COMMERCIAL

## 2023-04-14 DIAGNOSIS — Z95.818 PRESENCE OF WATCHMAN LEFT ATRIAL APPENDAGE CLOSURE DEVICE: Primary | ICD-10-CM

## 2023-04-14 RX ORDER — LIDOCAINE 40 MG/G
CREAM TOPICAL
Status: CANCELLED | OUTPATIENT
Start: 2023-04-14

## 2023-04-14 RX ORDER — SODIUM CHLORIDE 9 MG/ML
INJECTION, SOLUTION INTRAVENOUS CONTINUOUS
Status: CANCELLED | OUTPATIENT
Start: 2023-04-14

## 2023-04-14 NOTE — TELEPHONE ENCOUNTER
Phone call to patient for post op 24mm Watchman Flx device placement with Dr. Davies/Lillian on 4/13/2023.    Per patient report VSS, LSC, denies SOB, and palpable pedal pulses.    No peripheral edema noted, no abdominal bloating or discomfort. Pt denies any neurological changes at this time.    Pt denies generalized or localized pain at this time.   Pt is having bowel movements and is voiding without difficulty.      Pt right groin has 1 puncture site, is C/D/I, no drainage, slight bruising noted around puncture site, confirmed no suture in place, groin is soft, and pt denies pain at the site.    Bandage was removed from the right groin site without complication and left open to air.    No bleeding, bruise approximately the size of a parish, black and blue in color. No firmness or lumps. No stitch.    Pt continues antiplatelet medications: Daily 75 mg Plavix (Clopidogrel) and daily 81 mg Aspirin.    Reviewed post op LAAC healing process, f/u appts, physical restrictions, nutritional requirements, when to contact the heart clinic, and contact information was given to the pt for further concerns or questions.  Pt verbalized understanding.     Mary Huizar RN BSN  Structural Heart Coordinator   [facility]  [phone]    Information below has been relayed to patient and family member via phone.     Your antiplatelet medication (Aspirin and Plavix):      It is important to remain on your antiplatelet medication uninterrupted after your left atrial occlusion device implant to reduce your risk of a stroke or heart attack, DO NOT STOP THIS MEDICATION.    You will remain on once daily 81 mg Aspirin for the remainder of your life    You will remain on once daily 75 mg Plavix (Clopidogrel) until you are six months from your Watchman Procedure date, approximately October 13, 2023    Healing from your left atrial closure device implant:      Stay well hydrated, it is important to  increase your fluid intake during your recovery period.    Eat foods high in protein to help the healing process.    Gradually increase your activity over the several days, back to baseline;  No aggressive exercise for 5 days post-procedure.    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    No lifting more that 10 lb for 5 days after procedure.    No driving for 3 days post-procedure.     Keep your incision site clean, dry and open to air.    Ok to use ice packs at groin sites for 20 minutes at a time for groin discomfort.     Please delay any dental procedures until 6 weeks post implant. You will not require anti-biotic prophylaxis treatment after this time frame.    If you need urgent dental care prior to the 6 week post-procedure restriction, please contact your cardiologist for prophylactic antibiotic.     Please call me if any of the following occur:      Shortness of breath, dizziness, or chest pain.    Changes in your groin sites including:  Swelling, hardening, drainage, increase in bruising or an increase in pain.          Dial 911 for signs/symptoms of a stroke:      New onset visual disturbance.    New problems with talking/speech.    Smile only occurs on half your face.    Numbness on one side of your body or face.    Sudden headache.    New onset of confusion.    New problems with walking or balance.     Important information regarding your future follow up appointments:      45 Day post-implant follow-up with our Advance Practice Practitioner (EUGENE)    Pre-Procedure History and Physical (H&P) to be completed within 30 days of your GLENN by your Primary Care Provider.    3 month post-implant GLENN to assess your Watchman Device.    You will be contacted after your GLENN is complete to discuss results within 2-3 days by a Structural RN Coordinator.    Thank you,    Mary Huizar, Structural Heart Coordinator -[phone]    After hours please contact the on  call service at [phone]    ----- Message -----  From: Jese Davies MD  Sent: 4/13/2023   9:23 PM CDT  To: Hcc Left Atrial Appendage Closure [facility] team,  I got a call from the patient's significant other tonight stating that they were unable to get the Plavix prescription from the [facility] in [place].  I sent in another prescription, but can 1 of you call and make sure that they are able to fill it.  Thanks  Jese

## 2023-05-24 NOTE — PROGRESS NOTES
[facility]ART CARE [address], [place] [address]   [email or web address]   OFFICE: [phone]       Primary Care: Claudia Mandujano    REASON FOR VISIT: 6 week follow-up    Assessment/Recommendations     Status post left atrial appendage closure: She has a [id number] score of 6 for >75, female gender, history of hemorrhagic stroke, hypertension; HAS-BLED score of 3 for age >75, stroke and bleeding predisposition. She is status post left atrial appendage closure on 4/13/2023 with a 24 mm Watchman FLX device.  Postprocedural GLENN demonstrates no thrombus to left atrial appendage or device.  She was seen in [facility] ED approximately 1 month after implant with generalized weakness, negative CTH, with hypotension and UTI.    Current medications include Plavix 75mg daily and aspirin 81mg daily. She will have post-implant GLENN on 7/19/2023 to confirm device placement and function. She will continue dual antiplatelet therapy until mid October, at which time she may stop Plavix and continue aspirin 81mg daily indefinitely. We will do a 6-month follow-up phone call with her and then see her again in clinic at 1 year post implant.     All questions were answered to her satisfaction and she is ready to proceed with GLENN.    -Continue Plavix 75mg daily and aspirin 81mg daily    MODIFIED JENNIFER SCALE   6 week post-LAAC implant  Previous score: 3    Score Description     0 No symptoms at all   1 No significant disability despite symptoms; able to carry out all usual duties and activities   2 Slight disability; unable to carry out all previous activities, but able to look after own affairs without assistance   3 Moderate disability; requiring some help, but able to walk without assistance   4 Moderately severe disability; unable to walk without assistance and unable to attend to own bodily needs without assistance   5 Severe disability; bedridden, incontinent and requiring constant nursing  care and attention   6 Dead    Total score (0 - 6):  3  Change in score? No   [If yes, notify implanting cardiologist]    Persistent atrial fibrillation: Diagnosed 2021 after incidental detection with controlled ventricular rates.  Asymptomatic. Ventricular rate controlled/borderline slow off AV chrissy blocking agents    Sinus node dysfunction: Numerous pauses >2 seconds, the longest being 4.1 seconds, noted on 2-week MCOT in January. No dizziness/lightheadedness, presyncope or syncope, or falls.  She remains off AV chrissy blocking agents  -Continue interval monitoring    Essential hypertension: History of refractory hypertension. At goal for age on current regimen  -Continue losartan 25 mg daily and clonidine 0.1 mg twice daily    History of hemorrhagic stroke 2015    Follow up: GLENN 7/19/2023, further EP follow up as needed     History of Present Illness/Subjective    Jodee Johnson is a 82 year old female who is seen today for 6-week follow-up after left atrial appendage closure via Watchman device. She has a past medical history significant for persistent atrial fibrillation, hypertension, dyslipidemia, hypothyroidism, left breast cancer (status post lumpectomy 2022 and XRT, now on exemestane), depression and cognitive impairment.  Hemorrhagic stroke 2015 with suspected multiple TIAs since that time.     Her history of atrial fibrillation dates back to 2021 at which time it was detected incidentally.  She has remained largely asymptomatic though she did have occasional lightheadedness with position changes. MCOT in January was significant for pauses greater than 2 seconds during the day and overnight, the longest being 4.1 seconds, recommendations to continue monitoring for progression given that she also remains asymptomatic in this regard. She remains off AV chrissy blocking agents.  She suffered a hemorrhagic stroke in 2015 thought to be secondary to uncontrolled hypertension.  She has an unsteady gait.  She is at  high risk of complications related to chronic oral anticoagulation.  She underwent left atrial appendage closure with 24 mm Watchman FLX device 2023 without initial complications.  Eliquis was discontinued and she was started on low-dose aspirin and Plavix.  Since that time she was seen at [facility] ED 2023 with progressive weakness ongoing over the last several days prior to presentation.  CTH negative for acute pathology.  She was hypotensive and found to have a UTI with weakness improved with IVF.  She is seen today for follow-up with her  Vernon who contributes to her history.    He notes no complications following procedure.  She did have some minor bruising to groin which has resolved.  No blood in urine or stool or spontaneous bruising.  Following her ER visit she has had no further problems with weakness.  She continues to ambulate with assistive devices at home.  Spouse continues to manage her medications. She requires minimal assistance with ADLs. She toilets herself. Patient herself denies chest discomfort, palpitations, shortness of breath or dizziness.     Data Review     EK/10/2023: Atrial fibrillation at 88 bpm, nonspecific ST-T abnormalities to inferior lateral leads, QRS 74 ms, QT/QTc 350/423 ms  2022: Atrial fibrillation with modestly elevated ventricular response at 91 bpm, nonspecific ST-T abnormalities to inferior lateral leads,  QRS 76 ms, QT/QTc 258/317 ms  2021: Atrial fibrillation at 70 bpm  Personally reviewed.     Procedural GLENN: 2023    Pre-Device:  1. Normal left ventricular size and systolic function. LVEF:55-60%  2. No left atrial thrombus or spontaneous contrast. Mild left atrial  enlargement.  3. No ASD or PFO by color flow.  4. Trivial pericardial effusion.     Post Device:  1. Well seated 24 mm Watchman FLX device in left atrial appendage by 2D and 3D  imaging. No color Doppler evidence of flow around device at ostium insertion  before or after  device release. No change in mitral valve function. No  thrombus noted on device, LA or ADRIANO.    ECHOCARDIOGRAM: 1/17/2022  The left atrium is mildly dilated.  Biplane LVEF is 60%.  Diastolic Doppler findings (E/E' ratio and/or other parameters) suggest left  ventricular filling pressures are indeterminate.  There is mild (1+) mitral regurgitation.  There is mild (1+) aortic regurgitation.  There is trace to mild tricuspid regurgitation.  Doppler findings do not suggest pulmonary hypertension.  The rhythm was atrial fibrillation.     MCOT AND/OR EVENT MONITOR:     MCOT 1/5/2023 to 1/18/2023 (monitored duration 10d 11h 3m).  Continous atrial fibrillation with paroxysmal atrial flutter, average 59 bpm.    60 pauses >2 seconds, longest 4.1 seconds at 2146 1/9/2023, pauses noted during daytime and nighttime hours.    Symptom triggers including symptoms not listed correlated to atrial fibrillation at 70 bpm    ADDITIONAL STUDIES:    NMST: 1/5/2023     The nuclear stress test is negative for inducible myocardial ischemia or infarction.     Left ventricular function is normal. The left ventricular ejection fraction at rest is 65%.     A prior study was conducted on 5/9/2014.  Prior images were unavailable for comparison review    I have reviewed and updated the patient's past medical history, allergy list and medication list.  Outside records personally reviewed         Physical Examination Review of Systems   Vitals: /72 (BP Location: Right arm, Patient Position: Sitting, Cuff Size: Adult Regular)   Pulse 68   Resp 20   Wt 79.8 kg (176 lb)   SpO2 96%   BMI 32.19 kg/m      BMI= Body mass index is 32.19 kg/m .    Wt Readings from Last 3 Encounters:   05/25/23 79.8 kg (176 lb)   04/13/23 81.2 kg (179 lb)   04/10/23 81.2 kg (179 lb)       General   Appearance:   Alert and oriented, in no acute distress.    HEENT:  Normocephalic and atraumatic. Conjunctiva and sclera are clear. Moist oral mucosa.    Neck: No JVP,  carotid bruit or obvious thyromegaly.   Lungs:   Respirations unlabored. Clear bilaterally with no rales, rhonchi, or wheezes.     Cardiovascular:   Rhythm is irregular. S1 and S2 are normal. No significant murmur is present. Lower extremities demonstrate no significant edema. Posterior tibial pulses are intact bilaterally.   Extremities: No cyanosis or clubbing   Skin: Skin is warm, dry, and otherwise intact.   Neurologic: Gait not assessed. Mood appropriate. Flat affect.     A 12 point comprehensive review of systems was  negative except as noted.        Medical History  Surgical History Family History Social History   Past Medical History:   Diagnosis Date     Atrial fibrillation (H)      Cancer (H)     breast     Depression      Hemorrhagic cerebrovascular accident (CVA) (H)     due to HTN?     Hyperlipidemia      Hypertension      Hypotension, unspecified hypotension type 8/17/2021     Hypothyroidism      Restless leg syndrome      Weight loss 8/17/2021    Past Surgical History:   Procedure Laterality Date     CV LEFT ATRIAL APPENDAGE CLOSURE N/A 4/13/2023    Procedure: Left Atrial Appendage Closure;  Surgeon: Shavon Hobson MD;  Location: [facility] CV     IR LYMPH NODE BIOPSY  11/3/2021     LUMPECTOMY BREAST  01/20/2022    No family history on file. Social History     Socioeconomic History     Marital status: Single     Spouse name: Not on file     Number of children: Not on file     Years of education: Not on file     Highest education level: Not on file   Occupational History     Not on file   Tobacco Use     Smoking status: Former     Packs/day: 0.00     Types: Cigarettes     Passive exposure: Never     Smokeless tobacco: Never   Vaping Use     Vaping status: Not on file   Substance and Sexual Activity     Alcohol use: Not on file     Drug use: Not on file     Sexual activity: Not on file   Other Topics Concern     Parent/sibling w/ CABG, MI or angioplasty before 65F 55M? Not Asked   Social  History Narrative     Not on file     Social Determinants of Health     Financial Resource Strain: Not on file   Food Insecurity: Not on file   Transportation Needs: Not on file   Physical Activity: Not on file   Stress: Not on file   Social Connections: Not on file   Intimate Partner Violence: Not on file   Housing Stability: Not on file          Medications  Allergies   Scheduled Meds:  Current Outpatient Medications   Medication Sig Dispense Refill     acetaminophen (TYLENOL) 500 MG tablet Take 500-1,000 mg by mouth every 6 hours as needed for pain       ARIPiprazole (ABILIFY) 5 MG tablet Take 5 mg by mouth every morning       aspirin (ASA) 81 MG EC tablet Take 1 tablet (81 mg) by mouth daily Don't take this in addition to your excedrin migraine.       aspirin-acetaminophen-caffeine (EXCEDRIN MIGRAINE) 250-250-65 MG tablet Take 1 tablet by mouth every 4 hours as needed for headaches       cholecalciferol, vitamin D3, 50 mcg (2,000 unit) capsule [CHOLECALCIFEROL, VITAMIN D3, 50 MCG (2,000 UNIT) CAPSULE] Take 1 capsule by mouth daily.       cloNIDine HCL (CATAPRES) 0.1 MG tablet Take 1 tablet by mouth 2 times daily Hold if BP <100/60       clopidogrel (PLAVIX) 75 MG tablet Take 1 tablet (75 mg) by mouth daily 90 tablet 3     divalproex sodium delayed-release (DEPAKOTE) 250 MG DR tablet Take 250 mg by mouth 2 times daily       DULoxetine HCl 40 MG CPEP Take 40 mg by mouth daily       estradiol (ESTRACE) 0.1 MG/GM vaginal cream Place vaginally three times a week       exemestane (AROMASIN) 25 MG tablet Take 25 mg by mouth daily       levothyroxine (SYNTHROID/LEVOTHROID) 137 MCG tablet Take 68.5 mcg by mouth daily       losartan (COZAAR) 25 MG tablet Take 1 tablet by mouth daily at 2 pm       Melatonin 10 MG CAPS Take 10 mg by mouth nightly as needed for sleep       memantine (NAMENDA) 5 MG tablet Take 2.5 mg by mouth 2 times daily       potassium chloride ER (KLOR-CON M) 20 MEQ CR tablet Take 20 mEq by mouth daily        rosuvastatin (CRESTOR) 5 MG tablet Take 5 mg by mouth At Bedtime        traZODone (DESYREL) 50 MG tablet Take 50 mg by mouth At Bedtime      Allergies   Allergen Reactions     Atenolol Unknown     Other reaction(s): Bradycardia, Other reaction(s): Bradycardia     Hydrochlorothiazide Unknown     Other reaction(s): Hypercalcemia, Other reaction(s): Hypercalcemia     Gabapentin Other (See Comments)     Other reaction(s): Confusion, Other reaction(s): Insomnia, Unknown, insomnia, insomnia     Lisinopril Nausea     Other reaction(s): GI Upset, Other reaction(s): Abdominal Pain     Nortriptyline Unknown     Oxycodone Itching     Paroxetine Unknown     Other reaction(s): Drowsiness, Sedation, Other reaction(s): Sedation     Tetracycline Photosensitivity and Rash     Amlodipine Unknown     Other reaction(s): GI Upset, Vomiting, Other reaction(s): Abdominal Pain     Codeine Itching     Tolerates tylenol 3 at home per pt     Oxycodone-Acetaminophen Itching     Sertraline Unknown     Other reaction(s): Sedation, Other reaction(s): Sedation     Diphenhydramine Anxiety     Other reaction(s): Agitation, Other reaction(s): Unknown         Lab Results    Chemistry/lipid CBC Cardiac Enzymes/BNP/TSH/INR   Lab Results   Component Value Date    BUN 17.5 04/10/2023     04/10/2023    CO2 29 04/10/2023    Lab Results   Component Value Date    WBC 7.5 04/10/2023    HGB 14.8 04/13/2023    HCT 48.9 (H) 04/10/2023    MCV 96 04/10/2023     04/10/2023    @RESUFAST(BMP,CBC,BNP,TSH,  INR)@      25 minutes spent reviewing prior records (including documentation, laboratory studies, cardiac testing/imaging), history and physical exam, planning, and subsequent documentation.     This note has been dictated using voice recognition software. Any grammatical, typographical, or context distortions are unintentional and inherent to the software.    Kristina Black, CNP  Clinical Cardiac Electrophysiology  [facility]  [address]  [place] [address]   Office: [phone]  Fax: [phone]

## 2023-05-25 ENCOUNTER — OFFICE VISIT (OUTPATIENT)
Dept: CARDIOLOGY | Facility: CLINIC | Age: 83
End: 2023-05-25
Payer: COMMERCIAL

## 2023-05-25 VITALS
WEIGHT: 176 LBS | HEART RATE: 68 BPM | SYSTOLIC BLOOD PRESSURE: 130 MMHG | OXYGEN SATURATION: 96 % | BODY MASS INDEX: 32.19 KG/M2 | DIASTOLIC BLOOD PRESSURE: 72 MMHG | RESPIRATION RATE: 20 BRPM

## 2023-05-25 DIAGNOSIS — Z95.818 PRESENCE OF WATCHMAN LEFT ATRIAL APPENDAGE CLOSURE DEVICE: ICD-10-CM

## 2023-05-25 DIAGNOSIS — Z86.79 HISTORY OF CEREBRAL HEMORRHAGE: ICD-10-CM

## 2023-05-25 DIAGNOSIS — I10 BENIGN ESSENTIAL HYPERTENSION: ICD-10-CM

## 2023-05-25 DIAGNOSIS — I49.5 SINUS NODE DYSFUNCTION (H): ICD-10-CM

## 2023-05-25 DIAGNOSIS — I48.19 PERSISTENT ATRIAL FIBRILLATION (H): Primary | ICD-10-CM

## 2023-05-25 PROCEDURE — 99213 OFFICE O/P EST LOW 20 MIN: CPT | Performed by: NURSE PRACTITIONER

## 2023-05-25 NOTE — LETTER
5/25/2023    CLAUDIA CRUZ LINWOOD  [facility] [address] [place] [address]    RE: Jodee Johnson       Dear Colleague,     I had the pleasure of seeing Jodee Johnson in the [facility].    [facility]   [address], [place] [address]   [email or web address]   OFFICE: [phone]       Primary Care: Claudia Mandujano    REASON FOR VISIT: 6 week follow-up    Assessment/Recommendations     Status post left atrial appendage closure: She has a [id number] score of 6 for >75, female gender, history of hemorrhagic stroke, hypertension; HAS-BLED score of 3 for age >75, stroke and bleeding predisposition. She is status post left atrial appendage closure on 4/13/2023 with a 24 mm Watchman FLX device.  Postprocedural GLENN demonstrates no thrombus to left atrial appendage or device.  She was seen in [facility] ED approximately 1 month after implant with generalized weakness, negative CTH, with hypotension and UTI.    Current medications include Plavix 75mg daily and aspirin 81mg daily. She will have post-implant GLENN on 7/19/2023 to confirm device placement and function. She will continue dual antiplatelet therapy until mid October, at which time she may stop Plavix and continue aspirin 81mg daily indefinitely. We will do a 6-month follow-up phone call with her and then see her again in clinic at 1 year post implant.     All questions were answered to her satisfaction and she is ready to proceed with GLENN.    -Continue Plavix 75mg daily and aspirin 81mg daily    MODIFIED JENNIFER SCALE   6 week post-LAAC implant  Previous score: 3    Score Description     0 No symptoms at all   1 No significant disability despite symptoms; able to carry out all usual duties and activities   2 Slight disability; unable to carry out all previous activities, but able to look after own affairs without assistance   3 Moderate disability; requiring some help, but able to  walk without assistance   4 Moderately severe disability; unable to walk without assistance and unable to attend to own bodily needs without assistance   5 Severe disability; bedridden, incontinent and requiring constant nursing care and attention   6 Dead    Total score (0 - 6):  3  Change in score? No   [If yes, notify implanting cardiologist]    Persistent atrial fibrillation: Diagnosed 2021 after incidental detection with controlled ventricular rates.  Asymptomatic. Ventricular rate controlled/borderline slow off AV chrissy blocking agents    Sinus node dysfunction: Numerous pauses >2 seconds, the longest being 4.1 seconds, noted on 2-week MCOT in January. No dizziness/lightheadedness, presyncope or syncope, or falls.  She remains off AV chrissy blocking agents  -Continue interval monitoring    Essential hypertension: History of refractory hypertension. At goal for age on current regimen  -Continue losartan 25 mg daily and clonidine 0.1 mg twice daily    History of hemorrhagic stroke 2015    Follow up: GLENN 7/19/2023, further EP follow up as needed     History of Present Illness/Subjective    Jodee Johnson is a 82 year old female who is seen today for 6-week follow-up after left atrial appendage closure via Watchman device. She has a past medical history significant for persistent atrial fibrillation, hypertension, dyslipidemia, hypothyroidism, left breast cancer (status post lumpectomy 2022 and XRT, now on exemestane), depression and cognitive impairment.  Hemorrhagic stroke 2015 with suspected multiple TIAs since that time.     Her history of atrial fibrillation dates back to 2021 at which time it was detected incidentally.  She has remained largely asymptomatic though she did have occasional lightheadedness with position changes. MCOT in January was significant for pauses greater than 2 seconds during the day and overnight, the longest being 4.1 seconds, recommendations to continue monitoring for progression  given that she also remains asymptomatic in this regard. She remains off AV chrissy blocking agents.  She suffered a hemorrhagic stroke in 2015 thought to be secondary to uncontrolled hypertension.  She has an unsteady gait.  She is at high risk of complications related to chronic oral anticoagulation.  She underwent left atrial appendage closure with 24 mm Watchman FLX device 2023 without initial complications.  Eliquis was discontinued and she was started on low-dose aspirin and Plavix.  Since that time she was seen at [facility] ED 2023 with progressive weakness ongoing over the last several days prior to presentation.  CTH negative for acute pathology.  She was hypotensive and found to have a UTI with weakness improved with IVF.  She is seen today for follow-up with her  Vernon who contributes to her history.    He notes no complications following procedure.  She did have some minor bruising to groin which has resolved.  No blood in urine or stool or spontaneous bruising.  Following her ER visit she has had no further problems with weakness.  She continues to ambulate with assistive devices at home.  Spouse continues to manage her medications. She requires minimal assistance with ADLs. She toilets herself. Patient herself denies chest discomfort, palpitations, shortness of breath or dizziness.     Data Review     EK/10/2023: Atrial fibrillation at 88 bpm, nonspecific ST-T abnormalities to inferior lateral leads, QRS 74 ms, QT/QTc 350/423 ms  2022: Atrial fibrillation with modestly elevated ventricular response at 91 bpm, nonspecific ST-T abnormalities to inferior lateral leads,  QRS 76 ms, QT/QTc 258/317 ms  2021: Atrial fibrillation at 70 bpm  Personally reviewed.     Procedural GLENN: 2023    Pre-Device:  1. Normal left ventricular size and systolic function. LVEF:55-60%  2. No left atrial thrombus or spontaneous contrast. Mild left atrial  enlargement.  3. No ASD or PFO by color  flow.  4. Trivial pericardial effusion.     Post Device:  1. Well seated 24 mm Watchman FLX device in left atrial appendage by 2D and 3D  imaging. No color Doppler evidence of flow around device at ostium insertion  before or after device release. No change in mitral valve function. No  thrombus noted on device, LA or ADRIANO.    ECHOCARDIOGRAM: 1/17/2022  The left atrium is mildly dilated.  Biplane LVEF is 60%.  Diastolic Doppler findings (E/E' ratio and/or other parameters) suggest left  ventricular filling pressures are indeterminate.  There is mild (1+) mitral regurgitation.  There is mild (1+) aortic regurgitation.  There is trace to mild tricuspid regurgitation.  Doppler findings do not suggest pulmonary hypertension.  The rhythm was atrial fibrillation.     MCOT AND/OR EVENT MONITOR:     MCOT 1/5/2023 to 1/18/2023 (monitored duration 10d 11h 3m).  Continous atrial fibrillation with paroxysmal atrial flutter, average 59 bpm.    60 pauses >2 seconds, longest 4.1 seconds at 2146 1/9/2023, pauses noted during daytime and nighttime hours.    Symptom triggers including symptoms not listed correlated to atrial fibrillation at 70 bpm    ADDITIONAL STUDIES:    NMST: 1/5/2023    The nuclear stress test is negative for inducible myocardial ischemia or infarction.    Left ventricular function is normal. The left ventricular ejection fraction at rest is 65%.    A prior study was conducted on 5/9/2014.  Prior images were unavailable for comparison review    I have reviewed and updated the patient's past medical history, allergy list and medication list.  Outside records personally reviewed         Physical Examination Review of Systems   Vitals: /72 (BP Location: Right arm, Patient Position: Sitting, Cuff Size: Adult Regular)   Pulse 68   Resp 20   Wt 79.8 kg (176 lb)   SpO2 96%   BMI 32.19 kg/m      BMI= Body mass index is 32.19 kg/m .    Wt Readings from Last 3 Encounters:   05/25/23 79.8 kg (176 lb)   04/13/23  81.2 kg (179 lb)   04/10/23 81.2 kg (179 lb)       General   Appearance:   Alert and oriented, in no acute distress.    HEENT:  Normocephalic and atraumatic. Conjunctiva and sclera are clear. Moist oral mucosa.    Neck: No JVP, carotid bruit or obvious thyromegaly.   Lungs:   Respirations unlabored. Clear bilaterally with no rales, rhonchi, or wheezes.     Cardiovascular:   Rhythm is irregular. S1 and S2 are normal. No significant murmur is present. Lower extremities demonstrate no significant edema. Posterior tibial pulses are intact bilaterally.   Extremities: No cyanosis or clubbing   Skin: Skin is warm, dry, and otherwise intact.   Neurologic: Gait not assessed. Mood appropriate. Flat affect.     A 12 point comprehensive review of systems was  negative except as noted.        Medical History  Surgical History Family History Social History   Past Medical History:   Diagnosis Date    Atrial fibrillation (H)     Cancer (H)     breast    Depression     Hemorrhagic cerebrovascular accident (CVA) (H)     due to HTN?    Hyperlipidemia     Hypertension     Hypotension, unspecified hypotension type 8/17/2021    Hypothyroidism     Restless leg syndrome     Weight loss 8/17/2021    Past Surgical History:   Procedure Laterality Date    CV LEFT ATRIAL APPENDAGE CLOSURE N/A 4/13/2023    Procedure: Left Atrial Appendage Closure;  Surgeon: Shavon Hobson MD;  [facility]    IR LYMPH NODE BIOPSY  11/3/2021    LUMPECTOMY BREAST  01/20/2022    No family history on file. Social History     Socioeconomic History    Marital status: Single     Spouse name: Not on file    Number of children: Not on file    Years of education: Not on file    Highest education level: Not on file   Occupational History    Not on file   Tobacco Use    Smoking status: Former     Packs/day: 0.00     Types: Cigarettes     Passive exposure: Never    Smokeless tobacco: Never   Vaping Use    Vaping status: Not on file   Substance and  Sexual Activity    Alcohol use: Not on file    Drug use: Not on file    Sexual activity: Not on file   Other Topics Concern    Parent/sibling w/ CABG, MI or angioplasty before 65F 55M? Not Asked   Social History Narrative    Not on file     Social Determinants of Health     Financial Resource Strain: Not on file   Food Insecurity: Not on file   Transportation Needs: Not on file   Physical Activity: Not on file   Stress: Not on file   Social Connections: Not on file   Intimate Partner Violence: Not on file   Housing Stability: Not on file          Medications  Allergies   Scheduled Meds:  Current Outpatient Medications   Medication Sig Dispense Refill    acetaminophen (TYLENOL) 500 MG tablet Take 500-1,000 mg by mouth every 6 hours as needed for pain      ARIPiprazole (ABILIFY) 5 MG tablet Take 5 mg by mouth every morning      aspirin (ASA) 81 MG EC tablet Take 1 tablet (81 mg) by mouth daily Don't take this in addition to your excedrin migraine.      aspirin-acetaminophen-caffeine (EXCEDRIN MIGRAINE) 250-250-65 MG tablet Take 1 tablet by mouth every 4 hours as needed for headaches      cholecalciferol, vitamin D3, 50 mcg (2,000 unit) capsule [CHOLECALCIFEROL, VITAMIN D3, 50 MCG (2,000 UNIT) CAPSULE] Take 1 capsule by mouth daily.      cloNIDine HCL (CATAPRES) 0.1 MG tablet Take 1 tablet by mouth 2 times daily Hold if BP <100/60      clopidogrel (PLAVIX) 75 MG tablet Take 1 tablet (75 mg) by mouth daily 90 tablet 3    divalproex sodium delayed-release (DEPAKOTE) 250 MG DR tablet Take 250 mg by mouth 2 times daily      DULoxetine HCl 40 MG CPEP Take 40 mg by mouth daily      estradiol (ESTRACE) 0.1 MG/GM vaginal cream Place vaginally three times a week      exemestane (AROMASIN) 25 MG tablet Take 25 mg by mouth daily      levothyroxine (SYNTHROID/LEVOTHROID) 137 MCG tablet Take 68.5 mcg by mouth daily      losartan (COZAAR) 25 MG tablet Take 1 tablet by mouth daily at 2 pm      Melatonin 10 MG CAPS Take 10 mg by mouth  nightly as needed for sleep      memantine (NAMENDA) 5 MG tablet Take 2.5 mg by mouth 2 times daily      potassium chloride ER (KLOR-CON M) 20 MEQ CR tablet Take 20 mEq by mouth daily      rosuvastatin (CRESTOR) 5 MG tablet Take 5 mg by mouth At Bedtime       traZODone (DESYREL) 50 MG tablet Take 50 mg by mouth At Bedtime      Allergies   Allergen Reactions    Atenolol Unknown     Other reaction(s): Bradycardia, Other reaction(s): Bradycardia    Hydrochlorothiazide Unknown     Other reaction(s): Hypercalcemia, Other reaction(s): Hypercalcemia    Gabapentin Other (See Comments)     Other reaction(s): Confusion, Other reaction(s): Insomnia, Unknown, insomnia, insomnia    Lisinopril Nausea     Other reaction(s): GI Upset, Other reaction(s): Abdominal Pain    Nortriptyline Unknown    Oxycodone Itching    Paroxetine Unknown     Other reaction(s): Drowsiness, Sedation, Other reaction(s): Sedation    Tetracycline Photosensitivity and Rash    Amlodipine Unknown     Other reaction(s): GI Upset, Vomiting, Other reaction(s): Abdominal Pain    Codeine Itching     Tolerates tylenol 3 at home per pt    Oxycodone-Acetaminophen Itching    Sertraline Unknown     Other reaction(s): Sedation, Other reaction(s): Sedation    Diphenhydramine Anxiety     Other reaction(s): Agitation, Other reaction(s): Unknown         Lab Results    Chemistry/lipid CBC Cardiac Enzymes/BNP/TSH/INR   Lab Results   Component Value Date    BUN 17.5 04/10/2023     04/10/2023    CO2 29 04/10/2023    Lab Results   Component Value Date    WBC 7.5 04/10/2023    HGB 14.8 04/13/2023    HCT 48.9 (H) 04/10/2023    MCV 96 04/10/2023     04/10/2023    @RESUFAST(BMP,CBC,BNP,TSH,  INR)@      25 minutes spent reviewing prior records (including documentation, laboratory studies, cardiac testing/imaging), history and physical exam, planning, and subsequent documentation.     This note has been dictated using voice recognition software. Any grammatical,  typographical, or context distortions are unintentional and inherent to the software.    Kristina Black CNP  Clinical Cardiac Electrophysiology  [facility]  [address]  [place] [address]   Office: [phone]  Fax: [phone]       Thank you for allowing me to participate in the care of your patient.      Sincerely,     BETO GRISSOM CNP     [facility]  cc:   No referring provider defined for this encounter.

## 2023-05-25 NOTE — Clinical Note
JORDAN pt seen at [facility] ED 5/17/2023 with progressive weakness ongoing over the last several days prior to presentation; records in Epic.  CTH negative for acute pathology.  She was hypotensive and found to have a UTI.  No significant changes in functional status since that time per her  present at visit today.  Thank you

## 2023-05-25 NOTE — PATIENT INSTRUCTIONS
Jodee Johnson,    It was a pleasure to see you today at the [facility].     My recommendations after this visit include:  -Echocardiogram on 7/19/2023.  -Continue current medications.     Please do not hesitate to call with additional questions or concerns.     Kristina Black, CNP  Clinical Cardiac Electrophysiology  [facility] and Scheduling [phone]  Electrophysiology Nurses [phone]

## 2023-06-19 ENCOUNTER — TELEPHONE (OUTPATIENT)
Dept: CARDIOLOGY | Facility: CLINIC | Age: 83
End: 2023-06-19
Payer: COMMERCIAL

## 2023-06-19 DIAGNOSIS — I48.19 PERSISTENT ATRIAL FIBRILLATION (H): Primary | ICD-10-CM

## 2023-06-19 NOTE — CONFIDENTIAL NOTE
Call placed to patient. Patient stated that she does not have an H&P scheduled with her primary. She reports worsening SOB with exertion. She states that she does not check her BP daily, her weight and has not noticed any swelling in her BLE. Instructed patient that it would be beneficial to schedule a visit with an EP EUGENE to discuss worsening SOB with exertion and would qualify for her H&P needed for GLENN. Patient currently out of town and was given direct number for scheduling to set up appointment. Patient had no further questions. -SC

## 2023-06-28 NOTE — TELEPHONE ENCOUNTER
Call placed to patient and reported that appointment was scheduled with Dr. Mandujano with [facility] on 7/6/23 for H&P for GLENN. Will call and request records after visit have been completed. She did not want to make an appointment with cardiology at this time and will see primary before deciding. -SC

## 2023-07-10 NOTE — TELEPHONE ENCOUNTER
Patient was hospitalized with SBO and has been discharged to TCU. Will need to reschedule GLENN-will forward to scheduling to arrange. -SC

## 2023-07-17 ENCOUNTER — TELEPHONE (OUTPATIENT)
Dept: CARDIOLOGY | Facility: CLINIC | Age: 83
End: 2023-07-17
Payer: COMMERCIAL

## 2023-07-17 NOTE — TELEPHONE ENCOUNTER
Outgoing call to patient. Left message with . Per last conversation on 6/19/2023 discussed with both  and with patient that they should set up a follow up with EP EUGENE to discuss this shortness of breath and was given phone number to call scheduling to set up however this was not completed and decided to see her primary MD before scheduling with cardiology. LM with direct call back to discuss. -SC    ----- Message -----  From: Sherie Schilling  Sent: 7/17/2023  12:13 PM CDT  To: Hcc Left Atrial Appendage Closure Pool - [facility]    Hey,  I called patients  to reschedule the 3 mo GLENN.  He brought up patient being short of breath and it has gotten worse since she had the Watchman placed.  He said it wasn't so bad at her 6 week follow up with Kristina so they didn't really discuss it.  He did also say that they brought it up to her PCP and the PCP wasn't sure what was going on?  Due to him thinking it may be from the Watchman, do you mind calling just to discuss further?    Thank you!  Sherie

## 2023-07-17 NOTE — TELEPHONE ENCOUNTER
----- Message -----  From: Sherie Schilling  Sent: 7/17/2023  12:06 PM CDT  To: Rula Pimentel RN    Talked to patients significant other.  He will have her call us directly when she is ready to reschedule.  She is still pretty weak and may be going to a nursing home.  ----- Message -----  From: Rula Pimentel RN  Sent: 7/10/2023  12:46 PM CDT  To: Sherie Schilling    ----- Message from Rula Pimentel RN sent at 7/10/2023 12:46 PM CDT -----  Lawrence Rehman-  Pt was hospitalized and GLENN was canceled. She will need this rescheduled when she is feeling up to it!  Thanks!  Rula

## 2023-07-20 NOTE — TELEPHONE ENCOUNTER
Outgoing call to patients . He reported that patient is still currently in TCU. He is wondering if her shortness of breath she is having is related to her watchman. I updated him that we had discussed this previously and suggested he come to the clinic to follow up with one of our providers regarding concerns for shortness of breath. Also asked if he had brought these concerns up to the TCU MD as she is currently being followed by those providers. He stated he has not however will call the facility to speak to the nurse to relay this information. He states it is hard for him to get patient in and out of the car right now so he isnt sure when he will be able to schedule her GLENN or an appointment at the clinic. Will call  back in 2 weeks to check on patients status and follow up on scheduling GLENN.

## 2023-07-26 ENCOUNTER — TELEPHONE (OUTPATIENT)
Dept: CARDIOLOGY | Facility: CLINIC | Age: 83
End: 2023-07-26
Payer: COMMERCIAL

## 2023-07-26 DIAGNOSIS — Z95.818 PRESENCE OF WATCHMAN LEFT ATRIAL APPENDAGE CLOSURE DEVICE: Primary | ICD-10-CM

## 2023-07-26 NOTE — TELEPHONE ENCOUNTER
----- Message -----  From: Audie Pena MD  Sent: 7/26/2023  11:52 AM CDT  To: Rula Pimentel RN  Subject: RE: Follow up.                                   Thank you.  ----- Message -----  From: Rula Pimentel RN  Sent: 7/26/2023  10:33 AM CDT  To: Audie Pena MD  Subject: RE: Follow up.                                   Pt is scheduled to be seen 8/8/23 by Dr. Hobson to address shortness of breath. Called TCU and per -TCU MD's are aware but feel it is Anxiety related but encouraged her coming in to address if the  is concerned.   Rula Espinal    ----- Message -----  From: Audie Pena MD  Sent: 7/22/2023  10:50 PM CDT  To: Rula Pimentel RN  Subject: Follow up.                                       Hi   I'm out of the office and country but read your recent note. If you would please call the tcu physician and follow up regarding the husbands concerns related to shortness of breath. I look forward to an update. Thanks. JAZZY Pena

## 2023-07-26 NOTE — TELEPHONE ENCOUNTER
Incoming call from patient regarding need for H&P for GLENN. Explained that she is in TCU and can coordinate her GLENN with having the MD complete an H&P while she is there. Vernon gave me the number for the  at the [facility]-Her name is Deanna and her number is [phone]. Asked  regarding complaint of patient being short of breath-he reports that it has been worse since her recent surgery. He states he is able to get her into the car so writer suggested that patient come into the clinic to be seen by an EP provider to address this. He was hard transferred to scheduling and appointment was made for 8/8/23. Call placed to  and talked to her. Brought up shortness of breath and she stated the provider there has addressed and is aware however they feel it is more anxiety related and deconditioning related. She stated that we can schedule the GLENN anytime and to let her know when this is scheduled for and then provider at TCU can complete the H&P. Routed to Sherie to schedule and will update  when date is. -SC

## 2023-07-26 NOTE — TELEPHONE ENCOUNTER
Outgoing call to Deanna at TCU. Updated with dates of both Dr. Hobson appointments and also GLENN appointment. She would like Non-Invasive to call her with instructions prior to GLENN to relay to nursing team. Sent reminder message to self to message NI to remind. She had no further questions. -SC    ----- Message -----  From: Sherie Schilling  Sent: 7/26/2023  12:46 PM CDT  To: Rula Pimentel RN  Subject: RE: Re-schedule Post LAAC GLENN                    8/15 GLENN  She does have a visit with Dr. Root on 8/8 that we could use for an H&P  ----- Message -----  From: Rula Pimentel RN  Sent: 7/26/2023  10:32 AM CDT  To: Sherie Schilling  Subject: Re-schedule Post LAAC GLENN                        Hey!   OK to schedule pt for GLENN with  Vernon-she can go anytime as this was canceled for 7/19/23. Let me know and I will make sure [facility] completes H&P so let Vernon know that he wont have to worry about this.   Thanks!  Rula

## 2023-08-08 ENCOUNTER — OFFICE VISIT (OUTPATIENT)
Dept: CARDIOLOGY | Facility: CLINIC | Age: 83
End: 2023-08-08
Payer: COMMERCIAL

## 2023-08-08 VITALS
BODY MASS INDEX: 30.91 KG/M2 | HEART RATE: 77 BPM | DIASTOLIC BLOOD PRESSURE: 80 MMHG | WEIGHT: 169 LBS | OXYGEN SATURATION: 94 % | RESPIRATION RATE: 16 BRPM | SYSTOLIC BLOOD PRESSURE: 112 MMHG

## 2023-08-08 DIAGNOSIS — I48.19 PERSISTENT ATRIAL FIBRILLATION (H): ICD-10-CM

## 2023-08-08 DIAGNOSIS — Z95.818 PRESENCE OF WATCHMAN LEFT ATRIAL APPENDAGE CLOSURE DEVICE: ICD-10-CM

## 2023-08-08 DIAGNOSIS — I48.91 ATRIAL FIBRILLATION, UNSPECIFIED TYPE (H): ICD-10-CM

## 2023-08-08 PROCEDURE — 99214 OFFICE O/P EST MOD 30 MIN: CPT | Performed by: INTERNAL MEDICINE

## 2023-08-08 NOTE — LETTER
8/8/2023    YO PALUMBO  [facility] [address] [facility] [address]    RE: Jodee Johnson       Dear Colleague,     I had the pleasure of seeing Jodee Johnson in the [facility].    HEART CARE ENCOUNTER CONSULTATON NOTE      [facility]  [phone]      Assessment/Recommendations   Assessment/Plan:    Jodee Johnson is a very pleasant 83 year old female with persistent atrial fibrillation s/p LAAO device, hypertension, dyslipidemia, hypothyroidism, left breast cancer (status post lumpectomy 2022 and XRT, now on exemestane), depression and cognitive impairment.  Hemorrhagic stroke 2015 with suspected multiple TIAs who presents today to the EP clinic.    Permanent atrial fibrillation and flutter  - Asymptomatic from AF stand point  - rates controlled currently    2. Anticoagulation  - s/p Watchman device  - Follow up GLENN next week  - continue Aspirin and Plavix for 6 months and then Aspirin alone for life    3. Hypertension  - Well controlled    Time spent: 30 minutes spent on the date of the encounter doing chart review, history and exam, documentation and further activities as noted above.       History of Present Illness/Subjective    HPI: Jodee Johnson is a very pleasant 83 year old female with persistent atrial fibrillation s/p LAAO device, hypertension, dyslipidemia, hypothyroidism, left breast cancer (status post lumpectomy 2022 and XRT, now on exemestane), depression and cognitive impairment.  Hemorrhagic stroke 2015 with suspected multiple TIAs who presents today to the EP clinic.    Her history of atrial fibrillation dates back to 2021 at which time it was detected incidentally.  She has remained largely asymptomatic  from AF stand point.     She underwent left atrial appendage closure with 24 mm Watchman FLX device 4/13/2023 without initial complications. Eliquis was discontinued and she was started on low-dose aspirin and Plavix.        Procedural GLENN: 4/13/2023     Pre-Device:  1. Normal left ventricular size and systolic function. LVEF:55-60%  2. No left atrial thrombus or spontaneous contrast. Mild left atrial  enlargement.  3. No ASD or PFO by color flow.  4. Trivial pericardial effusion.     Post Device:  1. Well seated 24 mm Watchman FLX device in left atrial appendage by 2D and 3D  imaging. No color Doppler evidence of flow around device at ostium insertion  before or after device release. No change in mitral valve function. No  thrombus noted on device, LA or ADRIANO.     ECHOCARDIOGRAM: 1/17/2022  The left atrium is mildly dilated.  Biplane LVEF is 60%.  Diastolic Doppler findings (E/E' ratio and/or other parameters) suggest left  ventricular filling pressures are indeterminate.  There is mild (1+) mitral regurgitation.  There is mild (1+) aortic regurgitation.  There is trace to mild tricuspid regurgitation.  Doppler findings do not suggest pulmonary hypertension.  The rhythm was atrial fibrillation.     MCOT AND/OR EVENT MONITOR:      MCOT 1/5/2023 to 1/18/2023 (monitored duration 10d 11h 3m).  Continous atrial fibrillation with paroxysmal atrial flutter, average 59 bpm.    60 pauses >2 seconds, longest 4.1 seconds at 2146 1/9/2023, pauses noted during daytime and nighttime hours.    Symptom triggers including symptoms not listed correlated to atrial fibrillation at 70 bpm     ADDITIONAL STUDIES:     NMST: 1/5/2023    The nuclear stress test is negative for inducible myocardial ischemia or infarction.    Left ventricular function is normal. The left ventricular ejection fraction at rest is 65%.    A prior study was conducted on 5/9/2014.  Prior images were unavailable for comparison review      Labs below reviewed personally     Physical Examination  Review of Systems   Vitals: /80 (BP Location: Left arm, Patient Position: Sitting, Cuff Size: Adult Regular)   Pulse 77   Resp 16   Wt 76.7 kg (169 lb)   SpO2 94%   BMI 30.91  kg/m    BMI= Body mass index is 30.91 kg/m .  Wt Readings from Last 3 Encounters:   08/08/23 76.7 kg (169 lb)   05/25/23 79.8 kg (176 lb)   04/13/23 81.2 kg (179 lb)       General Appearance:   no distress, normal body habitus   ENT/Mouth: membranes moist, no oral lesions or bleeding gums.      EYES:  no scleral icterus, normal conjunctivae   Neck: no carotid bruits or thyromegaly   Chest/Lungs:   lungs are clear to auscultation, no rales or wheezing, no sternal scar, equal chest wall expansion    Cardiovascular:   Irregular. Normal first and second heart sounds with no murmurs, rubs, or gallops; the carotid, radial and posterior tibial pulses are intact, no edema bilaterally    Abdomen:  no organomegaly, masses, bruits, or tenderness; bowel sounds are present   Extremities: no cyanosis or clubbing   Skin: no xanthelasma, warm.    Neurologic: normal  bilateral, no tremors     Psychiatric: alert and oriented x3, calm        Please refer above for cardiac ROS details.        Medical History  Surgical History Family History Social History   Past Medical History:   Diagnosis Date    Atrial fibrillation (H)     Cancer (H)     breast    Depression     Hemorrhagic cerebrovascular accident (CVA) (H)     due to HTN?    Hyperlipidemia     Hypertension     Hypotension, unspecified hypotension type 8/17/2021    Hypothyroidism     Restless leg syndrome     Weight loss 8/17/2021     Past Surgical History:   Procedure Laterality Date    CV LEFT ATRIAL APPENDAGE CLOSURE N/A 4/13/2023    Procedure: Left Atrial Appendage Closure;  Surgeon: Shavon Hobson MD;  Location: [facility] CV    IR LYMPH NODE BIOPSY  11/3/2021    LUMPECTOMY BREAST  01/20/2022     No family history on file.     Social History     Socioeconomic History    Marital status: Single     Spouse name: Not on file    Number of children: Not on file    Years of education: Not on file    Highest education level: Not on file   Occupational History    Not on  file   Tobacco Use    Smoking status: Former     Packs/day: 0.00     Types: Cigarettes     Passive exposure: Never    Smokeless tobacco: Never   Substance and Sexual Activity    Alcohol use: Not on file    Drug use: Not on file    Sexual activity: Not on file   Other Topics Concern    Parent/sibling w/ CABG, MI or angioplasty before 65F 55M? Not Asked   Social History Narrative    Not on file     Social Determinants of Health     Financial Resource Strain: Not on file   Food Insecurity: Not on file   Transportation Needs: Not on file   Physical Activity: Not on file   Stress: Not on file   Social Connections: Not on file   Intimate Partner Violence: Not on file   Housing Stability: Not on file           Medications  Allergies   Current Outpatient Medications   Medication Sig Dispense Refill    acetaminophen (TYLENOL) 500 MG tablet Take 500-1,000 mg by mouth every 6 hours as needed for pain      ARIPiprazole (ABILIFY) 5 MG tablet Take 5 mg by mouth every morning      aspirin (ASA) 81 MG EC tablet Take 1 tablet (81 mg) by mouth daily Don't take this in addition to your excedrin migraine.      aspirin-acetaminophen-caffeine (EXCEDRIN MIGRAINE) 250-250-65 MG tablet Take 1 tablet by mouth every 4 hours as needed for headaches      cholecalciferol, vitamin D3, 50 mcg (2,000 unit) capsule [CHOLECALCIFEROL, VITAMIN D3, 50 MCG (2,000 UNIT) CAPSULE] Take 1 capsule by mouth daily.      cloNIDine HCL (CATAPRES) 0.1 MG tablet Take 1 tablet by mouth 2 times daily Hold if BP <100/60      clopidogrel (PLAVIX) 75 MG tablet Take 1 tablet (75 mg) by mouth daily 90 tablet 3    divalproex sodium delayed-release (DEPAKOTE) 250 MG DR tablet Take 250 mg by mouth 2 times daily      DULoxetine HCl 40 MG CPEP Take 40 mg by mouth daily      estradiol (ESTRACE) 0.1 MG/GM vaginal cream Place vaginally three times a week      exemestane (AROMASIN) 25 MG tablet Take 25 mg by mouth daily      levothyroxine (SYNTHROID/LEVOTHROID) 137 MCG tablet Take  68.5 mcg by mouth daily      losartan (COZAAR) 25 MG tablet Take 1 tablet by mouth daily at 2 pm      Melatonin 10 MG CAPS Take 10 mg by mouth nightly as needed for sleep      memantine (NAMENDA) 5 MG tablet Take 2.5 mg by mouth 2 times daily      potassium chloride ER (KLOR-CON M) 20 MEQ CR tablet Take 20 mEq by mouth daily      rosuvastatin (CRESTOR) 5 MG tablet Take 5 mg by mouth At Bedtime       traZODone (DESYREL) 50 MG tablet Take 50 mg by mouth At Bedtime         Allergies   Allergen Reactions    Atenolol Unknown     Other reaction(s): Bradycardia, Other reaction(s): Bradycardia    Hydrochlorothiazide Unknown     Other reaction(s): Hypercalcemia, Other reaction(s): Hypercalcemia    Gabapentin Other (See Comments)     Other reaction(s): Confusion, Other reaction(s): Insomnia, Unknown, insomnia, insomnia    Lisinopril Nausea     Other reaction(s): GI Upset, Other reaction(s): Abdominal Pain    Nortriptyline Unknown    Oxycodone Itching    Paroxetine Unknown     Other reaction(s): Drowsiness, Sedation, Other reaction(s): Sedation    Tetracycline Photosensitivity and Rash    Amlodipine Unknown     Other reaction(s): GI Upset, Vomiting, Other reaction(s): Abdominal Pain    Codeine Itching     Tolerates tylenol 3 at home per pt    Oxycodone-Acetaminophen Itching    Sertraline Unknown     Other reaction(s): Sedation, Other reaction(s): Sedation    Diphenhydramine Anxiety     Other reaction(s): Agitation, Other reaction(s): Unknown          Lab Results    Chemistry/lipid CBC Cardiac Enzymes/BNP/TSH/INR   No results for input(s): CHOL, HDL, LDL, TRIG, CHOLHDLRATIO in the last 33795 hours.  No results for input(s): LDL in the last 94858 hours.  Recent Labs   Lab Test 04/13/23  1443 04/10/23  0935   NA  --  142   POTASSIUM  --  3.9   CHLORIDE  --  104   CO2  --  29   GLC  --  129*   BUN  --  17.5   CR 0.95 0.89   GFRESTIMATED 60* 64   DANTE  --  10.1     Recent Labs   Lab Test 04/13/23  1443 04/10/23  0935 03/18/23  0759    CR 0.95 0.89 1.0     No results for input(s): A1C in the last 47103 hours.       Recent Labs   Lab Test 04/13/23  1443 04/10/23  0935   WBC  --  7.5   HGB 14.8 15.9*   HCT  --  48.9*   MCV  --  96   PLT  --  243     Recent Labs   Lab Test 04/13/23  1443 04/10/23  0935   HGB 14.8 15.9*    No results for input(s): TROPONINI in the last 98827 hours.  No results for input(s): BNP, NTBNPI, NTBNP in the last 15562 hours.  No results for input(s): TSH in the last 49248 hours.  No results for input(s): INR in the last 64451 hours.     Shavon Hobson MD                                  ]      Thank you for allowing me to participate in the care of your patient.      Sincerely,     Shavon Hobson MD     [facility]  cc:   Audie Pena MD  [address] [facility]  [address]  [place] [address]

## 2023-08-08 NOTE — PROGRESS NOTES
HEART CARE ENCOUNTER CONSULTATON NOTE      [facility]  [phone]      Assessment/Recommendations   Assessment/Plan:    Jodee Johnson is a very pleasant 83 year old female with persistent atrial fibrillation s/p LAAO device, hypertension, dyslipidemia, hypothyroidism, left breast cancer (status post lumpectomy 2022 and XRT, now on exemestane), depression and cognitive impairment.  Hemorrhagic stroke 2015 with suspected multiple TIAs who presents today to the EP clinic.    Permanent atrial fibrillation and flutter  - Asymptomatic from AF stand point  - rates controlled currently    2. Anticoagulation  - s/p Watchman device  - Follow up GLENN next week  - continue Aspirin and Plavix for 6 months and then Aspirin alone for life    3. Hypertension  - Well controlled    Time spent: 30 minutes spent on the date of the encounter doing chart review, history and exam, documentation and further activities as noted above.       History of Present Illness/Subjective    HPI: Jodee Johnson is a very pleasant 83 year old female with persistent atrial fibrillation s/p LAAO device, hypertension, dyslipidemia, hypothyroidism, left breast cancer (status post lumpectomy 2022 and XRT, now on exemestane), depression and cognitive impairment.  Hemorrhagic stroke 2015 with suspected multiple TIAs who presents today to the EP clinic.    Her history of atrial fibrillation dates back to 2021 at which time it was detected incidentally.  She has remained largely asymptomatic  from AF stand point.     She underwent left atrial appendage closure with 24 mm Watchman FLX device 4/13/2023 without initial complications. Eliquis was discontinued and she was started on low-dose aspirin and Plavix.       Procedural GLENN: 4/13/2023     Pre-Device:  1. Normal left ventricular size and systolic function. LVEF:55-60%  2. No left atrial thrombus or spontaneous contrast. Mild left atrial  enlargement.  3. No ASD or PFO by color  flow.  4. Trivial pericardial effusion.     Post Device:  1. Well seated 24 mm Watchman FLX device in left atrial appendage by 2D and 3D  imaging. No color Doppler evidence of flow around device at ostium insertion  before or after device release. No change in mitral valve function. No  thrombus noted on device, LA or ADRIANO.     ECHOCARDIOGRAM: 1/17/2022  The left atrium is mildly dilated.  Biplane LVEF is 60%.  Diastolic Doppler findings (E/E' ratio and/or other parameters) suggest left  ventricular filling pressures are indeterminate.  There is mild (1+) mitral regurgitation.  There is mild (1+) aortic regurgitation.  There is trace to mild tricuspid regurgitation.  Doppler findings do not suggest pulmonary hypertension.  The rhythm was atrial fibrillation.     MCOT AND/OR EVENT MONITOR:      MCOT 1/5/2023 to 1/18/2023 (monitored duration 10d 11h 3m).  Continous atrial fibrillation with paroxysmal atrial flutter, average 59 bpm.    60 pauses >2 seconds, longest 4.1 seconds at 2146 1/9/2023, pauses noted during daytime and nighttime hours.    Symptom triggers including symptoms not listed correlated to atrial fibrillation at 70 bpm     ADDITIONAL STUDIES:     NMST: 1/5/2023    The nuclear stress test is negative for inducible myocardial ischemia or infarction.    Left ventricular function is normal. The left ventricular ejection fraction at rest is 65%.    A prior study was conducted on 5/9/2014.  Prior images were unavailable for comparison review      Labs below reviewed personally     Physical Examination  Review of Systems   Vitals: /80 (BP Location: Left arm, Patient Position: Sitting, Cuff Size: Adult Regular)   Pulse 77   Resp 16   Wt 76.7 kg (169 lb)   SpO2 94%   BMI 30.91 kg/m    BMI= Body mass index is 30.91 kg/m .  Wt Readings from Last 3 Encounters:   08/08/23 76.7 kg (169 lb)   05/25/23 79.8 kg (176 lb)   04/13/23 81.2 kg (179 lb)       General Appearance:   no distress, normal body habitus    ENT/Mouth: membranes moist, no oral lesions or bleeding gums.      EYES:  no scleral icterus, normal conjunctivae   Neck: no carotid bruits or thyromegaly   Chest/Lungs:   lungs are clear to auscultation, no rales or wheezing, no sternal scar, equal chest wall expansion    Cardiovascular:   Irregular. Normal first and second heart sounds with no murmurs, rubs, or gallops; the carotid, radial and posterior tibial pulses are intact, no edema bilaterally    Abdomen:  no organomegaly, masses, bruits, or tenderness; bowel sounds are present   Extremities: no cyanosis or clubbing   Skin: no xanthelasma, warm.    Neurologic: normal  bilateral, no tremors     Psychiatric: alert and oriented x3, calm        Please refer above for cardiac ROS details.        Medical History  Surgical History Family History Social History   Past Medical History:   Diagnosis Date    Atrial fibrillation (H)     Cancer (H)     breast    Depression     Hemorrhagic cerebrovascular accident (CVA) (H)     due to HTN?    Hyperlipidemia     Hypertension     Hypotension, unspecified hypotension type 8/17/2021    Hypothyroidism     Restless leg syndrome     Weight loss 8/17/2021     Past Surgical History:   Procedure Laterality Date    CV LEFT ATRIAL APPENDAGE CLOSURE N/A 4/13/2023    Procedure: Left Atrial Appendage Closure;  Surgeon: Shavon Hobson MD;  Location: [facility] CV    IR LYMPH NODE BIOPSY  11/3/2021    LUMPECTOMY BREAST  01/20/2022     No family history on file.     Social History     Socioeconomic History    Marital status: Single     Spouse name: Not on file    Number of children: Not on file    Years of education: Not on file    Highest education level: Not on file   Occupational History    Not on file   Tobacco Use    Smoking status: Former     Packs/day: 0.00     Types: Cigarettes     Passive exposure: Never    Smokeless tobacco: Never   Substance and Sexual Activity    Alcohol use: Not on file    Drug use: Not on  file    Sexual activity: Not on file   Other Topics Concern    Parent/sibling w/ CABG, MI or angioplasty before 65F 55M? Not Asked   Social History Narrative    Not on file     Social Determinants of Health     Financial Resource Strain: Not on file   Food Insecurity: Not on file   Transportation Needs: Not on file   Physical Activity: Not on file   Stress: Not on file   Social Connections: Not on file   Intimate Partner Violence: Not on file   Housing Stability: Not on file           Medications  Allergies   Current Outpatient Medications   Medication Sig Dispense Refill    acetaminophen (TYLENOL) 500 MG tablet Take 500-1,000 mg by mouth every 6 hours as needed for pain      ARIPiprazole (ABILIFY) 5 MG tablet Take 5 mg by mouth every morning      aspirin (ASA) 81 MG EC tablet Take 1 tablet (81 mg) by mouth daily Don't take this in addition to your excedrin migraine.      aspirin-acetaminophen-caffeine (EXCEDRIN MIGRAINE) 250-250-65 MG tablet Take 1 tablet by mouth every 4 hours as needed for headaches      cholecalciferol, vitamin D3, 50 mcg (2,000 unit) capsule [CHOLECALCIFEROL, VITAMIN D3, 50 MCG (2,000 UNIT) CAPSULE] Take 1 capsule by mouth daily.      cloNIDine HCL (CATAPRES) 0.1 MG tablet Take 1 tablet by mouth 2 times daily Hold if BP <100/60      clopidogrel (PLAVIX) 75 MG tablet Take 1 tablet (75 mg) by mouth daily 90 tablet 3    divalproex sodium delayed-release (DEPAKOTE) 250 MG DR tablet Take 250 mg by mouth 2 times daily      DULoxetine HCl 40 MG CPEP Take 40 mg by mouth daily      estradiol (ESTRACE) 0.1 MG/GM vaginal cream Place vaginally three times a week      exemestane (AROMASIN) 25 MG tablet Take 25 mg by mouth daily      levothyroxine (SYNTHROID/LEVOTHROID) 137 MCG tablet Take 68.5 mcg by mouth daily      losartan (COZAAR) 25 MG tablet Take 1 tablet by mouth daily at 2 pm      Melatonin 10 MG CAPS Take 10 mg by mouth nightly as needed for sleep      memantine (NAMENDA) 5 MG tablet Take 2.5 mg by  mouth 2 times daily      potassium chloride ER (KLOR-CON M) 20 MEQ CR tablet Take 20 mEq by mouth daily      rosuvastatin (CRESTOR) 5 MG tablet Take 5 mg by mouth At Bedtime       traZODone (DESYREL) 50 MG tablet Take 50 mg by mouth At Bedtime         Allergies   Allergen Reactions    Atenolol Unknown     Other reaction(s): Bradycardia, Other reaction(s): Bradycardia    Hydrochlorothiazide Unknown     Other reaction(s): Hypercalcemia, Other reaction(s): Hypercalcemia    Gabapentin Other (See Comments)     Other reaction(s): Confusion, Other reaction(s): Insomnia, Unknown, insomnia, insomnia    Lisinopril Nausea     Other reaction(s): GI Upset, Other reaction(s): Abdominal Pain    Nortriptyline Unknown    Oxycodone Itching    Paroxetine Unknown     Other reaction(s): Drowsiness, Sedation, Other reaction(s): Sedation    Tetracycline Photosensitivity and Rash    Amlodipine Unknown     Other reaction(s): GI Upset, Vomiting, Other reaction(s): Abdominal Pain    Codeine Itching     Tolerates tylenol 3 at home per pt    Oxycodone-Acetaminophen Itching    Sertraline Unknown     Other reaction(s): Sedation, Other reaction(s): Sedation    Diphenhydramine Anxiety     Other reaction(s): Agitation, Other reaction(s): Unknown          Lab Results    Chemistry/lipid CBC Cardiac Enzymes/BNP/TSH/INR   No results for input(s): CHOL, HDL, LDL, TRIG, CHOLHDLRATIO in the last 76266 hours.  No results for input(s): LDL in the last 59941 hours.  Recent Labs   Lab Test 04/13/23 1443 04/10/23  0935   NA  --  142   POTASSIUM  --  3.9   CHLORIDE  --  104   CO2  --  29   GLC  --  129*   BUN  --  17.5   CR 0.95 0.89   GFRESTIMATED 60* 64   DANTE  --  10.1     Recent Labs   Lab Test 04/13/23  1443 04/10/23  0935 03/18/23  0759   CR 0.95 0.89 1.0     No results for input(s): A1C in the last 86238 hours.       Recent Labs   Lab Test 04/13/23  1443 04/10/23  0935   WBC  --  7.5   HGB 14.8 15.9*   HCT  --  48.9*   MCV  --  96   PLT  --  243      Recent Labs   Lab Test 04/13/23  1443 04/10/23  0935   HGB 14.8 15.9*    No results for input(s): TROPONINI in the last 41287 hours.  No results for input(s): BNP, NTBNPI, NTBNP in the last 12421 hours.  No results for input(s): TSH in the last 86436 hours.  No results for input(s): INR in the last 61426 hours.     Shavon Hobson MD                                  ]

## 2023-08-08 NOTE — PATIENT INSTRUCTIONS
[facility]  Cardiac Electrophysiology  [address]  [place] [address]   Office: [phone]  Fax: [phone]       Thank you for seeing us in clinic today - it is a pleasure to be a part of your care team.  Below is a summary of our plan from today's visit.      Atrial fibrillation  - status post Watchman procedure  - keep the appointment for next week to get the ultrasound of the heart  - continue all meds    Please do not hesitate to be in touch with our office at [phone] with any questions that may arise.      Thank you for trusting us with your care,    Shavon Hobson MD  Clinical Cardiac Electrophysiology  [facility]  [address]  [place] [id number]   Office: [phone]  Fax: [phone]

## 2023-08-15 ENCOUNTER — HOSPITAL ENCOUNTER (OUTPATIENT)
Dept: CARDIOLOGY | Facility: HOSPITAL | Age: 83
Discharge: OTHER INSTITUTION | End: 2023-08-15
Attending: INTERNAL MEDICINE | Admitting: INTERNAL MEDICINE
Payer: COMMERCIAL

## 2023-08-15 VITALS
SYSTOLIC BLOOD PRESSURE: 173 MMHG | HEART RATE: 69 BPM | OXYGEN SATURATION: 96 % | DIASTOLIC BLOOD PRESSURE: 82 MMHG | RESPIRATION RATE: 13 BRPM | TEMPERATURE: 97.9 F

## 2023-08-15 DIAGNOSIS — I48.19 PERSISTENT ATRIAL FIBRILLATION (H): ICD-10-CM

## 2023-08-15 DIAGNOSIS — Z95.818 PRESENCE OF WATCHMAN LEFT ATRIAL APPENDAGE CLOSURE DEVICE: ICD-10-CM

## 2023-08-15 LAB — LVEF ECHO: NORMAL

## 2023-08-15 PROCEDURE — 93312 ECHO TRANSESOPHAGEAL: CPT | Mod: 26 | Performed by: INTERNAL MEDICINE

## 2023-08-15 PROCEDURE — 99152 MOD SED SAME PHYS/QHP 5/>YRS: CPT | Performed by: INTERNAL MEDICINE

## 2023-08-15 PROCEDURE — 258N000003 HC RX IP 258 OP 636: Performed by: INTERNAL MEDICINE

## 2023-08-15 PROCEDURE — 250N000009 HC RX 250: Performed by: INTERNAL MEDICINE

## 2023-08-15 PROCEDURE — 93320 DOPPLER ECHO COMPLETE: CPT | Mod: 26 | Performed by: INTERNAL MEDICINE

## 2023-08-15 PROCEDURE — 93312 ECHO TRANSESOPHAGEAL: CPT

## 2023-08-15 PROCEDURE — 93325 DOPPLER ECHO COLOR FLOW MAPG: CPT | Mod: 26 | Performed by: INTERNAL MEDICINE

## 2023-08-15 PROCEDURE — 250N000011 HC RX IP 250 OP 636: Performed by: INTERNAL MEDICINE

## 2023-08-15 PROCEDURE — 93325 DOPPLER ECHO COLOR FLOW MAPG: CPT

## 2023-08-15 RX ORDER — SODIUM CHLORIDE 9 MG/ML
INJECTION, SOLUTION INTRAVENOUS CONTINUOUS
Status: DISCONTINUED | OUTPATIENT
Start: 2023-08-15 | End: 2023-08-15 | Stop reason: HOSPADM

## 2023-08-15 RX ORDER — LIDOCAINE HYDROCHLORIDE 20 MG/ML
SOLUTION OROPHARYNGEAL
Status: COMPLETED | OUTPATIENT
Start: 2023-08-15 | End: 2023-08-15

## 2023-08-15 RX ORDER — FENTANYL CITRATE 50 UG/ML
INJECTION, SOLUTION INTRAMUSCULAR; INTRAVENOUS
Status: COMPLETED | OUTPATIENT
Start: 2023-08-15 | End: 2023-08-15

## 2023-08-15 RX ORDER — LIDOCAINE 40 MG/G
CREAM TOPICAL
Status: DISCONTINUED | OUTPATIENT
Start: 2023-08-15 | End: 2023-08-15 | Stop reason: HOSPADM

## 2023-08-15 RX ADMIN — SODIUM CHLORIDE: 9 INJECTION, SOLUTION INTRAVENOUS at 12:19

## 2023-08-15 RX ADMIN — LIDOCAINE HYDROCHLORIDE 15 ML: 20 SOLUTION ORAL; TOPICAL at 12:41

## 2023-08-15 RX ADMIN — TOPICAL ANESTHETIC 0.5 ML: 200 SPRAY DENTAL; PERIODONTAL at 12:41

## 2023-08-15 RX ADMIN — FENTANYL CITRATE 50 MCG: 50 INJECTION, SOLUTION INTRAMUSCULAR; INTRAVENOUS at 12:44

## 2023-08-15 RX ADMIN — MIDAZOLAM 1 MG: 1 INJECTION INTRAMUSCULAR; INTRAVENOUS at 12:43

## 2023-08-15 RX ADMIN — MIDAZOLAM 0.5 MG: 1 INJECTION INTRAMUSCULAR; INTRAVENOUS at 12:45

## 2023-08-15 ASSESSMENT — ACTIVITIES OF DAILY LIVING (ADL): ADLS_ACUITY_SCORE: 35

## 2023-08-15 NOTE — SEDATION DOCUMENTATION
Pt wheeled out to pt car;  driving pt back to TCU. Discharge instructions reviewed with pt and ; verbalized understanding. Copy of discharge paperwork sent with  to give to TCU staff. Pt able to transfer out of bed, into wheelchair, into car with assist x1. Pt alert and answering questions appropriately; denies pain.

## 2023-08-15 NOTE — DISCHARGE INSTRUCTIONS
1. You are required to have someone accompany you home.    2. Rest today. Do not drive or operate machinery today. Over-activity may produce nausea and dizziness.    3. You should follow your normal diet. Drink plenty of fluids. Do not drink any alcoholic beverages for 24 hours. *(Alcohol may interact with the medications you received today).    4. NO HOT FOODS or LIQUIDS FOR 6 HOURS after the procedure.    You may have cold foods at 2pm today    You my have warm/hot foods at 7pm today    5. You may have a sore throat or cough. This is normal. These symptoms should resolve in 24 hours.     6. If you have further questions call your doctor:      Denise [phone]

## 2023-08-21 ENCOUNTER — TELEPHONE (OUTPATIENT)
Dept: CARDIOLOGY | Facility: CLINIC | Age: 83
End: 2023-08-21
Payer: COMMERCIAL

## 2023-08-21 NOTE — TELEPHONE ENCOUNTER
Incoming call from RN at [facility]. They are requesting fax of new orders. No new orders at this time. Clarified that patient will stop Plavix as of 10/13/2023. Orders can be faxed to [phone] attn LENA at [facility]. Will send when time comes. GEM

## 2023-08-21 NOTE — TELEPHONE ENCOUNTER
Phone call to patient's  to discuss results of 3 Month Post-LAAC GLENN. Also called and left a  for Deanna at [facility] where patient is currently residing. Informed them of no concern for thrombus on face of LAAC or leak around the edges of LAAC device. Informed of normal EF-Improved from Intra-Op GLENN. We discussed that per post-LAAC protocol, patient is to remain on once daily 81 mg ASA and once daily 75 mg plavix until approximately 6 months post-LAAC, which will be October 13th 2023. Patient will receive a phone call around that time to discuss overall health status and medications related to LAAC. Patient verbally confirmed taking ASA and plavix as prescribed. Encouraged patient to return call if they were to have any further questions or concerns. No further questions to concerns at this time. -SC

## 2023-09-05 ENCOUNTER — TELEPHONE (OUTPATIENT)
Dept: CARDIOLOGY | Facility: CLINIC | Age: 83
End: 2023-09-05
Payer: COMMERCIAL

## 2023-09-05 NOTE — TELEPHONE ENCOUNTER
[facility]    Phone Message    May a detailed message be left on voicemail: yes     Reason for Call: Other: Per call from Vernon requesting a call back to confirm date for patient to stop her Plavix and he thinks it's the 8th of September but not sure. Please reach out to Vernon.      Action Taken: Other: Cardio    Travel Screening: Not Applicable

## 2023-09-07 NOTE — TELEPHONE ENCOUNTER
Call placed to patient to let him know that patient should continue taking plavix until October 13th. We will call to the care center at that time to let them know to discontinue taking medication. He had no further questions. -SC

## 2023-10-11 ENCOUNTER — TELEPHONE (OUTPATIENT)
Dept: CARDIOLOGY | Facility: CLINIC | Age: 83
End: 2023-10-11
Payer: COMMERCIAL

## 2023-10-11 DIAGNOSIS — Z95.818 PRESENCE OF WATCHMAN LEFT ATRIAL APPENDAGE CLOSURE DEVICE: Primary | ICD-10-CM

## 2023-10-11 NOTE — TELEPHONE ENCOUNTER
Call placed to  Vernon. Patient should discontinue taking her Plavix on 10/13/23 as she is is 6 month Post LAAC and will continue on aspirin 81mg indefinately. Will fax order to [place] [facility]. M-Emilia completed below. She will follow up in clinic in 6 months for 1 year Post LAAC follow up in clinic. -SC    MODIFIED EMILIA SCALE   Timepoint: 6mo Post-LAAC    Previous score: 3    Score Description   0 No symptoms at all   1 No significant disability despite symptoms; able to carry out all usual duties and activities   2 Slight disability; unable to carry out all previous activities, but able to look after own affairs without assistance   3 Moderate disability; requiring some help, but able to walk without assistance   4 Moderately severe disability; unable to walk without assistance and unable to attend to own bodily needs without assistance   5 Severe disability; bedridden, incontinent and requiring constant nursing care and attention   6 Dead    Total score (0 - 6):  3    Change in score if s/p LAAC? No  If yes, notify implanting cardiologist.    Rula Pimentel RN BSN  Structural Heart Coordinator   [facility]  [phone]

## 2023-11-17 ENCOUNTER — LAB REQUISITION (OUTPATIENT)
Dept: LAB | Facility: CLINIC | Age: 83
End: 2023-11-17
Payer: COMMERCIAL

## 2023-11-17 DIAGNOSIS — F03.90 UNSPECIFIED DEMENTIA, UNSPECIFIED SEVERITY, WITHOUT BEHAVIORAL DISTURBANCE, PSYCHOTIC DISTURBANCE, MOOD DISTURBANCE, AND ANXIETY (H): ICD-10-CM

## 2023-11-21 LAB — VIT B12 SERPL-MCNC: 1415 PG/ML (ref 232–1245)

## 2023-11-21 PROCEDURE — P9603 ONE-WAY ALLOW PRORATED MILES: HCPCS | Mod: ORL | Performed by: NURSE PRACTITIONER

## 2023-11-21 PROCEDURE — 36415 COLL VENOUS BLD VENIPUNCTURE: CPT | Mod: ORL | Performed by: NURSE PRACTITIONER

## 2023-11-21 PROCEDURE — 82607 VITAMIN B-12: CPT | Mod: ORL | Performed by: NURSE PRACTITIONER

## 2024-03-25 ENCOUNTER — LAB REQUISITION (OUTPATIENT)
Dept: LAB | Facility: CLINIC | Age: 84
End: 2024-03-25
Payer: COMMERCIAL

## 2024-03-25 DIAGNOSIS — B97.4 RESPIRATORY SYNCYTIAL VIRUS AS THE CAUSE OF DISEASES CLASSIFIED ELSEWHERE: ICD-10-CM

## 2024-03-25 DIAGNOSIS — U07.1 COVID-19: ICD-10-CM

## 2024-03-25 DIAGNOSIS — J10.1 INFLUENZA DUE TO OTHER IDENTIFIED INFLUENZA VIRUS WITH OTHER RESPIRATORY MANIFESTATIONS: ICD-10-CM

## 2024-03-25 LAB
FLUAV RNA SPEC QL NAA+PROBE: NEGATIVE
FLUBV RNA RESP QL NAA+PROBE: NEGATIVE
RSV RNA SPEC NAA+PROBE: NEGATIVE
SARS-COV-2 RNA RESP QL NAA+PROBE: NEGATIVE

## 2024-03-25 PROCEDURE — 87637 SARSCOV2&INF A&B&RSV AMP PRB: CPT | Mod: ORL | Performed by: NURSE PRACTITIONER

## 2024-04-04 ENCOUNTER — TELEPHONE (OUTPATIENT)
Dept: CARDIOLOGY | Facility: CLINIC | Age: 84
End: 2024-04-04
Payer: COMMERCIAL

## 2024-04-04 NOTE — TELEPHONE ENCOUNTER
Call placed to  to complete m-[place] and 1 year Post LAAC check in. LM with direct number for call back. -SC

## 2024-04-05 NOTE — TELEPHONE ENCOUNTER
Incoming call from Vernon higuera . Reports there have been no changes to her status. M-Emilia completed below. Patient remains on once daily aspirin per protocol. He had no further questions and will follow up with Dr. Hobson next week. -SC    MODIFIED EMILIA SCALE   Timepoint: 1yr Post-LAAC    Previous score: 3    Score Description   0 No symptoms at all   1 No significant disability despite symptoms; able to carry out all usual duties and activities   2 Slight disability; unable to carry out all previous activities, but able to look after own affairs without assistance   3 Moderate disability; requiring some help, but able to walk without assistance   4 Moderately severe disability; unable to walk without assistance and unable to attend to own bodily needs without assistance   5 Severe disability; bedridden, incontinent and requiring constant nursing care and attention   6 Dead    Total score (0 - 6):  3    Change in score if s/p LAAC? No  If yes, notify implanting cardiologist.    Rula Pimentel RN BSN  Structural Heart Coordinator   [facility]  [phone]

## 2024-04-10 ENCOUNTER — OFFICE VISIT (OUTPATIENT)
Dept: CARDIOLOGY | Facility: CLINIC | Age: 84
End: 2024-04-10
Attending: INTERNAL MEDICINE
Payer: COMMERCIAL

## 2024-04-10 VITALS
SYSTOLIC BLOOD PRESSURE: 114 MMHG | HEART RATE: 44 BPM | DIASTOLIC BLOOD PRESSURE: 78 MMHG | BODY MASS INDEX: 35.3 KG/M2 | WEIGHT: 193 LBS | RESPIRATION RATE: 16 BRPM

## 2024-04-10 DIAGNOSIS — Z95.818 PRESENCE OF WATCHMAN LEFT ATRIAL APPENDAGE CLOSURE DEVICE: ICD-10-CM

## 2024-04-10 PROCEDURE — 99214 OFFICE O/P EST MOD 30 MIN: CPT | Performed by: INTERNAL MEDICINE

## 2024-04-10 NOTE — PROGRESS NOTES
HEART CARE ENCOUNTER CONSULTATON NOTE      [facility]  [phone]      Assessment/Recommendations   Assessment/Plan:    Jodee Johnson is a very pleasant 83 year old female with persistent atrial fibrillation s/p LAAO device, hypertension, dyslipidemia, hypothyroidism, left breast cancer (status post lumpectomy 2022 and XRT, now on exemestane), depression and cognitive impairment.  Hemorrhagic stroke 2015 with suspected multiple TIAs who presents today to the EP clinic.    Permanent atrial fibrillation and flutter  - Asymptomatic from AF stand point  - rates controlled currently    2. Anticoagulation  - s/p Watchman device  - Follow up GLENN was WNL  - continue Aspirin 81 mg daily    3. Hypertension  - Well controlled    Time spent: 30 minutes spent on the date of the encounter doing chart review, history and exam, documentation and further activities as noted above.       History of Present Illness/Subjective    HPI: Jodee Johnson is a very pleasant 83 year old female with persistent atrial fibrillation s/p LAAO device, hypertension, dyslipidemia, hypothyroidism, left breast cancer (status post lumpectomy 2022 and XRT, now on exemestane), depression and cognitive impairment.  Hemorrhagic stroke 2015 with suspected multiple TIAs who presents today to the EP clinic.    Her history of atrial fibrillation dates back to 2021 at which time it was detected incidentally.  She has remained largely asymptomatic  from AF stand point.     She underwent left atrial appendage closure with 24 mm Watchman FLX device 4/13/2023 without initial complications. Eliquis was discontinued and she was started on low-dose aspirin and Plavix.     April 2024  She reports no chest pain, shortness of breath, orthopnea, PND, presyncope or syncope. She has been walking using the walker.       GLENN August 2023  Interpretation Summary     The left ventricle is normal in size.  Left ventricular function is normal.The ejection  fraction is 60-65%.  The left atrium is mildly dilated.  Watchman device noted in left atrial appendage. The device is well seated with  no leakage by color flow Doppler imaging.  Thrombus absent on left atrial appendage occlusion device.       Procedural GLENN: 4/13/2023     Pre-Device:  1. Normal left ventricular size and systolic function. LVEF:55-60%  2. No left atrial thrombus or spontaneous contrast. Mild left atrial  enlargement.  3. No ASD or PFO by color flow.  4. Trivial pericardial effusion.     Post Device:  1. Well seated 24 mm Watchman FLX device in left atrial appendage by 2D and 3D  imaging. No color Doppler evidence of flow around device at ostium insertion  before or after device release. No change in mitral valve function. No  thrombus noted on device, LA or ADRIANO.     ECHOCARDIOGRAM: 1/17/2022  The left atrium is mildly dilated.  Biplane LVEF is 60%.  Diastolic Doppler findings (E/E' ratio and/or other parameters) suggest left  ventricular filling pressures are indeterminate.  There is mild (1+) mitral regurgitation.  There is mild (1+) aortic regurgitation.  There is trace to mild tricuspid regurgitation.  Doppler findings do not suggest pulmonary hypertension.  The rhythm was atrial fibrillation.     MCOT AND/OR EVENT MONITOR:      MCOT 1/5/2023 to 1/18/2023 (monitored duration 10d 11h 3m).  Continous atrial fibrillation with paroxysmal atrial flutter, average 59 bpm.    60 pauses >2 seconds, longest 4.1 seconds at 2146 1/9/2023, pauses noted during daytime and nighttime hours.    Symptom triggers including symptoms not listed correlated to atrial fibrillation at 70 bpm     ADDITIONAL STUDIES:     NMST: 1/5/2023    The nuclear stress test is negative for inducible myocardial ischemia or infarction.    Left ventricular function is normal. The left ventricular ejection fraction at rest is 65%.    A prior study was conducted on 5/9/2014.  Prior images were unavailable for comparison review      Labs  below reviewed personally     Physical Examination  Review of Systems   Vitals: /78 (BP Location: Left arm, Patient Position: Sitting, Cuff Size: Adult Regular)   Pulse (!) 44   Resp 16   Wt 87.5 kg (193 lb)   BMI 35.30 kg/m    BMI= Body mass index is 35.3 kg/m .  Wt Readings from Last 3 Encounters:   04/10/24 87.5 kg (193 lb)   08/08/23 76.7 kg (169 lb)   05/25/23 79.8 kg (176 lb)       General Appearance:   no distress, normal body habitus   ENT/Mouth: membranes moist, no oral lesions or bleeding gums.      EYES:  no scleral icterus, normal conjunctivae   Neck: no carotid bruits or thyromegaly   Chest/Lungs:   lungs are clear to auscultation, no rales or wheezing, no sternal scar, equal chest wall expansion    Cardiovascular:   Irregular. Normal first and second heart sounds with no murmurs, rubs, or gallops; the carotid, radial and posterior tibial pulses are intact, no edema bilaterally    Abdomen:  no organomegaly, masses, bruits, or tenderness; bowel sounds are present   Extremities: no cyanosis or clubbing   Skin: no xanthelasma, warm.    Neurologic: normal  bilateral, no tremors     Psychiatric: alert and oriented x3, calm        Please refer above for cardiac ROS details.        Medical History  Surgical History Family History Social History   Past Medical History:   Diagnosis Date    Atrial fibrillation (H)     Cancer (H)     breast    Depression     Hemorrhagic cerebrovascular accident (CVA) (H)     due to HTN?    Hyperlipidemia     Hypertension     Hypotension, unspecified hypotension type 8/17/2021    Hypothyroidism     Restless leg syndrome     Weight loss 8/17/2021     Past Surgical History:   Procedure Laterality Date    CV LEFT ATRIAL APPENDAGE CLOSURE N/A 4/13/2023    Procedure: Left Atrial Appendage Closure;  Surgeon: Shavon Hobson MD;  Location: [facility] CV    IR LYMPH NODE BIOPSY  11/3/2021    LUMPECTOMY BREAST  01/20/2022     No family history on file.     Social  History     Socioeconomic History    Marital status: Single     Spouse name: Not on file    Number of children: Not on file    Years of education: Not on file    Highest education level: Not on file   Occupational History    Not on file   Tobacco Use    Smoking status: Former     Types: Cigarettes     Passive exposure: Never    Smokeless tobacco: Never   Substance and Sexual Activity    Alcohol use: Not on file    Drug use: Not on file    Sexual activity: Not on file   Other Topics Concern    Parent/sibling w/ CABG, MI or angioplasty before 65F 55M? Not Asked   Social History Narrative    Not on file     Social Determinants of Health     Financial Resource Strain: Not on file   Food Insecurity: Not on file   Transportation Needs: Not on file   Physical Activity: Not on file   Stress: Not on file   Social Connections: Unknown (11/16/2022)    Received from [employer] [facility] [employer], [employer] [facility]    Social Connections     Frequency of Communication with Friends and Family: Not on file   Interpersonal Safety: Not on file   Housing Stability: Not on file           Medications  Allergies   Current Outpatient Medications   Medication Sig Dispense Refill    acetaminophen (TYLENOL) 500 MG tablet Take 500-1,000 mg by mouth every 6 hours as needed for pain      ARIPiprazole (ABILIFY) 5 MG tablet Take 5 mg by mouth every morning      aspirin (ASA) 81 MG EC tablet Take 1 tablet (81 mg) by mouth daily Don't take this in addition to your excedrin migraine.      aspirin-acetaminophen-caffeine (EXCEDRIN MIGRAINE) 250-250-65 MG tablet Take 1 tablet by mouth every 4 hours as needed for headaches      cholecalciferol, vitamin D3, 50 mcg (2,000 unit) capsule [CHOLECALCIFEROL, VITAMIN D3, 50 MCG (2,000 UNIT) CAPSULE] Take 1 capsule by mouth daily.      cloNIDine HCL (CATAPRES) 0.1 MG tablet Take 1 tablet by mouth 2 times daily Hold if BP <100/60      divalproex sodium delayed-release  (DEPAKOTE) 250 MG DR tablet Take 250 mg by mouth 2 times daily      DULoxetine HCl 40 MG CPEP Take 40 mg by mouth daily      estradiol (ESTRACE) 0.1 MG/GM vaginal cream Place vaginally three times a week      exemestane (AROMASIN) 25 MG tablet Take 25 mg by mouth daily      levothyroxine (SYNTHROID/LEVOTHROID) 137 MCG tablet Take 68.5 mcg by mouth daily      losartan (COZAAR) 25 MG tablet Take 1 tablet by mouth daily at 2 pm      Melatonin 10 MG CAPS Take 10 mg by mouth nightly as needed for sleep      memantine (NAMENDA) 5 MG tablet Take 2.5 mg by mouth 2 times daily      potassium chloride ER (KLOR-CON M) 20 MEQ CR tablet Take 20 mEq by mouth daily      rosuvastatin (CRESTOR) 5 MG tablet Take 5 mg by mouth At Bedtime       traZODone (DESYREL) 50 MG tablet Take 50 mg by mouth At Bedtime         Allergies   Allergen Reactions    Atenolol Unknown     Other reaction(s): Bradycardia, Other reaction(s): Bradycardia    Hydrochlorothiazide Unknown     Other reaction(s): Hypercalcemia, Other reaction(s): Hypercalcemia    Gabapentin Other (See Comments)     Other reaction(s): Confusion, Other reaction(s): Insomnia, Unknown, insomnia, insomnia    Lisinopril Nausea     Other reaction(s): GI Upset, Other reaction(s): Abdominal Pain    Nortriptyline Unknown    Oxycodone Itching    Paroxetine Unknown     Other reaction(s): Drowsiness, Sedation, Other reaction(s): Sedation    Tetracycline Photosensitivity and Rash    Amlodipine Unknown     Other reaction(s): GI Upset, Vomiting, Other reaction(s): Abdominal Pain    Codeine Itching     Tolerates tylenol 3 at home per pt    Oxycodone-Acetaminophen Itching    Sertraline Unknown     Other reaction(s): Sedation, Other reaction(s): Sedation    Diphenhydramine Anxiety     Other reaction(s): Agitation, Other reaction(s): Unknown          Lab Results    Chemistry/lipid CBC Cardiac Enzymes/BNP/TSH/INR   No results for input(s): CHOL, HDL, LDL, TRIG, CHOLHDLRATIO in the last 40513 hours.  No  results for input(s): LDL in the last 07631 hours.  Recent Labs   Lab Test 04/13/23  1443 04/10/23  0935   NA  --  142   POTASSIUM  --  3.9   CHLORIDE  --  104   CO2  --  29   GLC  --  129*   BUN  --  17.5   CR 0.95 0.89   GFRESTIMATED 60* 64   DANTE  --  10.1     Recent Labs   Lab Test 04/13/23  1443 04/10/23  0935 03/18/23  0759   CR 0.95 0.89 1.0     No results for input(s): A1C in the last 87925 hours.       Recent Labs   Lab Test 04/13/23  1443 04/10/23  0935   WBC  --  7.5   HGB 14.8 15.9*   HCT  --  48.9*   MCV  --  96   PLT  --  243     Recent Labs   Lab Test 04/13/23 1443 04/10/23  0935   HGB 14.8 15.9*    No results for input(s): TROPONINI in the last 39800 hours.  No results for input(s): BNP, NTBNPI, NTBNP in the last 89356 hours.  No results for input(s): TSH in the last 28300 hours.  No results for input(s): INR in the last 05133 hours.     Shavon Hobson MD                                  ]

## 2024-04-10 NOTE — LETTER
4/10/2024    YO PALUMBO  [facility] [address] [facility] [address]    RE: Jodee Johnson       Dear Colleague,     I had the pleasure of seeing Jodee Johnson in the [facility].    HEART CARE ENCOUNTER CONSULTATON NOTE      [facility]  [phone]      Assessment/Recommendations   Assessment/Plan:    Jodee Johnson is a very pleasant 83 year old female with persistent atrial fibrillation s/p LAAO device, hypertension, dyslipidemia, hypothyroidism, left breast cancer (status post lumpectomy 2022 and XRT, now on exemestane), depression and cognitive impairment.  Hemorrhagic stroke 2015 with suspected multiple TIAs who presents today to the EP clinic.    Permanent atrial fibrillation and flutter  - Asymptomatic from AF stand point  - rates controlled currently    2. Anticoagulation  - s/p Watchman device  - Follow up GLENN was WNL  - continue Aspirin 81 mg daily    3. Hypertension  - Well controlled    Time spent: 30 minutes spent on the date of the encounter doing chart review, history and exam, documentation and further activities as noted above.       History of Present Illness/Subjective    HPI: Jodee Johnson is a very pleasant 83 year old female with persistent atrial fibrillation s/p LAAO device, hypertension, dyslipidemia, hypothyroidism, left breast cancer (status post lumpectomy 2022 and XRT, now on exemestane), depression and cognitive impairment.  Hemorrhagic stroke 2015 with suspected multiple TIAs who presents today to the EP clinic.    Her history of atrial fibrillation dates back to 2021 at which time it was detected incidentally.  She has remained largely asymptomatic  from AF stand point.     She underwent left atrial appendage closure with 24 mm Watchman FLX device 4/13/2023 without initial complications. Eliquis was discontinued and she was started on low-dose aspirin and Plavix.     April 2024  She reports no chest pain, shortness  of breath, orthopnea, PND, presyncope or syncope. She has been walking using the walker.       GLENN August 2023  Interpretation Summary     The left ventricle is normal in size.  Left ventricular function is normal.The ejection fraction is 60-65%.  The left atrium is mildly dilated.  Watchman device noted in left atrial appendage. The device is well seated with  no leakage by color flow Doppler imaging.  Thrombus absent on left atrial appendage occlusion device.       Procedural GLENN: 4/13/2023     Pre-Device:  1. Normal left ventricular size and systolic function. LVEF:55-60%  2. No left atrial thrombus or spontaneous contrast. Mild left atrial  enlargement.  3. No ASD or PFO by color flow.  4. Trivial pericardial effusion.     Post Device:  1. Well seated 24 mm Watchman FLX device in left atrial appendage by 2D and 3D  imaging. No color Doppler evidence of flow around device at ostium insertion  before or after device release. No change in mitral valve function. No  thrombus noted on device, LA or ADRIANO.     ECHOCARDIOGRAM: 1/17/2022  The left atrium is mildly dilated.  Biplane LVEF is 60%.  Diastolic Doppler findings (E/E' ratio and/or other parameters) suggest left  ventricular filling pressures are indeterminate.  There is mild (1+) mitral regurgitation.  There is mild (1+) aortic regurgitation.  There is trace to mild tricuspid regurgitation.  Doppler findings do not suggest pulmonary hypertension.  The rhythm was atrial fibrillation.     MCOT AND/OR EVENT MONITOR:      MCOT 1/5/2023 to 1/18/2023 (monitored duration 10d 11h 3m).  Continous atrial fibrillation with paroxysmal atrial flutter, average 59 bpm.    60 pauses >2 seconds, longest 4.1 seconds at 2146 1/9/2023, pauses noted during daytime and nighttime hours.    Symptom triggers including symptoms not listed correlated to atrial fibrillation at 70 bpm     ADDITIONAL STUDIES:     NMST: 1/5/2023    The nuclear stress test is negative for inducible myocardial  ischemia or infarction.    Left ventricular function is normal. The left ventricular ejection fraction at rest is 65%.    A prior study was conducted on 5/9/2014.  Prior images were unavailable for comparison review      Labs below reviewed personally     Physical Examination  Review of Systems   Vitals: /78 (BP Location: Left arm, Patient Position: Sitting, Cuff Size: Adult Regular)   Pulse (!) 44   Resp 16   Wt 87.5 kg (193 lb)   BMI 35.30 kg/m    BMI= Body mass index is 35.3 kg/m .  Wt Readings from Last 3 Encounters:   04/10/24 87.5 kg (193 lb)   08/08/23 76.7 kg (169 lb)   05/25/23 79.8 kg (176 lb)       General Appearance:   no distress, normal body habitus   ENT/Mouth: membranes moist, no oral lesions or bleeding gums.      EYES:  no scleral icterus, normal conjunctivae   Neck: no carotid bruits or thyromegaly   Chest/Lungs:   lungs are clear to auscultation, no rales or wheezing, no sternal scar, equal chest wall expansion    Cardiovascular:   Irregular. Normal first and second heart sounds with no murmurs, rubs, or gallops; the carotid, radial and posterior tibial pulses are intact, no edema bilaterally    Abdomen:  no organomegaly, masses, bruits, or tenderness; bowel sounds are present   Extremities: no cyanosis or clubbing   Skin: no xanthelasma, warm.    Neurologic: normal  bilateral, no tremors     Psychiatric: alert and oriented x3, calm        Please refer above for cardiac ROS details.        Medical History  Surgical History Family History Social History   Past Medical History:   Diagnosis Date    Atrial fibrillation (H)     Cancer (H)     breast    Depression     Hemorrhagic cerebrovascular accident (CVA) (H)     due to HTN?    Hyperlipidemia     Hypertension     Hypotension, unspecified hypotension type 8/17/2021    Hypothyroidism     Restless leg syndrome     Weight loss 8/17/2021     Past Surgical History:   Procedure Laterality Date    CV LEFT ATRIAL APPENDAGE CLOSURE N/A  4/13/2023    Procedure: Left Atrial Appendage Closure;  Surgeon: Shavon Hobson MD;  [facility]: [facility] LAB CV    IR LYMPH NODE BIOPSY  11/3/2021    LUMPECTOMY BREAST  01/20/2022     No family history on file.     Social History     Socioeconomic History    Marital status: Single     Spouse name: Not on file    Number of children: Not on file    Years of education: Not on file    Highest education level: Not on file   Occupational History    Not on file   Tobacco Use    Smoking status: Former     Types: Cigarettes     Passive exposure: Never    Smokeless tobacco: Never   Substance and Sexual Activity    Alcohol use: Not on file    Drug use: Not on file    Sexual activity: Not on file   Other Topics Concern    Parent/sibling w/ CABG, MI or angioplasty before 65F 55M? Not Asked   Social History Narrative    Not on file     Social Determinants of Health     Financial Resource Strain: Not on file   Food Insecurity: Not on file   Transportation Needs: Not on file   Physical Activity: Not on file   Stress: Not on file   Social Connections: Unknown (11/16/2022)    [facility]    Social Connections     Frequency of Communication with Friends and Family: Not on file   Interpersonal Safety: Not on file   Housing Stability: Not on file           Medications  Allergies   Current Outpatient Medications   Medication Sig Dispense Refill    acetaminophen (TYLENOL) 500 MG tablet Take 500-1,000 mg by mouth every 6 hours as needed for pain      ARIPiprazole (ABILIFY) 5 MG tablet Take 5 mg by mouth every morning      aspirin (ASA) 81 MG EC tablet Take 1 tablet (81 mg) by mouth daily Don't take this in addition to your excedrin migraine.      aspirin-acetaminophen-caffeine (EXCEDRIN MIGRAINE) 250-250-65 MG tablet Take 1 tablet by mouth every 4 hours as needed for headaches      cholecalciferol, vitamin D3, 50 mcg (2,000 unit) capsule  [CHOLECALCIFEROL, VITAMIN D3, 50 MCG (2,000 UNIT) CAPSULE] Take 1 capsule by mouth daily.      cloNIDine HCL (CATAPRES) 0.1 MG tablet Take 1 tablet by mouth 2 times daily Hold if BP <100/60      divalproex sodium delayed-release (DEPAKOTE) 250 MG DR tablet Take 250 mg by mouth 2 times daily      DULoxetine HCl 40 MG CPEP Take 40 mg by mouth daily      estradiol (ESTRACE) 0.1 MG/GM vaginal cream Place vaginally three times a week      exemestane (AROMASIN) 25 MG tablet Take 25 mg by mouth daily      levothyroxine (SYNTHROID/LEVOTHROID) 137 MCG tablet Take 68.5 mcg by mouth daily      losartan (COZAAR) 25 MG tablet Take 1 tablet by mouth daily at 2 pm      Melatonin 10 MG CAPS Take 10 mg by mouth nightly as needed for sleep      memantine (NAMENDA) 5 MG tablet Take 2.5 mg by mouth 2 times daily      potassium chloride ER (KLOR-CON M) 20 MEQ CR tablet Take 20 mEq by mouth daily      rosuvastatin (CRESTOR) 5 MG tablet Take 5 mg by mouth At Bedtime       traZODone (DESYREL) 50 MG tablet Take 50 mg by mouth At Bedtime         Allergies   Allergen Reactions    Atenolol Unknown     Other reaction(s): Bradycardia, Other reaction(s): Bradycardia    Hydrochlorothiazide Unknown     Other reaction(s): Hypercalcemia, Other reaction(s): Hypercalcemia    Gabapentin Other (See Comments)     Other reaction(s): Confusion, Other reaction(s): Insomnia, Unknown, insomnia, insomnia    Lisinopril Nausea     Other reaction(s): GI Upset, Other reaction(s): Abdominal Pain    Nortriptyline Unknown    Oxycodone Itching    Paroxetine Unknown     Other reaction(s): Drowsiness, Sedation, Other reaction(s): Sedation    Tetracycline Photosensitivity and Rash    Amlodipine Unknown     Other reaction(s): GI Upset, Vomiting, Other reaction(s): Abdominal Pain    Codeine Itching     Tolerates tylenol 3 at home per pt    Oxycodone-Acetaminophen Itching    Sertraline Unknown     Other reaction(s): Sedation, Other reaction(s): Sedation    Diphenhydramine  Anxiety     Other reaction(s): Agitation, Other reaction(s): Unknown          Lab Results    Chemistry/lipid CBC Cardiac Enzymes/BNP/TSH/INR   No results for input(s): CHOL, HDL, LDL, TRIG, CHOLHDLRATIO in the last 55355 hours.  No results for input(s): LDL in the last 57275 hours.  Recent Labs   Lab Test 04/13/23  1443 04/10/23  0935   NA  --  142   POTASSIUM  --  3.9   CHLORIDE  --  104   CO2  --  29   GLC  --  129*   BUN  --  17.5   CR 0.95 0.89   GFRESTIMATED 60* 64   DANTE  --  10.1     Recent Labs   Lab Test 04/13/23  1443 04/10/23  0935 03/18/23  0759   CR 0.95 0.89 1.0     No results for input(s): A1C in the last 55965 hours.       Recent Labs   Lab Test 04/13/23  1443 04/10/23  0935   WBC  --  7.5   HGB 14.8 15.9*   HCT  --  48.9*   MCV  --  96   PLT  --  243     Recent Labs   Lab Test 04/13/23  1443 04/10/23  0935   HGB 14.8 15.9*    No results for input(s): TROPONINI in the last 29463 hours.  No results for input(s): BNP, NTBNPI, NTBNP in the last 14476 hours.  No results for input(s): TSH in the last 04055 hours.  No results for input(s): INR in the last 22519 hours.     Shavon Hobson MD        Thank you for allowing me to participate in the care of your patient.      Sincerely,     Shavon Hobson MD     [facility]  cc:   Shavon Hobson MD  [address] [place] [id number]

## 2024-04-10 NOTE — PATIENT INSTRUCTIONS
[facility]  Cardiac Electrophysiology  [address]  [place] [address]   Office: [phone]  Fax: [phone]       Thank you for seeing us in clinic today - it is a pleasure to be a part of your care team.  Below is a summary of our plan from today's visit.      Continue all current meds  Follow up on an as needed basis with EP    Please do not hesitate to be in touch with our office at [phone] with any questions that may arise.      Thank you for trusting us with your care,    Shavon Hobson MD  Clinical Cardiac Electrophysiology  [facility]  [address]  [place] [address]   Office: [phone]  Fax: [phone]

## 2024-05-20 ENCOUNTER — LAB REQUISITION (OUTPATIENT)
Dept: LAB | Facility: CLINIC | Age: 84
End: 2024-05-20
Payer: COMMERCIAL

## 2024-05-20 DIAGNOSIS — I10 ESSENTIAL (PRIMARY) HYPERTENSION: ICD-10-CM

## 2024-05-21 LAB
ANION GAP SERPL CALCULATED.3IONS-SCNC: 10 MMOL/L (ref 7–15)
BUN SERPL-MCNC: 17.5 MG/DL (ref 8–23)
CALCIUM SERPL-MCNC: 10.1 MG/DL (ref 8.8–10.2)
CHLORIDE SERPL-SCNC: 107 MMOL/L (ref 98–107)
CREAT SERPL-MCNC: 0.8 MG/DL (ref 0.51–0.95)
DEPRECATED HCO3 PLAS-SCNC: 26 MMOL/L (ref 22–29)
EGFRCR SERPLBLD CKD-EPI 2021: 73 ML/MIN/1.73M2
GLUCOSE SERPL-MCNC: 88 MG/DL (ref 70–99)
POTASSIUM SERPL-SCNC: 4.1 MMOL/L (ref 3.4–5.3)
SODIUM SERPL-SCNC: 143 MMOL/L (ref 135–145)

## 2024-05-21 PROCEDURE — 80048 BASIC METABOLIC PNL TOTAL CA: CPT | Mod: ORL | Performed by: NURSE PRACTITIONER

## 2024-05-21 PROCEDURE — P9604 ONE-WAY ALLOW PRORATED TRIP: HCPCS | Mod: ORL | Performed by: NURSE PRACTITIONER

## 2024-05-21 PROCEDURE — 36415 COLL VENOUS BLD VENIPUNCTURE: CPT | Mod: ORL | Performed by: NURSE PRACTITIONER

## 2024-07-05 ENCOUNTER — LAB REQUISITION (OUTPATIENT)
Dept: LAB | Facility: CLINIC | Age: 84
End: 2024-07-05
Payer: COMMERCIAL

## 2024-07-05 DIAGNOSIS — R53.83 OTHER FATIGUE: ICD-10-CM

## 2024-07-05 DIAGNOSIS — R06.2 WHEEZING: ICD-10-CM

## 2024-07-05 DIAGNOSIS — E03.9 HYPOTHYROIDISM, UNSPECIFIED: ICD-10-CM

## 2024-07-09 LAB
ANION GAP SERPL CALCULATED.3IONS-SCNC: 11 MMOL/L (ref 7–15)
BUN SERPL-MCNC: 19.5 MG/DL (ref 8–23)
CALCIUM SERPL-MCNC: 10.3 MG/DL (ref 8.8–10.2)
CHLORIDE SERPL-SCNC: 108 MMOL/L (ref 98–107)
CREAT SERPL-MCNC: 0.87 MG/DL (ref 0.51–0.95)
DEPRECATED HCO3 PLAS-SCNC: 23 MMOL/L (ref 22–29)
EGFRCR SERPLBLD CKD-EPI 2021: 65 ML/MIN/1.73M2
ERYTHROCYTE [DISTWIDTH] IN BLOOD BY AUTOMATED COUNT: 14.6 % (ref 10–15)
GLUCOSE SERPL-MCNC: 92 MG/DL (ref 70–99)
HBA1C MFR BLD: 6 %
HCT VFR BLD AUTO: 44.7 % (ref 35–47)
HGB BLD-MCNC: 14.2 G/DL (ref 11.7–15.7)
MCH RBC QN AUTO: 28.2 PG (ref 26.5–33)
MCHC RBC AUTO-ENTMCNC: 31.8 G/DL (ref 31.5–36.5)
MCV RBC AUTO: 89 FL (ref 78–100)
PLATELET # BLD AUTO: 247 10E3/UL (ref 150–450)
POTASSIUM SERPL-SCNC: 4.7 MMOL/L (ref 3.4–5.3)
RBC # BLD AUTO: 5.03 10E6/UL (ref 3.8–5.2)
SODIUM SERPL-SCNC: 142 MMOL/L (ref 135–145)
TSH SERPL DL<=0.005 MIU/L-ACNC: 2.32 UIU/ML (ref 0.3–4.2)
WBC # BLD AUTO: 6.7 10E3/UL (ref 4–11)

## 2024-07-09 PROCEDURE — 84443 ASSAY THYROID STIM HORMONE: CPT | Mod: ORL | Performed by: NURSE PRACTITIONER

## 2024-07-09 PROCEDURE — P9604 ONE-WAY ALLOW PRORATED TRIP: HCPCS | Mod: ORL | Performed by: NURSE PRACTITIONER

## 2024-07-09 PROCEDURE — 36415 COLL VENOUS BLD VENIPUNCTURE: CPT | Mod: ORL | Performed by: NURSE PRACTITIONER

## 2024-07-09 PROCEDURE — 80048 BASIC METABOLIC PNL TOTAL CA: CPT | Mod: ORL | Performed by: NURSE PRACTITIONER

## 2024-07-09 PROCEDURE — 83036 HEMOGLOBIN GLYCOSYLATED A1C: CPT | Mod: ORL | Performed by: NURSE PRACTITIONER

## 2024-07-09 PROCEDURE — 85027 COMPLETE CBC AUTOMATED: CPT | Mod: ORL | Performed by: NURSE PRACTITIONER

## 2024-11-05 ENCOUNTER — LAB REQUISITION (OUTPATIENT)
Dept: LAB | Facility: CLINIC | Age: 84
End: 2024-11-05
Payer: COMMERCIAL

## 2024-11-05 DIAGNOSIS — E11.9 TYPE 2 DIABETES MELLITUS WITHOUT COMPLICATIONS (H): ICD-10-CM

## 2024-11-06 ENCOUNTER — LAB REQUISITION (OUTPATIENT)
Dept: LAB | Facility: CLINIC | Age: 84
End: 2024-11-06
Payer: COMMERCIAL

## 2024-11-06 DIAGNOSIS — R73.03 PREDIABETES: ICD-10-CM

## 2024-11-06 DIAGNOSIS — E11.9 TYPE 2 DIABETES MELLITUS WITHOUT COMPLICATIONS (H): ICD-10-CM

## 2024-11-06 PROCEDURE — 82570 ASSAY OF URINE CREATININE: CPT | Mod: ORL

## 2024-11-06 PROCEDURE — 82043 UR ALBUMIN QUANTITATIVE: CPT | Mod: ORL

## 2024-11-07 LAB
CREAT UR-MCNC: 107 MG/DL
MICROALBUMIN UR-MCNC: 21.7 MG/L
MICROALBUMIN/CREAT UR: 20.28 MG/G CR (ref 0–25)

## 2024-11-11 LAB
CHOLEST SERPL-MCNC: 106 MG/DL
EST. AVERAGE GLUCOSE BLD GHB EST-MCNC: 126 MG/DL
FASTING STATUS PATIENT QL REPORTED: YES
HBA1C MFR BLD: 6 %
HDLC SERPL-MCNC: 44 MG/DL
LDLC SERPL CALC-MCNC: 40 MG/DL
NONHDLC SERPL-MCNC: 62 MG/DL
TRIGL SERPL-MCNC: 111 MG/DL

## 2024-11-11 PROCEDURE — 80061 LIPID PANEL: CPT | Mod: ORL

## 2024-11-11 PROCEDURE — P9604 ONE-WAY ALLOW PRORATED TRIP: HCPCS | Mod: ORL

## 2024-12-19 ENCOUNTER — LAB REQUISITION (OUTPATIENT)
Dept: LAB | Facility: CLINIC | Age: 84
End: 2024-12-19
Payer: COMMERCIAL

## 2024-12-19 DIAGNOSIS — M81.0 AGE-RELATED OSTEOPOROSIS WITHOUT CURRENT PATHOLOGICAL FRACTURE: ICD-10-CM

## 2024-12-20 LAB
ANION GAP SERPL CALCULATED.3IONS-SCNC: 19 MMOL/L (ref 7–15)
BUN SERPL-MCNC: 17.4 MG/DL (ref 8–23)
CALCIUM SERPL-MCNC: 10.6 MG/DL (ref 8.8–10.4)
CHLORIDE SERPL-SCNC: 105 MMOL/L (ref 98–107)
CREAT SERPL-MCNC: 0.85 MG/DL (ref 0.51–0.95)
EGFRCR SERPLBLD CKD-EPI 2021: 67 ML/MIN/1.73M2
GLUCOSE SERPL-MCNC: 91 MG/DL (ref 70–99)
HCO3 SERPL-SCNC: 21 MMOL/L (ref 22–29)
POTASSIUM SERPL-SCNC: 5.3 MMOL/L (ref 3.4–5.3)
SODIUM SERPL-SCNC: 145 MMOL/L (ref 135–145)
VIT D+METAB SERPL-MCNC: 62 NG/ML (ref 20–50)

## 2024-12-20 PROCEDURE — 36415 COLL VENOUS BLD VENIPUNCTURE: CPT | Mod: ORL | Performed by: INTERNAL MEDICINE

## 2024-12-20 PROCEDURE — P9604 ONE-WAY ALLOW PRORATED TRIP: HCPCS | Mod: ORL | Performed by: INTERNAL MEDICINE

## 2024-12-20 PROCEDURE — 82306 VITAMIN D 25 HYDROXY: CPT | Mod: ORL | Performed by: INTERNAL MEDICINE

## 2024-12-20 PROCEDURE — 80048 BASIC METABOLIC PNL TOTAL CA: CPT | Mod: ORL | Performed by: INTERNAL MEDICINE

## 2025-05-14 ENCOUNTER — OFFICE VISIT (OUTPATIENT)
Dept: CARDIOLOGY | Facility: CLINIC | Age: 85
End: 2025-05-14
Payer: COMMERCIAL

## 2025-05-14 VITALS
WEIGHT: 194 LBS | HEART RATE: 76 BPM | RESPIRATION RATE: 20 BRPM | DIASTOLIC BLOOD PRESSURE: 100 MMHG | SYSTOLIC BLOOD PRESSURE: 154 MMHG | BODY MASS INDEX: 35.48 KG/M2 | OXYGEN SATURATION: 96 %

## 2025-05-14 DIAGNOSIS — I48.21 PERMANENT ATRIAL FIBRILLATION (H): ICD-10-CM

## 2025-05-14 DIAGNOSIS — I10 BENIGN ESSENTIAL HYPERTENSION: ICD-10-CM

## 2025-05-14 DIAGNOSIS — Z95.818 PRESENCE OF WATCHMAN LEFT ATRIAL APPENDAGE CLOSURE DEVICE: Primary | ICD-10-CM

## 2025-05-14 RX ORDER — GUAIFENESIN 200 MG/10ML
20 LIQUID ORAL
COMMUNITY

## 2025-05-14 RX ORDER — NYSTATIN 100000 [USP'U]/G
1 POWDER TOPICAL
COMMUNITY

## 2025-05-14 ASSESSMENT — PATIENT HEALTH QUESTIONNAIRE - PHQ9: SUM OF ALL RESPONSES TO PHQ QUESTIONS 1-9: 14

## 2025-05-14 NOTE — LETTER
5/14/2025    YO PALUMBO MD  [facility] [address] [place] [address]    RE: Jodee Johnson       Dear Colleague,     I had the [facility] of [facility] the [facility].  HEART CARE ENCOUNTER NOTE       [facility]  [phone]      Assessment/Recommendations   1.  Permanent atrial fibrillation/atrial flutter: Status post LAAO 4/13/2023 with a 24 mm Watchman FLX device.  Continue aspirin 81 mg daily indefinitely.    MODIFIED JENNIFER SCALE   Timepoint: 2yr Post-LAAC    Previous score: 3    Score Description   0 No symptoms at all   1 No significant disability despite symptoms; able to carry out all usual duties and activities   2 Slight disability; unable to carry out all previous activities, but able to look after own affairs without assistance   3 Moderate disability; requiring some help, but able to walk without assistance   4 Moderately severe disability; unable to walk without assistance and unable to attend to own bodily needs without assistance   5 Severe disability; bedridden, incontinent and requiring constant nursing care and attention   6 Dead    Total score (0 - 6):  3    Change in score if s/p LAAC? No     2.  Hypertension - elevated.  May need to consider increasing losartan if persistently elevated          History of Present Illness/Subjective    Jodee Johnson is a 84 year old female who comes in today for 2-year follow-up after watchman implant.  She is accompanied by her significant other for today's visit.    Jodee Johnson has a past history of permanent atrial fibrillation, atrial flutter, hypertension, hypothyroidism, history of cerebral hemorrhage, history of breast cancer    She is somewhat of a poor historian.  Her significant other Vernon is present today.  She is residing at a care facility.  She is currently taking aspirin 81 mg daily.  She denies new stroke or strokelike symptoms since watchman implant.  She  denies chest discomfort, shortness of breath at last, leg swelling, orthopnea, PND.  She has a little bit of dizziness when going from sitting to standing.  Weight has been stable    Medical, surgical, family, social history, and medications were reviewed and updated as necessary.    ECHO results (from 8/15/2023):  Interpretation Summary     The left ventricle is normal in size.  Left ventricular function is normal.The ejection fraction is 60-65%.  The left atrium is mildly dilated.  Watchman device noted in left atrial appendage. The device is well seated with  no leakage by color flow Doppler imaging.  Thrombus absent on left atrial appendage occlusion device.            Physical Examination Review of Systems   Vitals: BP (!) 154/100 (BP Location: Right arm, Patient Position: Sitting, Cuff Size: Adult Large)   Pulse 76   Resp 20   Wt 88 kg (194 lb)   SpO2 96%   BMI 35.48 kg/m    BMI= Body mass index is 35.48 kg/m .  Wt Readings from Last 3 Encounters:   05/14/25 88 kg (194 lb)   04/10/24 87.5 kg (193 lb)   08/08/23 76.7 kg (169 lb)       General Appearance:   Alert, cooperative and in no acute distress   ENT/Mouth: membranes moist, no oral lesions or bleeding gums.      EYES:  no scleral icterus, normal conjunctivae   Neck: Thyroid not visualized   Chest/Lungs:   lungs are clear to auscultation, no rales or wheezing   Cardiovascular:   Irregular. Normal first and second heart sounds with no murmurs, rubs or gallops; no edema bilaterally    Abdomen:  Soft and nontender   Extremities: no cyanosis or clubbing   Skin:    Neurologic:    Psychiatric: Normal mood and affect       Please refer above for cardiac ROS details.      Medical History  Surgical History Family History Social History   Past Medical History:   Diagnosis Date     Atrial fibrillation (H)      Cancer (H)     breast     Depression      Hemorrhagic cerebrovascular accident (CVA) (H)     due to HTN?     Hyperlipidemia      Hypertension       Hypotension, unspecified hypotension type 8/17/2021     Hypothyroidism      Restless leg syndrome      Weight loss 8/17/2021     Past Surgical History:   Procedure Laterality Date     CV LEFT ATRIAL APPENDAGE CLOSURE N/A 4/13/2023    Procedure: Left Atrial Appendage Closure;  Surgeon: Shavon Hobson MD;  Location: [facility] LAB CV     LUMPECTOMY BREAST  01/20/2022     No family history on file. Social History     Socioeconomic History     Marital status: Single     Spouse name: Not on file     Number of children: Not on file     Years of education: Not on file     Highest education level: Not on file   Occupational History     Not on file   Tobacco Use     Smoking status: Former     Types: Cigarettes     Passive exposure: Never     Smokeless tobacco: Never   Substance and Sexual Activity     Alcohol use: Not on file     Drug use: Not on file     Sexual activity: Not on file   Other Topics Concern     Parent/sibling w/ CABG, MI or angioplasty before 65F 55M? Not Asked   Social History Narrative     Not on file     Social Drivers of Health     Financial Resource Strain: Not on file   Food Insecurity: Not on file   Transportation Needs: Not on file   Physical Activity: Not on file   Stress: Not on file   Social Connections: Unknown (11/16/2022)    Received [facility]    Social Connections      Frequency of Communication with Friends and Family: Not on file   Interpersonal Safety: Not on file   Housing Stability: Not on file          Medications  Allergies   Current Outpatient Medications   Medication Sig Dispense Refill     acetaminophen (TYLENOL) 500 MG tablet Take 500-1,000 mg by mouth every 6 hours as needed for pain       aspirin (ASA) 81 MG EC tablet Take 1 tablet (81 mg) by mouth daily Don't take this in addition to your excedrin migraine.       aspirin-acetaminophen-caffeine (EXCEDRIN MIGRAINE) 250-250-65 MG tablet Take 1 tablet by mouth every 4 hours as needed for  headaches       cephALEXin (KEFLEX) 250 MG capsule Take 250 mg by mouth.       divalproex sodium delayed-release (DEPAKOTE) 250 MG DR tablet Take 250 mg by mouth 2 times daily       DULoxetine HCl 40 MG CPEP Take 40 mg by mouth daily       exemestane (AROMASIN) 25 MG tablet Take 25 mg by mouth daily       guaiFENesin (ROBITUSSIN) 20 mg/mL liquid Take 20 mLs by mouth.       levothyroxine (SYNTHROID/LEVOTHROID) 137 MCG tablet Take 68.5 mcg by mouth daily       losartan (COZAAR) 25 MG tablet Take 1 tablet by mouth daily at 2 pm       memantine (NAMENDA) 5 MG tablet Take 2.5 mg by mouth 2 times daily       metFORMIN (GLUCOPHAGE) 500 MG tablet Take 500 mg by mouth.       nystatin (MYCOSTATIN) 947848 UNIT/GM external powder Apply 1 strip topically.       potassium chloride ER (KLOR-CON M) 20 MEQ CR tablet Take 20 mEq by mouth daily       ARIPiprazole (ABILIFY) 5 MG tablet Take 5 mg by mouth every morning       cholecalciferol, vitamin D3, 50 mcg (2,000 unit) capsule [CHOLECALCIFEROL, VITAMIN D3, 50 MCG (2,000 UNIT) CAPSULE] Take 1 capsule by mouth daily. (Patient not taking: Reported on 5/14/2025)       cloNIDine HCL (CATAPRES) 0.1 MG tablet Take 1 tablet by mouth 2 times daily Hold if BP <100/60 (Patient not taking: Reported on 5/14/2025)       estradiol (ESTRACE) 0.1 MG/GM vaginal cream Place vaginally three times a week (Patient not taking: Reported on 5/14/2025)       Melatonin 10 MG CAPS Take 10 mg by mouth nightly as needed for sleep (Patient not taking: Reported on 5/14/2025)       rosuvastatin (CRESTOR) 5 MG tablet Take 5 mg by mouth At Bedtime  (Patient not taking: Reported on 5/14/2025)       traZODone (DESYREL) 50 MG tablet Take 50 mg by mouth At Bedtime (Patient not taking: Reported on 5/14/2025)      Allergies   Allergen Reactions     Atenolol Unknown     Other reaction(s): Bradycardia, Other reaction(s): Bradycardia     Hydrochlorothiazide Unknown     Other reaction(s): Hypercalcemia, Other reaction(s):  "Hypercalcemia     Gabapentin Other (See Comments)     Other reaction(s): Confusion, Other reaction(s): Insomnia, Unknown, insomnia, insomnia     Lisinopril Nausea     Other reaction(s): GI Upset, Other reaction(s): Abdominal Pain     Nortriptyline Unknown     Oxycodone Itching     Paroxetine Unknown     Other reaction(s): Drowsiness, Sedation, Other reaction(s): Sedation     Tetracycline Photosensitivity and Rash     Amlodipine Unknown     Other reaction(s): GI Upset, Vomiting, Other reaction(s): Abdominal Pain     Codeine Itching     Tolerates tylenol 3 at home per pt     Oxycodone-Acetaminophen Itching     Sertraline Unknown     Other reaction(s): Sedation, Other reaction(s): Sedation     Diphenhydramine Anxiety     Other reaction(s): Agitation, Other reaction(s): Unknown         Lab Results    Chemistry/lipid CBC Cardiac Enzymes/BNP/TSH/INR   Recent Labs   Lab Test 11/11/24  1132   CHOL 106   HDL 44*   LDL 40   TRIG 111     Recent Labs   Lab Test 11/11/24  1132   LDL 40     Recent Labs   Lab Test 12/20/24  0528      POTASSIUM 5.3   CHLORIDE 105   CO2 21*   GLC 91   BUN 17.4   CR 0.85   GFRESTIMATED 67   DANTE 10.6*     Recent Labs   Lab Test 12/20/24  0528 07/09/24  0937 05/21/24  0739   CR 0.85 0.87 0.80     Recent Labs   Lab Test 11/11/24  1132 07/09/24  0937   A1C 6.0* 6.0*    Recent Labs   Lab Test 07/09/24  0937   WBC 6.7   HGB 14.2   HCT 44.7   MCV 89        Recent Labs   Lab Test 07/09/24  0937 04/13/23  1443 04/10/23  0935   HGB 14.2 14.8 15.9*    No results for input(s): \"TROPONINI\" in the last 47974 hours.  No results for input(s): \"BNP\", \"NTBNPI\", \"NTBNP\" in the last 73105 hours.  Recent Labs   Lab Test 07/09/24  0937   TSH 2.32     No results for input(s): \"INR\" in the last 67775 hours.     30 minutes spent on the date of encounter doing education, chart prep/review, review of test results, interpretation with above tests, patient visit, documentation, and discussion with family.      This " note has been dictated using voice recognition software. Any grammatical or context distortions are unintentional and inherent to the software.    Monique Constantino PA-C  Structural Heart Program  [facility]     Thank you for allowing me to participate in the care of your patient.      Sincerely,     Monique Constantino PA-C     [facility]  cc:   Shavon Hobson MD  [address] [place] [id number]

## 2025-05-14 NOTE — PATIENT INSTRUCTIONS
Jodee Johnson,    It was a pleasure to see you today in the clinic regarding your Watchman follow-up.     My recommendations after this visit include:     - continue aspirin 81 mg daily indefinitely   - if blood pressure is persistently elevated may need to consider increasing losartan    If you have questions or concerns, please call using the numbers below:    After Hours/Scheduling  [phone]    Otherwise you can dial the nurse directly at:                Kristy Nguyen RN  [phone]    Monique Constantino PA-C  Structural Heart Program  [facility]

## 2025-05-14 NOTE — PROGRESS NOTES
HEART CARE ENCOUNTER NOTE       [facility]  [phone]      Assessment/Recommendations   1.  Permanent atrial fibrillation/atrial flutter: Status post LAAO 4/13/2023 with a 24 mm Watchman FLX device.  Continue aspirin 81 mg daily indefinitely.    MODIFIED JENNIFER SCALE   Timepoint: 2yr Post-LAAC    Previous score: 3    Score Description   0 No symptoms at all   1 No significant disability despite symptoms; able to carry out all usual duties and activities   2 Slight disability; unable to carry out all previous activities, but able to look after own affairs without assistance   3 Moderate disability; requiring some help, but able to walk without assistance   4 Moderately severe disability; unable to walk without assistance and unable to attend to own bodily needs without assistance   5 Severe disability; bedridden, incontinent and requiring constant nursing care and attention   6 Dead    Total score (0 - 6):  3    Change in score if s/p LAAC? No     2.  Hypertension - elevated.  May need to consider increasing losartan if persistently elevated          History of Present Illness/Subjective    Jodee Johnson is a 84 year old female who comes in today for 2-year follow-up after watchman implant.  She is accompanied by her significant other for today's visit.    Jodee Johnson has a past history of permanent atrial fibrillation, atrial flutter, hypertension, hypothyroidism, history of cerebral hemorrhage, history of breast cancer    She is somewhat of a poor historian.  Her significant other Vernon is present today.  She is residing at a care facility.  She is currently taking aspirin 81 mg daily.  She denies new stroke or strokelike symptoms since watchman implant.  She denies chest discomfort, shortness of breath at last, leg swelling, orthopnea, PND.  She has a little bit of dizziness when going from sitting to standing.  Weight has been stable    Medical, surgical, family, social history, and  medications were reviewed and updated as necessary.    ECHO results (from 8/15/2023):  Interpretation Summary     The left ventricle is normal in size.  Left ventricular function is normal.The ejection fraction is 60-65%.  The left atrium is mildly dilated.  Watchman device noted in left atrial appendage. The device is well seated with  no leakage by color flow Doppler imaging.  Thrombus absent on left atrial appendage occlusion device.            Physical Examination Review of Systems   Vitals: BP (!) 154/100 (BP Location: Right arm, Patient Position: Sitting, Cuff Size: Adult Large)   Pulse 76   Resp 20   Wt 88 kg (194 lb)   SpO2 96%   BMI 35.48 kg/m    BMI= Body mass index is 35.48 kg/m .  Wt Readings from Last 3 Encounters:   05/14/25 88 kg (194 lb)   04/10/24 87.5 kg (193 lb)   08/08/23 76.7 kg (169 lb)       General Appearance:   Alert, cooperative and in no acute distress   ENT/Mouth: membranes moist, no oral lesions or bleeding gums.      EYES:  no scleral icterus, normal conjunctivae   Neck: Thyroid not visualized   Chest/Lungs:   lungs are clear to auscultation, no rales or wheezing   Cardiovascular:   Irregular. Normal first and second heart sounds with no murmurs, rubs or gallops; no edema bilaterally    Abdomen:  Soft and nontender   Extremities: no cyanosis or clubbing   Skin:    Neurologic:    Psychiatric: Normal mood and affect       Please refer above for cardiac ROS details.      Medical History  Surgical History Family History Social History   Past Medical History:   Diagnosis Date    Atrial fibrillation (H)     Cancer (H)     breast    Depression     Hemorrhagic cerebrovascular accident (CVA) (H)     due to HTN?    Hyperlipidemia     Hypertension     Hypotension, unspecified hypotension type 8/17/2021    Hypothyroidism     Restless leg syndrome     Weight loss 8/17/2021     Past Surgical History:   Procedure Laterality Date    CV LEFT ATRIAL APPENDAGE CLOSURE N/A 4/13/2023    Procedure: Left  Atrial Appendage Closure;  Surgeon: Shavon Hobson MD;  Location: [facility] CV    LUMPECTOMY BREAST  01/20/2022     No family history on file. Social History     Socioeconomic History    Marital status: Single     Spouse name: Not on file    Number of children: Not on file    Years of education: Not on file    Highest education level: Not on file   Occupational History    Not on file   Tobacco Use    Smoking status: Former     Types: Cigarettes     Passive exposure: Never    Smokeless tobacco: Never   Substance and Sexual Activity    Alcohol use: Not on file    Drug use: Not on file    Sexual activity: Not on file   Other Topics Concern    Parent/sibling w/ CABG, MI or angioplasty before 65F 55M? Not Asked   Social History Narrative    Not on file     Social Drivers of Health     Financial Resource Strain: Not on file   Food Insecurity: Not on file   Transportation Needs: Not on file   Physical Activity: Not on file   Stress: Not on file   Social Connections: Unknown (11/16/2022)    Received from [facility]    Social Connections     Frequency of Communication with Friends and Family: Not on file   Interpersonal Safety: Not on file   Housing Stability: Not on file          Medications  Allergies   Current Outpatient Medications   Medication Sig Dispense Refill    acetaminophen (TYLENOL) 500 MG tablet Take 500-1,000 mg by mouth every 6 hours as needed for pain      aspirin (ASA) 81 MG EC tablet Take 1 tablet (81 mg) by mouth daily Don't take this in addition to your excedrin migraine.      aspirin-acetaminophen-caffeine (EXCEDRIN MIGRAINE) 250-250-65 MG tablet Take 1 tablet by mouth every 4 hours as needed for headaches      cephALEXin (KEFLEX) 250 MG capsule Take 250 mg by mouth.      divalproex sodium delayed-release (DEPAKOTE) 250 MG DR tablet Take 250 mg by mouth 2 times daily      DULoxetine HCl 40 MG CPEP Take 40 mg by mouth daily      exemestane (AROMASIN) 25 MG  tablet Take 25 mg by mouth daily      guaiFENesin (ROBITUSSIN) 20 mg/mL liquid Take 20 mLs by mouth.      levothyroxine (SYNTHROID/LEVOTHROID) 137 MCG tablet Take 68.5 mcg by mouth daily      losartan (COZAAR) 25 MG tablet Take 1 tablet by mouth daily at 2 pm      memantine (NAMENDA) 5 MG tablet Take 2.5 mg by mouth 2 times daily      metFORMIN (GLUCOPHAGE) 500 MG tablet Take 500 mg by mouth.      nystatin (MYCOSTATIN) 861881 UNIT/GM external powder Apply 1 strip topically.      potassium chloride ER (KLOR-CON M) 20 MEQ CR tablet Take 20 mEq by mouth daily      ARIPiprazole (ABILIFY) 5 MG tablet Take 5 mg by mouth every morning      cholecalciferol, vitamin D3, 50 mcg (2,000 unit) capsule [CHOLECALCIFEROL, VITAMIN D3, 50 MCG (2,000 UNIT) CAPSULE] Take 1 capsule by mouth daily. (Patient not taking: Reported on 5/14/2025)      cloNIDine HCL (CATAPRES) 0.1 MG tablet Take 1 tablet by mouth 2 times daily Hold if BP <100/60 (Patient not taking: Reported on 5/14/2025)      estradiol (ESTRACE) 0.1 MG/GM vaginal cream Place vaginally three times a week (Patient not taking: Reported on 5/14/2025)      Melatonin 10 MG CAPS Take 10 mg by mouth nightly as needed for sleep (Patient not taking: Reported on 5/14/2025)      rosuvastatin (CRESTOR) 5 MG tablet Take 5 mg by mouth At Bedtime  (Patient not taking: Reported on 5/14/2025)      traZODone (DESYREL) 50 MG tablet Take 50 mg by mouth At Bedtime (Patient not taking: Reported on 5/14/2025)      Allergies   Allergen Reactions    Atenolol Unknown     Other reaction(s): Bradycardia, Other reaction(s): Bradycardia    Hydrochlorothiazide Unknown     Other reaction(s): Hypercalcemia, Other reaction(s): Hypercalcemia    Gabapentin Other (See Comments)     Other reaction(s): Confusion, Other reaction(s): Insomnia, Unknown, insomnia, insomnia    Lisinopril Nausea     Other reaction(s): GI Upset, Other reaction(s): Abdominal Pain    Nortriptyline Unknown    Oxycodone Itching    Paroxetine  "Unknown     Other reaction(s): Drowsiness, Sedation, Other reaction(s): Sedation    Tetracycline Photosensitivity and Rash    Amlodipine Unknown     Other reaction(s): GI Upset, Vomiting, Other reaction(s): Abdominal Pain    Codeine Itching     Tolerates tylenol 3 at home per pt    Oxycodone-Acetaminophen Itching    Sertraline Unknown     Other reaction(s): Sedation, Other reaction(s): Sedation    Diphenhydramine Anxiety     Other reaction(s): Agitation, Other reaction(s): Unknown         Lab Results    Chemistry/lipid CBC Cardiac Enzymes/BNP/TSH/INR   Recent Labs   Lab Test 11/11/24  1132   CHOL 106   HDL 44*   LDL 40   TRIG 111     Recent Labs   Lab Test 11/11/24  1132   LDL 40     Recent Labs   Lab Test 12/20/24  0528      POTASSIUM 5.3   CHLORIDE 105   CO2 21*   GLC 91   BUN 17.4   CR 0.85   GFRESTIMATED 67   DANTE 10.6*     Recent Labs   Lab Test 12/20/24  0528 07/09/24  0937 05/21/24  0739   CR 0.85 0.87 0.80     Recent Labs   Lab Test 11/11/24  1132 07/09/24  0937   A1C 6.0* 6.0*    Recent Labs   Lab Test 07/09/24  0937   WBC 6.7   HGB 14.2   HCT 44.7   MCV 89        Recent Labs   Lab Test 07/09/24  0937 04/13/23  1443 04/10/23  0935   HGB 14.2 14.8 15.9*    No results for input(s): \"TROPONINI\" in the last 00168 hours.  No results for input(s): \"BNP\", \"NTBNPI\", \"NTBNP\" in the last 48521 hours.  Recent Labs   Lab Test 07/09/24  0937   TSH 2.32     No results for input(s): \"INR\" in the last 88049 hours.     30 minutes spent on the date of encounter doing education, chart prep/review, review of test results, interpretation with above tests, patient visit, documentation, and discussion with family.      This note has been dictated using voice recognition software. Any grammatical or context distortions are unintentional and inherent to the software.    Monique Constantino PA-C  Structural Heart Program  [facility]   "

## 2025-07-09 ENCOUNTER — LAB REQUISITION (OUTPATIENT)
Dept: LAB | Facility: CLINIC | Age: 85
End: 2025-07-09
Payer: COMMERCIAL

## 2025-07-09 DIAGNOSIS — E55.9 VITAMIN D DEFICIENCY, UNSPECIFIED: ICD-10-CM

## 2025-07-09 DIAGNOSIS — E03.9 HYPOTHYROIDISM, UNSPECIFIED: ICD-10-CM

## 2025-07-09 DIAGNOSIS — E11.9 TYPE 2 DIABETES MELLITUS WITHOUT COMPLICATIONS (H): ICD-10-CM

## 2025-07-09 DIAGNOSIS — N18.9 CHRONIC KIDNEY DISEASE, UNSPECIFIED: ICD-10-CM

## 2025-07-11 LAB
ANION GAP SERPL CALCULATED.3IONS-SCNC: 12 MMOL/L (ref 7–15)
BUN SERPL-MCNC: 21 MG/DL (ref 8–23)
CALCIUM SERPL-MCNC: 10.5 MG/DL (ref 8.8–10.4)
CHLORIDE SERPL-SCNC: 108 MMOL/L (ref 98–107)
CREAT SERPL-MCNC: 0.89 MG/DL (ref 0.51–0.95)
EGFRCR SERPLBLD CKD-EPI 2021: 63 ML/MIN/1.73M2
ERYTHROCYTE [DISTWIDTH] IN BLOOD BY AUTOMATED COUNT: 14.6 % (ref 10–15)
EST. AVERAGE GLUCOSE BLD GHB EST-MCNC: 120 MG/DL
GLUCOSE SERPL-MCNC: 92 MG/DL (ref 70–99)
HBA1C MFR BLD: 5.8 %
HCO3 SERPL-SCNC: 23 MMOL/L (ref 22–29)
HCT VFR BLD AUTO: 44.9 % (ref 35–47)
HGB BLD-MCNC: 13.6 G/DL (ref 11.7–15.7)
MCH RBC QN AUTO: 28.4 PG (ref 26.5–33)
MCHC RBC AUTO-ENTMCNC: 30.3 G/DL (ref 31.5–36.5)
MCV RBC AUTO: 94 FL (ref 78–100)
PLATELET # BLD AUTO: 319 10E3/UL (ref 150–450)
POTASSIUM SERPL-SCNC: 4.1 MMOL/L (ref 3.4–5.3)
PTH-INTACT SERPL-MCNC: 40 PG/ML (ref 15–65)
RBC # BLD AUTO: 4.79 10E6/UL (ref 3.8–5.2)
SODIUM SERPL-SCNC: 143 MMOL/L (ref 135–145)
TSH SERPL DL<=0.005 MIU/L-ACNC: 1.24 UIU/ML (ref 0.3–4.2)
VIT D+METAB SERPL-MCNC: 53 NG/ML (ref 20–50)
WBC # BLD AUTO: 8.4 10E3/UL (ref 4–11)

## 2025-07-11 PROCEDURE — 85027 COMPLETE CBC AUTOMATED: CPT | Mod: ORL | Performed by: NURSE PRACTITIONER

## 2025-07-11 PROCEDURE — 80048 BASIC METABOLIC PNL TOTAL CA: CPT | Mod: ORL | Performed by: NURSE PRACTITIONER

## 2025-07-11 PROCEDURE — 83036 HEMOGLOBIN GLYCOSYLATED A1C: CPT | Mod: ORL | Performed by: NURSE PRACTITIONER

## 2025-07-11 PROCEDURE — 36415 COLL VENOUS BLD VENIPUNCTURE: CPT | Mod: ORL | Performed by: NURSE PRACTITIONER

## 2025-07-11 PROCEDURE — 83970 ASSAY OF PARATHORMONE: CPT | Mod: ORL | Performed by: NURSE PRACTITIONER

## 2025-07-11 PROCEDURE — P9604 ONE-WAY ALLOW PRORATED TRIP: HCPCS | Mod: ORL | Performed by: NURSE PRACTITIONER

## 2025-07-11 PROCEDURE — 84443 ASSAY THYROID STIM HORMONE: CPT | Mod: ORL | Performed by: NURSE PRACTITIONER

## 2025-07-11 PROCEDURE — 82306 VITAMIN D 25 HYDROXY: CPT | Mod: ORL | Performed by: NURSE PRACTITIONER

## (undated) DEVICE — INTRODUCER CHECK FLO 16FRX30CM .038

## (undated) DEVICE — MANIFOLD KIT ANGIO AUTOMATED 014613

## (undated) DEVICE — TRANSDUCER W/MONITORING TRAY 42632-05

## (undated) DEVICE — WIRE GUIDE AMPLATZ SUPER STIFF 0.035"X180CM 46-501

## (undated) DEVICE — INTRO MICRO MINI STICK 4FR X 10CM STIFF H965457511

## (undated) DEVICE — ELECTRODE ADULT PACING MULTI P-211-M1

## (undated) DEVICE — CATH ANGIO IMPULSE 6FR 110CML  PIGTAIL

## (undated) DEVICE — SHEATH WITH DILATOR ACCESS SYSTEM FXD CRV DBL M635TU80020

## (undated) DEVICE — SYR ANGIOGRAPHY MULTIUSE KIT ACIST 014612

## (undated) DEVICE — SHEATH GUIDING VERSACROSS D1 CURVE L85 CM L180 CBL VXAK0003

## (undated) DEVICE — KIT HAND CONTROL ACIST 014644 AR-P54

## (undated) DEVICE — CUSTOM PACK CORONARY SAN5BCRHEA

## (undated) RX ORDER — ASPIRIN 81 MG/1
TABLET, CHEWABLE ORAL
Status: DISPENSED
Start: 2023-04-13

## (undated) RX ORDER — PHENYLEPHRINE HYDROCHLORIDE 10 MG/ML
INJECTION INTRAVENOUS
Status: DISPENSED
Start: 2023-04-13

## (undated) RX ORDER — FENTANYL CITRATE-0.9 % NACL/PF 10 MCG/ML
PLASTIC BAG, INJECTION (ML) INTRAVENOUS
Status: DISPENSED
Start: 2023-04-13

## (undated) RX ORDER — ASPIRIN 81 MG/1
TABLET ORAL
Status: DISPENSED
Start: 2023-04-13

## (undated) RX ORDER — FENTANYL CITRATE 50 UG/ML
INJECTION, SOLUTION INTRAMUSCULAR; INTRAVENOUS
Status: DISPENSED
Start: 2023-04-13